# Patient Record
Sex: FEMALE | Race: WHITE | Employment: UNEMPLOYED | URBAN - METROPOLITAN AREA
[De-identification: names, ages, dates, MRNs, and addresses within clinical notes are randomized per-mention and may not be internally consistent; named-entity substitution may affect disease eponyms.]

---

## 2017-05-05 ENCOUNTER — APPOINTMENT (OUTPATIENT)
Dept: GENERAL RADIOLOGY | Age: 57
End: 2017-05-05
Payer: COMMERCIAL

## 2017-05-05 ENCOUNTER — HOSPITAL ENCOUNTER (EMERGENCY)
Age: 57
Discharge: HOME | End: 2017-05-05
Payer: COMMERCIAL

## 2017-05-05 VITALS
SYSTOLIC BLOOD PRESSURE: 134 MMHG | BODY MASS INDEX: 25.3 KG/M2 | WEIGHT: 134 LBS | TEMPERATURE: 97.9 F | HEART RATE: 75 BPM | HEIGHT: 61 IN | RESPIRATION RATE: 16 BRPM | DIASTOLIC BLOOD PRESSURE: 85 MMHG | OXYGEN SATURATION: 97 %

## 2017-05-05 DIAGNOSIS — S83.91XA SPRAIN OF RIGHT KNEE, UNSPECIFIED LIGAMENT, INITIAL ENCOUNTER: Primary | ICD-10-CM

## 2017-05-05 PROCEDURE — 73562 X-RAY EXAM OF KNEE 3: CPT

## 2017-05-05 PROCEDURE — 99283 EMERGENCY DEPT VISIT LOW MDM: CPT

## 2017-05-05 RX ORDER — NAPROXEN 500 MG/1
500 TABLET ORAL 2 TIMES DAILY
Qty: 20 TABLET | Refills: 0 | Status: SHIPPED | OUTPATIENT
Start: 2017-05-05 | End: 2018-05-17

## 2017-05-05 ASSESSMENT — ENCOUNTER SYMPTOMS
ABDOMINAL PAIN: 0
BACK PAIN: 0
COUGH: 0
SHORTNESS OF BREATH: 0

## 2017-05-05 ASSESSMENT — PAIN SCALES - GENERAL: PAINLEVEL_OUTOF10: 8

## 2017-05-05 ASSESSMENT — PAIN DESCRIPTION - ORIENTATION: ORIENTATION: RIGHT

## 2017-05-05 ASSESSMENT — PAIN DESCRIPTION - LOCATION: LOCATION: KNEE

## 2017-05-05 ASSESSMENT — PAIN DESCRIPTION - PAIN TYPE: TYPE: ACUTE PAIN

## 2017-11-21 ENCOUNTER — HOSPITAL ENCOUNTER (EMERGENCY)
Age: 57
Discharge: LEFT BEFORE DISCHARGE | End: 2017-11-21
Attending: EMERGENCY MEDICINE
Payer: COMMERCIAL

## 2017-11-21 ENCOUNTER — APPOINTMENT (OUTPATIENT)
Dept: CT IMAGING | Age: 57
End: 2017-11-21
Payer: COMMERCIAL

## 2017-11-21 VITALS
SYSTOLIC BLOOD PRESSURE: 154 MMHG | HEART RATE: 80 BPM | WEIGHT: 135 LBS | RESPIRATION RATE: 16 BRPM | TEMPERATURE: 98 F | HEIGHT: 61 IN | OXYGEN SATURATION: 99 % | BODY MASS INDEX: 25.49 KG/M2 | DIASTOLIC BLOOD PRESSURE: 97 MMHG

## 2017-11-21 DIAGNOSIS — R22.0 FACIAL SWELLING: Primary | ICD-10-CM

## 2017-11-21 LAB
ALBUMIN SERPL-MCNC: 4.5 G/DL (ref 3.9–4.9)
ALP BLD-CCNC: 72 U/L (ref 40–130)
ALT SERPL-CCNC: 17 U/L (ref 0–33)
ANION GAP SERPL CALCULATED.3IONS-SCNC: 13 MEQ/L (ref 7–13)
AST SERPL-CCNC: 19 U/L (ref 0–35)
BASOPHILS ABSOLUTE: 0 K/UL (ref 0–0.2)
BASOPHILS RELATIVE PERCENT: 0.4 %
BILIRUB SERPL-MCNC: 0.4 MG/DL (ref 0–1.2)
BUN BLDV-MCNC: 12 MG/DL (ref 6–20)
CALCIUM SERPL-MCNC: 8.9 MG/DL (ref 8.6–10.2)
CHLORIDE BLD-SCNC: 94 MEQ/L (ref 98–107)
CO2: 28 MEQ/L (ref 22–29)
CREAT SERPL-MCNC: 0.71 MG/DL (ref 0.5–0.9)
EOSINOPHILS ABSOLUTE: 0 K/UL (ref 0–0.7)
EOSINOPHILS RELATIVE PERCENT: 0.6 %
GFR AFRICAN AMERICAN: >60
GFR AFRICAN AMERICAN: >60
GFR NON-AFRICAN AMERICAN: >60
GFR NON-AFRICAN AMERICAN: >60
GLOBULIN: 3.5 G/DL (ref 2.3–3.5)
GLUCOSE BLD-MCNC: 101 MG/DL (ref 74–109)
HCT VFR BLD CALC: 41.3 % (ref 37–47)
HEMOGLOBIN: 13.9 G/DL (ref 12–16)
LYMPHOCYTES ABSOLUTE: 0.8 K/UL (ref 1–4.8)
LYMPHOCYTES RELATIVE PERCENT: 12.7 %
MCH RBC QN AUTO: 30.3 PG (ref 27–31.3)
MCHC RBC AUTO-ENTMCNC: 33.7 % (ref 33–37)
MCV RBC AUTO: 89.8 FL (ref 82–100)
MONOCYTES ABSOLUTE: 0.5 K/UL (ref 0.2–0.8)
MONOCYTES RELATIVE PERCENT: 7.8 %
NEUTROPHILS ABSOLUTE: 5.2 K/UL (ref 1.4–6.5)
NEUTROPHILS RELATIVE PERCENT: 78.5 %
PDW BLD-RTO: 15.7 % (ref 11.5–14.5)
PERFORMED ON: NORMAL
PLATELET # BLD: 83 K/UL (ref 130–400)
PLATELET SLIDE REVIEW: ABNORMAL
POC CREATININE: 0.8 MG/DL (ref 0.6–1.1)
POC SAMPLE TYPE: NORMAL
POTASSIUM SERPL-SCNC: 4.2 MEQ/L (ref 3.5–5.1)
RBC # BLD: 4.59 M/UL (ref 4.2–5.4)
SODIUM BLD-SCNC: 135 MEQ/L (ref 132–144)
TOTAL PROTEIN: 8 G/DL (ref 6.4–8.1)
WBC # BLD: 6.6 K/UL (ref 4.8–10.8)

## 2017-11-21 PROCEDURE — 85025 COMPLETE CBC W/AUTO DIFF WBC: CPT

## 2017-11-21 PROCEDURE — 36415 COLL VENOUS BLD VENIPUNCTURE: CPT

## 2017-11-21 PROCEDURE — 99283 EMERGENCY DEPT VISIT LOW MDM: CPT

## 2017-11-21 PROCEDURE — 80053 COMPREHEN METABOLIC PANEL: CPT

## 2017-11-21 RX ORDER — ONDANSETRON 2 MG/ML
4 INJECTION INTRAMUSCULAR; INTRAVENOUS ONCE
Status: DISCONTINUED | OUTPATIENT
Start: 2017-11-21 | End: 2017-11-21 | Stop reason: HOSPADM

## 2017-11-21 RX ORDER — 0.9 % SODIUM CHLORIDE 0.9 %
1000 INTRAVENOUS SOLUTION INTRAVENOUS ONCE
Status: DISCONTINUED | OUTPATIENT
Start: 2017-11-21 | End: 2017-11-21 | Stop reason: HOSPADM

## 2017-11-21 RX ORDER — KETOROLAC TROMETHAMINE 30 MG/ML
30 INJECTION, SOLUTION INTRAMUSCULAR; INTRAVENOUS ONCE
Status: DISCONTINUED | OUTPATIENT
Start: 2017-11-21 | End: 2017-11-21 | Stop reason: HOSPADM

## 2017-11-21 RX ORDER — LORAZEPAM 2 MG/ML
1 INJECTION INTRAMUSCULAR ONCE
Status: DISCONTINUED | OUTPATIENT
Start: 2017-11-21 | End: 2017-11-21 | Stop reason: HOSPADM

## 2017-11-21 RX ORDER — LEVOFLOXACIN 750 MG/1
750 TABLET ORAL DAILY
Qty: 10 TABLET | Refills: 0 | Status: SHIPPED | OUTPATIENT
Start: 2017-11-21 | End: 2017-12-01

## 2017-11-21 RX ORDER — SODIUM CHLORIDE 0.9 % (FLUSH) 0.9 %
10 SYRINGE (ML) INJECTION ONCE
Status: DISCONTINUED | OUTPATIENT
Start: 2017-11-21 | End: 2017-11-21 | Stop reason: HOSPADM

## 2017-11-21 ASSESSMENT — ENCOUNTER SYMPTOMS
VOMITING: 0
DIARRHEA: 0
BACK PAIN: 0
COUGH: 0
ABDOMINAL PAIN: 0
FACIAL SWELLING: 1
NAUSEA: 0
SHORTNESS OF BREATH: 0
SORE THROAT: 0

## 2017-11-21 ASSESSMENT — PAIN DESCRIPTION - FREQUENCY: FREQUENCY: CONTINUOUS

## 2017-11-21 ASSESSMENT — PAIN DESCRIPTION - ORIENTATION: ORIENTATION: RIGHT

## 2017-11-21 ASSESSMENT — PAIN SCALES - GENERAL: PAINLEVEL_OUTOF10: 7

## 2017-11-21 ASSESSMENT — PAIN DESCRIPTION - PAIN TYPE: TYPE: ACUTE PAIN

## 2017-11-21 ASSESSMENT — PAIN DESCRIPTION - LOCATION: LOCATION: FACE

## 2017-11-21 ASSESSMENT — PAIN DESCRIPTION - DESCRIPTORS: DESCRIPTORS: THROBBING;SHARP

## 2017-11-21 NOTE — ED PROVIDER NOTES
[facility] ED  eMERGENCY dEPARTMENT eNCOUnter      Pt Name: Sheldon Montero  MRN: [id number]  [place] 1960  Date of evaluation: 11/21/2017  Provider: Carla Fall MD    CHIEF COMPLAINT           HPI  Sheldon Montero is a 64 y.o. female per chart review has a h/o IBS, COPD, PCOS, BRANDEN, depression/anxiety, HIV with unknown CD4 count presents to the ED with facial swelling. Pt notes she woke up this morning with moderate, constant, aching R maxillary facial pain with swelling. No fever, neck pain, n/v, cp, sob. ROS  Review of Systems   Constitutional: Negative for activity change, chills and fever. HENT: Positive for facial swelling. Negative for ear pain and sore throat. Eyes: Negative for visual disturbance. Respiratory: Negative for cough and shortness of breath. Cardiovascular: Negative for chest pain, palpitations and leg swelling. Gastrointestinal: Negative for abdominal pain, diarrhea, nausea and vomiting. Genitourinary: Negative for dysuria. Musculoskeletal: Negative for back pain. Skin: Negative for rash. Neurological: Negative for dizziness and weakness. Except as noted above the remainder of the review of systems was reviewed and negative.        PAST MEDICAL HISTORY     Past Medical History:   Diagnosis Date    Anemia     Ankle fracture, right     in the past    Anxiety     Benign breast disease     right    Bipolar disorder ([address]     Carpal tunnel syndrome     Cervicalgia     and cervical disc herniation (C4-C5 level) leading to right arm pain    Chronic cough     COPD (chronic obstructive pulmonary disease) (HCC)     at small airway level (PFT's with negative methacholine challenge test:  01/2009)    Elevated BP     Fracture of left clavicle     as a child    HIV (human immunodeficiency virus infection) ([address] 09/2017    Hyperlipidemia     Irritable bowel syndrome     Menorrhagia     Migraine     Panic attacks     PCOS (polycystic ovarian education: N/A     Social History Main Topics    Smoking status: Never Smoker    Smokeless tobacco: Never Used    Alcohol use 0.0 oz/week      Comment: occasional    Drug use: No    Sexual activity: Not Currently     Other Topics Concern    None     Social History Narrative    None         PHYSICAL EXAM       ED Triage Vitals [11/21/17 1321]   BP Temp Temp Source Pulse Resp SpO2 Height Weight   (!) 154/97 98 °F (36.7 °C) Oral 80 16 99 % 5' 1\" (1.549 m) 135 lb (61.2 kg)       Physical Exam   Constitutional: She is oriented to person, place, and time. She appears well-developed. HENT:   Head: Normocephalic. Right Ear: External ear normal.   Left Ear: External ear normal.   Mouth/Throat: Oropharynx is clear and moist.   +Swelling, mild erythema noted to R maxillary area. +Tenderness to palpation. No fluctuance. Eyes: Conjunctivae are normal. Pupils are equal, round, and reactive to light. Neck: Normal range of motion. Neck supple. Cardiovascular: Normal rate, regular rhythm and normal heart sounds. Pulmonary/Chest: Effort normal and breath sounds normal.   Abdominal: Soft. Bowel sounds are normal. She exhibits no distension. There is no tenderness. Musculoskeletal: Normal range of motion. Neurological: She is alert and oriented to person, place, and time. Skin: Skin is warm and dry. Psychiatric: She has a normal mood and affect. Nursing note and vitals reviewed. MDM  65 yo female presents to the ED with R facial swelling. Pt is afebrile, hemodynamically stable. Pt given 1 L NS, IV toradol with moderate relief. Labs unremarkable. Plan was to do CT face to r/o abscess and eval infx however pt decided she did not want to wait in the ED anymore. I explained to her the risks/benefits of a CT scan and staying in the ED however pt still wanted to go home. Pt given prescription for levaquin and advised to return if swelling/pain got worse. Pt understands plan.            FINAL

## 2018-01-11 DIAGNOSIS — Z12.4 CERVICAL CANCER SCREENING: ICD-10-CM

## 2018-01-11 DIAGNOSIS — R85.81 ANAL HIGH RISK HUMAN PAPILLOMAVIRUS (HPV) DNA TEST POSITIVE: Primary | ICD-10-CM

## 2018-01-11 DIAGNOSIS — K58.0 IRRITABLE BOWEL SYNDROME WITH DIARRHEA: ICD-10-CM

## 2018-01-11 DIAGNOSIS — B20 HIV (HUMAN IMMUNODEFICIENCY VIRUS INFECTION) (HCC): ICD-10-CM

## 2018-01-11 DIAGNOSIS — R87.810 CERVICAL HIGH RISK HPV (HUMAN PAPILLOMAVIRUS) TEST POSITIVE: ICD-10-CM

## 2018-01-30 ENCOUNTER — OFFICE VISIT (OUTPATIENT)
Dept: OBGYN CLINIC | Age: 58
End: 2018-01-30
Payer: COMMERCIAL

## 2018-01-30 VITALS
HEART RATE: 74 BPM | SYSTOLIC BLOOD PRESSURE: 119 MMHG | DIASTOLIC BLOOD PRESSURE: 79 MMHG | WEIGHT: 139 LBS | BODY MASS INDEX: 26.26 KG/M2

## 2018-01-30 DIAGNOSIS — Z00.00 ENCOUNTER FOR ANNUAL PHYSICAL EXAM: Primary | ICD-10-CM

## 2018-01-30 PROCEDURE — G8427 DOCREV CUR MEDS BY ELIG CLIN: HCPCS | Performed by: OBSTETRICS & GYNECOLOGY

## 2018-01-30 PROCEDURE — 99213 OFFICE O/P EST LOW 20 MIN: CPT | Performed by: OBSTETRICS & GYNECOLOGY

## 2018-01-30 PROCEDURE — 1036F TOBACCO NON-USER: CPT | Performed by: OBSTETRICS & GYNECOLOGY

## 2018-01-30 PROCEDURE — 3014F SCREEN MAMMO DOC REV: CPT | Performed by: OBSTETRICS & GYNECOLOGY

## 2018-01-30 PROCEDURE — G8419 CALC BMI OUT NRM PARAM NOF/U: HCPCS | Performed by: OBSTETRICS & GYNECOLOGY

## 2018-01-30 PROCEDURE — G8482 FLU IMMUNIZE ORDER/ADMIN: HCPCS | Performed by: OBSTETRICS & GYNECOLOGY

## 2018-01-30 PROCEDURE — 3017F COLORECTAL CA SCREEN DOC REV: CPT | Performed by: OBSTETRICS & GYNECOLOGY

## 2018-02-01 PROBLEM — Z00.00 ENCOUNTER FOR ANNUAL PHYSICAL EXAM: Status: ACTIVE | Noted: 2018-02-01

## 2018-02-01 RX ORDER — NEOMYCIN SULFATE, POLYMYXIN B SULFATE, AND DEXAMETHASONE 3.5; 10000; 1 MG/G; [USP'U]/G; MG/G
OINTMENT OPHTHALMIC
COMMUNITY
Start: 2018-01-09 | End: 2018-06-15 | Stop reason: ALTCHOICE

## 2018-02-01 RX ORDER — OMEPRAZOLE 20 MG/1
20 CAPSULE, DELAYED RELEASE ORAL
COMMUNITY
Start: 2017-10-09 | End: 2018-02-08 | Stop reason: ALTCHOICE

## 2018-02-01 RX ORDER — ROSUVASTATIN CALCIUM 5 MG/1
5 TABLET, COATED ORAL EVERY OTHER DAY
COMMUNITY
Start: 2017-12-19

## 2018-02-08 ENCOUNTER — OFFICE VISIT (OUTPATIENT)
Dept: GASTROENTEROLOGY | Age: 58
End: 2018-02-08
Payer: COMMERCIAL

## 2018-02-08 VITALS
OXYGEN SATURATION: 99 % | RESPIRATION RATE: 18 BRPM | HEART RATE: 76 BPM | DIASTOLIC BLOOD PRESSURE: 78 MMHG | HEIGHT: 61 IN | SYSTOLIC BLOOD PRESSURE: 120 MMHG | WEIGHT: 140 LBS | BODY MASS INDEX: 26.43 KG/M2

## 2018-02-08 DIAGNOSIS — B20 HIV (HUMAN IMMUNODEFICIENCY VIRUS INFECTION) (HCC): Primary | ICD-10-CM

## 2018-02-08 DIAGNOSIS — R19.7 DIARRHEA, UNSPECIFIED TYPE: ICD-10-CM

## 2018-02-08 PROCEDURE — 1036F TOBACCO NON-USER: CPT | Performed by: INTERNAL MEDICINE

## 2018-02-08 PROCEDURE — G8428 CUR MEDS NOT DOCUMENT: HCPCS | Performed by: INTERNAL MEDICINE

## 2018-02-08 PROCEDURE — G8482 FLU IMMUNIZE ORDER/ADMIN: HCPCS | Performed by: INTERNAL MEDICINE

## 2018-02-08 PROCEDURE — 99203 OFFICE O/P NEW LOW 30 MIN: CPT | Performed by: INTERNAL MEDICINE

## 2018-02-08 PROCEDURE — G8419 CALC BMI OUT NRM PARAM NOF/U: HCPCS | Performed by: INTERNAL MEDICINE

## 2018-02-08 PROCEDURE — 3017F COLORECTAL CA SCREEN DOC REV: CPT | Performed by: INTERNAL MEDICINE

## 2018-02-08 PROCEDURE — 3014F SCREEN MAMMO DOC REV: CPT | Performed by: INTERNAL MEDICINE

## 2018-02-08 RX ORDER — POLYETHYLENE GLYCOL 3350, SODIUM CHLORIDE, SODIUM BICARBONATE, POTASSIUM CHLORIDE 420; 11.2; 5.72; 1.48 G/4L; G/4L; G/4L; G/4L
4000 POWDER, FOR SOLUTION ORAL ONCE
Qty: 1 BOTTLE | Refills: 0 | Status: SHIPPED | OUTPATIENT
Start: 2018-02-08 | End: 2018-02-08

## 2018-02-08 NOTE — PROGRESS NOTES
Gastroenterology Clinic Visit    Randa Jasso  [id number]  Chief Complaint   Patient presents with    Diarrhea     HPI: 62 y.o. female presents to the clinic with had concerns including Diarrhea. Patient presents to the GI clinic with further evaluation for positive HPV. Patient with recent diagnosis of HIV 8 months ago at [facility]. Patient unclear as to her visit to the clinic today. She was under the impression she was seeing infectious disease for further evaluation of the HPV. From the GI standpoint of view she does report to chronic diarrhea which appears to have worsened after she was started on medication for the HIV. Detailed note from [place] clinic visits reviewed and it appears that the source of her infection has not been identified despite careful combing off the potential sources. Patient reports intermittent blood on toilet wipes consistent with her reported history of internal and external hemorrhoids. Patient denies any recent weight loss, in fact reports she has gained a pound or 2 in the last 2 weeks  Psych history as noted below    Review of Systems   All other systems reviewed and are negative.      Past Medical History:   Diagnosis Date    Anemia     Ankle fracture, right     in the past    Anxiety     Benign breast disease     right    Bipolar disorder (HCC)     Carpal tunnel syndrome     Cervicalgia     and cervical disc herniation (C4-C5 level) leading to right arm pain    Chronic cough     COPD (chronic obstructive pulmonary disease) (HCC)     at small airway level (PFT's with negative methacholine challenge test:  01/2009)    Elevated BP     Fracture of left clavicle     as a child    HIV (human immunodeficiency virus infection) ([facility] 09/2017    Hyperlipidemia     Irritable bowel syndrome     Menorrhagia     Migraine     Panic attacks     PCOS (polycystic ovarian syndrome)     Right shoulder pain       Past Surgical History:   Procedure Laterality Date

## 2018-02-12 ENCOUNTER — TELEPHONE (OUTPATIENT)
Dept: GASTROENTEROLOGY | Age: 58
End: 2018-02-12

## 2018-02-12 LAB — PAP SMEAR: ABNORMAL

## 2018-02-26 DIAGNOSIS — B20 HIV (HUMAN IMMUNODEFICIENCY VIRUS INFECTION) (HCC): ICD-10-CM

## 2018-02-26 DIAGNOSIS — Z13.220 LIPID SCREENING: ICD-10-CM

## 2018-02-26 DIAGNOSIS — E55.9 VITAMIN D DEFICIENCY: Primary | ICD-10-CM

## 2018-02-26 PROBLEM — Z21 HIV (HUMAN IMMUNODEFICIENCY VIRUS INFECTION) (HCC): Status: ACTIVE | Noted: 2018-02-26

## 2018-02-28 ENCOUNTER — NURSE ONLY (OUTPATIENT)
Dept: INFECTIOUS DISEASES | Age: 58
End: 2018-02-28

## 2018-02-28 DIAGNOSIS — B20 HIV (HUMAN IMMUNODEFICIENCY VIRUS INFECTION) (HCC): ICD-10-CM

## 2018-02-28 DIAGNOSIS — Z01.89 ROUTINE LAB DRAW: Primary | ICD-10-CM

## 2018-02-28 DIAGNOSIS — E55.9 VITAMIN D DEFICIENCY: ICD-10-CM

## 2018-02-28 LAB
ALBUMIN SERPL-MCNC: 4.5 G/DL (ref 3.9–4.9)
ALP BLD-CCNC: 71 U/L (ref 40–130)
ALT SERPL-CCNC: 14 U/L (ref 0–33)
ANION GAP SERPL CALCULATED.3IONS-SCNC: 20 MEQ/L (ref 7–13)
AST SERPL-CCNC: 18 U/L (ref 0–35)
BILIRUB SERPL-MCNC: 0.3 MG/DL (ref 0–1.2)
BUN BLDV-MCNC: 18 MG/DL (ref 6–20)
CALCIUM SERPL-MCNC: 9 MG/DL (ref 8.6–10.2)
CHLORIDE BLD-SCNC: 99 MEQ/L (ref 98–107)
CO2: 25 MEQ/L (ref 22–29)
CREAT SERPL-MCNC: 0.89 MG/DL (ref 0.5–0.9)
GFR AFRICAN AMERICAN: >60
GFR NON-AFRICAN AMERICAN: >60
GLOBULIN: 2.7 G/DL (ref 2.3–3.5)
GLUCOSE BLD-MCNC: 59 MG/DL (ref 74–109)
HCT VFR BLD CALC: 42.5 % (ref 37–47)
HEMOGLOBIN: 13.7 G/DL (ref 12–16)
HEPATITIS C ANTIBODY INTERPRETATION: NORMAL
MCH RBC QN AUTO: 31.8 PG (ref 27–31.3)
MCHC RBC AUTO-ENTMCNC: 32.3 % (ref 33–37)
MCV RBC AUTO: 98.4 FL (ref 82–100)
PDW BLD-RTO: 15 % (ref 11.5–14.5)
PLATELET # BLD: 124 K/UL (ref 130–400)
POTASSIUM SERPL-SCNC: 3.9 MEQ/L (ref 3.5–5.1)
RBC # BLD: 4.32 M/UL (ref 4.2–5.4)
SODIUM BLD-SCNC: 144 MEQ/L (ref 132–144)
TOTAL PROTEIN: 7.2 G/DL (ref 6.4–8.1)
VITAMIN D 25-HYDROXY: 20.2 NG/ML (ref 30–100)
WBC # BLD: 4 K/UL (ref 4.8–10.8)

## 2018-03-01 LAB — RPR: NORMAL

## 2018-03-02 LAB
ABSOLUTE CD 4 HELPER: 549 CELLS/UL (ref 430–1800)
CD4 % HELPER T CELL: 51 % (ref 32–64)
LYMPHOCYTE SUBSET PANEL 2 INFO: NORMAL

## 2018-03-03 LAB
HIV RNA PCR INTERPRETATION: DETECTED
HIV-1 RNA BY PCR, QN: ABNORMAL CPY/ML
HIV-1 RNA BY PCR, QN: ABNORMAL LOG

## 2018-03-04 LAB
QUANTIFERON (R) TB GOLD (INCUBATED): NEGATIVE
QUANTIFERON MITOGEN: >10 IU/ML
QUANTIFERON NIL: 0.17 IU/ML
QUANTIFERON TB AG MINUS NIL: 0 IU/ML (ref 0–0.34)
RPR TITER: NORMAL

## 2018-03-07 LAB — C. TRACHOMATIS DNA ,URINE: NEGATIVE

## 2018-03-09 ENCOUNTER — OFFICE VISIT (OUTPATIENT)
Dept: INFECTIOUS DISEASES | Age: 58
End: 2018-03-09
Payer: COMMERCIAL

## 2018-03-09 VITALS
RESPIRATION RATE: 14 BRPM | DIASTOLIC BLOOD PRESSURE: 79 MMHG | TEMPERATURE: 97.7 F | HEIGHT: 61 IN | WEIGHT: 139.8 LBS | HEART RATE: 65 BPM | SYSTOLIC BLOOD PRESSURE: 129 MMHG | BODY MASS INDEX: 26.39 KG/M2

## 2018-03-09 DIAGNOSIS — R19.7 DIARRHEA, UNSPECIFIED TYPE: ICD-10-CM

## 2018-03-09 DIAGNOSIS — A59.9 TRICHOMONIASIS: ICD-10-CM

## 2018-03-09 DIAGNOSIS — B20 HIV (HUMAN IMMUNODEFICIENCY VIRUS INFECTION) (HCC): Primary | ICD-10-CM

## 2018-03-09 DIAGNOSIS — E55.9 VITAMIN D DEFICIENCY: ICD-10-CM

## 2018-03-09 DIAGNOSIS — N76.0 BACTERIAL VAGINOSIS: ICD-10-CM

## 2018-03-09 DIAGNOSIS — B96.89 BACTERIAL VAGINOSIS: ICD-10-CM

## 2018-03-09 DIAGNOSIS — Z86.19 HISTORY OF HPV INFECTION: ICD-10-CM

## 2018-03-09 PROCEDURE — G8427 DOCREV CUR MEDS BY ELIG CLIN: HCPCS | Performed by: INTERNAL MEDICINE

## 2018-03-09 PROCEDURE — G8482 FLU IMMUNIZE ORDER/ADMIN: HCPCS | Performed by: INTERNAL MEDICINE

## 2018-03-09 PROCEDURE — 99205 OFFICE O/P NEW HI 60 MIN: CPT | Performed by: INTERNAL MEDICINE

## 2018-03-09 PROCEDURE — G8419 CALC BMI OUT NRM PARAM NOF/U: HCPCS | Performed by: INTERNAL MEDICINE

## 2018-03-09 PROCEDURE — 3017F COLORECTAL CA SCREEN DOC REV: CPT | Performed by: INTERNAL MEDICINE

## 2018-03-09 PROCEDURE — 3014F SCREEN MAMMO DOC REV: CPT | Performed by: INTERNAL MEDICINE

## 2018-03-09 PROCEDURE — 1036F TOBACCO NON-USER: CPT | Performed by: INTERNAL MEDICINE

## 2018-03-09 NOTE — PROGRESS NOTES
Initial HIV Consult      Patient Name: Criss Stubbs  YOB: 1960  Patient Sex: female  Medical Record Number: [id number]        Background:  Diagnosed about 6 months ago. Symptoms started with UTI. WBCs have been low for years - possibility older infection  Lives with  - hiv negative. Has current other sexual partners - per patient all negative. No THC and no soking. Periodic alcohol  No travel out of [place]. Cat at home. No TB history    Brought a pad with brown discharge and states there is a smell. Has had trichomonas in the past. Possible BV. +anal warts history  Very anxious    Some loose stools    Review of Systems:  All 14 systems reviewed with patient and are negative other than as stated above. Medication History  Current Outpatient Prescriptions   Medication Sig Dispense Refill    Methylcobalamin (B12-ACTIVE PO) Take by mouth      rosuvastatin (CRESTOR) 5 MG tablet       neomycin-polymyxin-dexameth 3.5-04874-3.1 OINT       Jtlqtgs-Tnvwa-Tqlngsoc-TenofAF (GENVOYA) [phone] MG TABS Take 1 tablet by mouth daily      acetaminophen (TYLENOL) 500 MG tablet Take 500 mg by mouth      Naproxen Sodium (ALEVE) 220 MG CAPS Take  by mouth.  clonazepam (KLONOPIN) 0.5 MG tablet Take 0.5 mg by mouth 2 times daily as needed.  Multiple Vitamin (MULTIVITAMIN PO) Take 1 tablet by mouth daily.  naproxen (NAPROSYN) 500 MG tablet Take 1 tablet by mouth 2 times daily for 20 doses 20 tablet 0    cyanocobalamin 1000 MCG tablet Take 1,000 mcg by mouth       No current facility-administered medications for this visit.         Past Medical History  Past Medical History:   Diagnosis Date    Anemia     Ankle fracture, right     in the past    Anxiety     Benign breast disease     right    Bipolar disorder (HCC)     Carpal tunnel syndrome     Cervicalgia     and cervical disc herniation (C4-C5 level) leading to right arm pain    Chronic cough     COPD (chronic clear   Abdomen: soft, ND, NTTP, +BS  Extrem:+pulses, no calf pain, no asymmetry of the upper or lower extremities  Neuro exam: CN II-XII intact, facial expressions symmertrical bilaterally, no slurred speech, muscle strength equal bilaterally        Current labs        Chemistry        Component Value Date/Time     02/28/2018 1403    K 3.9 02/28/2018 1403    CL 99 02/28/2018 1403    CO2 25 02/28/2018 1403    BUN 18 02/28/2018 1403    CREATININE 0.89 02/28/2018 1403        Component Value Date/Time    CALCIUM 9.0 02/28/2018 1403    ALKPHOS 71 02/28/2018 1403    AST 18 02/28/2018 1403    ALT 14 02/28/2018 1403    BILITOT 0.3 02/28/2018 1403          No components found for: CHLPL  Lab Results   Component Value Date    TRIG 100 01/09/2013    TRIG 159 (H) 09/11/2012    TRIG 228 (H) 06/11/2012     Lab Results   Component Value Date    HDL 47 01/09/2013    HDL 47 09/11/2012    HDL 43 06/11/2012     Lab Results   Component Value Date    LDLCALC 105 01/09/2013    [address] 09/11/2012    LDLCALC 103 06/11/2012     No results found for: LABVLDL  No results found for: HAV, HEPAIGM, HEPBIGM, HEPBCAB, HBEAG, HEPCAB  No results found for: RPR    Impression/Plan  HIV infection  Bacterial vaginosis/ trichomonas - flagyl course. Anxiety  Diarrhea  Hx HPV  Vitamin D def - will start course    Labs prior to next visit  Needs to see gyn d/t hx of anal warts for pap - HPV hx    Prevention  Substance abuse screening performed:   Yes  Counseling provided during this visit > 50% yes   Brief Mental Health Screening performed:  Yes  Physical abuse screening performed: yes    Patient has been informed of the importance of protection during sexual encounters which include, but are not limited to vaginal intercourse, anal intercourse, and oral sex. Patient has also been made aware that it is a felony in the [address] to engage in a sexual encounter without first disclosing HIV status to partner.     Follow up: 3-6 months    Time

## 2018-03-12 ENCOUNTER — TELEPHONE (OUTPATIENT)
Dept: OBGYN CLINIC | Age: 58
End: 2018-03-12

## 2018-03-12 NOTE — TELEPHONE ENCOUNTER
Pt is scheduled for a colpo and is extremely concerned about the pain, she wants to know if you will give her anything for the pain?

## 2018-03-13 DIAGNOSIS — A63.0 ANAL MUCOSAL WART DUE TO HUMAN PAPILLOMAVIRUS (HPV): Primary | ICD-10-CM

## 2018-03-13 NOTE — TELEPHONE ENCOUNTER
COLPOSCOPY needs no pain meds. She can take OTC ibuprofen or acetaminophen prior to coming in if necessary.  YADIRA

## 2018-03-15 DIAGNOSIS — B20 HIV (HUMAN IMMUNODEFICIENCY VIRUS INFECTION) (HCC): ICD-10-CM

## 2018-03-15 DIAGNOSIS — R19.7 DIARRHEA, UNSPECIFIED TYPE: ICD-10-CM

## 2018-03-15 LAB — GI BACTERIAL PATHOGENS BY PCR: NORMAL

## 2018-03-17 LAB
OVA AND PARASITE TRICHROME: NEGATIVE
OVA AND PARASITE WET MOUNT: NEGATIVE

## 2018-03-20 ENCOUNTER — PROCEDURE VISIT (OUTPATIENT)
Dept: OBGYN CLINIC | Age: 58
End: 2018-03-20
Payer: COMMERCIAL

## 2018-03-20 VITALS
SYSTOLIC BLOOD PRESSURE: 112 MMHG | HEART RATE: 72 BPM | WEIGHT: 141 LBS | HEIGHT: 61 IN | DIASTOLIC BLOOD PRESSURE: 76 MMHG | BODY MASS INDEX: 26.62 KG/M2

## 2018-03-20 DIAGNOSIS — R87.610 ASCUS WITH POSITIVE HIGH RISK HPV CERVICAL: Primary | ICD-10-CM

## 2018-03-20 DIAGNOSIS — R87.810 ASCUS WITH POSITIVE HIGH RISK HPV CERVICAL: Primary | ICD-10-CM

## 2018-03-20 LAB
CONTROL: YES
PREGNANCY TEST URINE, POC: NORMAL

## 2018-03-20 PROCEDURE — 99999 PR OFFICE/OUTPT VISIT,PROCEDURE ONLY: CPT | Performed by: OBSTETRICS & GYNECOLOGY

## 2018-03-20 PROCEDURE — 57454 BX/CURETT OF CERVIX W/SCOPE: CPT | Performed by: OBSTETRICS & GYNECOLOGY

## 2018-03-20 NOTE — PROGRESS NOTES
Colposcopy Procedure Note    Indications: Pap smear : ASCUS with POSITIVE high risk HPV. Procedure Details   The risks and benefits of the procedure and Verbal informed consent obtained. Speculum placed in vagina and excellent visualization of cervix achieved, cervix swabbed x 3 with acetic acid solution. Findings:  Cervix: 4 biopsies 3, 12, 9, 6 o'clock with ECC     Specimens: as above    Complications: none. Plan:  Specimens labelled and sent to Pathology. Follow up:  Return in about 2 weeks (around 4/3/2018) for F/U for results.       Ulysses Carina, MD

## 2018-03-21 PROBLEM — A63.0 ANAL WARTS: Status: ACTIVE | Noted: 2018-03-19

## 2018-04-09 ENCOUNTER — OFFICE VISIT (OUTPATIENT)
Dept: OBGYN CLINIC | Age: 58
End: 2018-04-09
Payer: COMMERCIAL

## 2018-04-09 VITALS — DIASTOLIC BLOOD PRESSURE: 84 MMHG | HEART RATE: 76 BPM | HEIGHT: 61 IN | SYSTOLIC BLOOD PRESSURE: 118 MMHG

## 2018-04-09 DIAGNOSIS — R87.810 ASCUS WITH POSITIVE HIGH RISK HPV CERVICAL: ICD-10-CM

## 2018-04-09 DIAGNOSIS — N87.0 DYSPLASIA OF CERVIX, LOW GRADE (CIN 1): Primary | ICD-10-CM

## 2018-04-09 DIAGNOSIS — R87.610 ASCUS WITH POSITIVE HIGH RISK HPV CERVICAL: ICD-10-CM

## 2018-04-09 PROCEDURE — 99213 OFFICE O/P EST LOW 20 MIN: CPT | Performed by: OBSTETRICS & GYNECOLOGY

## 2018-04-09 PROCEDURE — G8419 CALC BMI OUT NRM PARAM NOF/U: HCPCS | Performed by: OBSTETRICS & GYNECOLOGY

## 2018-04-09 PROCEDURE — G8427 DOCREV CUR MEDS BY ELIG CLIN: HCPCS | Performed by: OBSTETRICS & GYNECOLOGY

## 2018-04-09 PROCEDURE — 1036F TOBACCO NON-USER: CPT | Performed by: OBSTETRICS & GYNECOLOGY

## 2018-04-09 PROCEDURE — 3017F COLORECTAL CA SCREEN DOC REV: CPT | Performed by: OBSTETRICS & GYNECOLOGY

## 2018-04-09 PROCEDURE — 3014F SCREEN MAMMO DOC REV: CPT | Performed by: OBSTETRICS & GYNECOLOGY

## 2018-04-09 ASSESSMENT — ENCOUNTER SYMPTOMS
SHORTNESS OF BREATH: 0
COUGH: 0
VOMITING: 0
NAUSEA: 0

## 2018-04-11 PROBLEM — Z00.00 ENCOUNTER FOR ANNUAL PHYSICAL EXAM: Status: RESOLVED | Noted: 2018-02-01 | Resolved: 2018-04-11

## 2018-05-17 ENCOUNTER — APPOINTMENT (OUTPATIENT)
Dept: GENERAL RADIOLOGY | Age: 58
End: 2018-05-17
Payer: COMMERCIAL

## 2018-05-17 ENCOUNTER — HOSPITAL ENCOUNTER (EMERGENCY)
Age: 58
Discharge: HOME | End: 2018-05-17
Payer: COMMERCIAL

## 2018-05-17 VITALS
HEART RATE: 87 BPM | DIASTOLIC BLOOD PRESSURE: 88 MMHG | WEIGHT: 141 LBS | TEMPERATURE: 98.3 F | BODY MASS INDEX: 26.62 KG/M2 | OXYGEN SATURATION: 98 % | SYSTOLIC BLOOD PRESSURE: 158 MMHG | HEIGHT: 61 IN | RESPIRATION RATE: 18 BRPM

## 2018-05-17 DIAGNOSIS — S20.212A CONTUSION OF LEFT CHEST WALL, INITIAL ENCOUNTER: Primary | ICD-10-CM
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
## 2018-05-17 LAB
EKG ATRIAL RATE: 90 BPM
EKG P AXIS: 62 DEGREES
EKG P-R INTERVAL: 150 MS
EKG Q-T INTERVAL: 392 MS
EKG QRS DURATION: 84 MS
EKG QTC CALCULATION (BAZETT): 479 MS
EKG R AXIS: 21 DEGREES
EKG T AXIS: -9 DEGREES
EKG VENTRICULAR RATE: 90 BPM

## 2018-05-17 PROCEDURE — 6370000000 HC RX 637 (ALT 250 FOR IP): Performed by: PHYSICIAN ASSISTANT

## 2018-05-17 PROCEDURE — 93005 ELECTROCARDIOGRAM TRACING: CPT

## 2018-05-17 PROCEDURE — 72050 X-RAY EXAM NECK SPINE 4/5VWS: CPT

## 2018-05-17 PROCEDURE — 99285 EMERGENCY DEPT VISIT HI MDM: CPT

## 2018-05-17 PROCEDURE — 71046 X-RAY EXAM CHEST 2 VIEWS: CPT

## 2018-05-17 RX ORDER — NAPROXEN 500 MG/1
500 TABLET ORAL 2 TIMES DAILY
Qty: 20 TABLET | Refills: 0 | Status: SHIPPED | OUTPATIENT
Start: 2018-05-17 | End: 2018-12-14 | Stop reason: ALTCHOICE

## 2018-05-17 RX ORDER — IBUPROFEN 800 MG/1
800 TABLET ORAL ONCE
Status: COMPLETED | OUTPATIENT
Start: 2018-05-17 | End: 2018-05-17

## 2018-05-17 RX ORDER — CYCLOBENZAPRINE HCL 10 MG
10 TABLET ORAL 3 TIMES DAILY PRN
Qty: 21 TABLET | Refills: 0 | Status: SHIPPED | OUTPATIENT
Start: 2018-05-17 | End: 2018-06-15

## 2018-05-17 RX ADMIN — IBUPROFEN 800 MG: 800 TABLET ORAL at 15:24

## 2018-05-17 ASSESSMENT — PAIN DESCRIPTION - LOCATION: LOCATION: CHEST

## 2018-05-17 ASSESSMENT — ENCOUNTER SYMPTOMS
ABDOMINAL PAIN: 0
SORE THROAT: 0
BACK PAIN: 0
EYE DISCHARGE: 0
RECTAL PAIN: 0
RHINORRHEA: 0
VOMITING: 0
ABDOMINAL DISTENTION: 0
DIARRHEA: 0
NAUSEA: 0
COLOR CHANGE: 0
SHORTNESS OF BREATH: 0
CONSTIPATION: 0

## 2018-05-17 ASSESSMENT — PAIN SCALES - GENERAL
PAINLEVEL_OUTOF10: 10
PAINLEVEL_OUTOF10: 10

## 2018-05-18 PROCEDURE — 93010 ELECTROCARDIOGRAM REPORT: CPT | Performed by: INTERNAL MEDICINE

## 2018-05-30 ENCOUNTER — NURSE ONLY (OUTPATIENT)
Dept: INFECTIOUS DISEASES | Age: 58
End: 2018-05-30

## 2018-05-30 DIAGNOSIS — Z01.89 ROUTINE LAB DRAW: Primary | ICD-10-CM

## 2018-05-30 DIAGNOSIS — B20 HIV (HUMAN IMMUNODEFICIENCY VIRUS INFECTION) (HCC): ICD-10-CM

## 2018-05-30 LAB
ALBUMIN SERPL-MCNC: 4.6 G/DL (ref 3.9–4.9)
ALP BLD-CCNC: 88 U/L (ref 40–130)
ALT SERPL-CCNC: 15 U/L (ref 0–33)
ANION GAP SERPL CALCULATED.3IONS-SCNC: 17 MEQ/L (ref 7–13)
AST SERPL-CCNC: 17 U/L (ref 0–35)
BILIRUB SERPL-MCNC: 0.3 MG/DL (ref 0–1.2)
BUN BLDV-MCNC: 17 MG/DL (ref 6–20)
CALCIUM SERPL-MCNC: 8.7 MG/DL (ref 8.6–10.2)
CHLORIDE BLD-SCNC: 100 MEQ/L (ref 98–107)
CO2: 23 MEQ/L (ref 22–29)
CREAT SERPL-MCNC: 0.75 MG/DL (ref 0.5–0.9)
GFR AFRICAN AMERICAN: >60
GFR NON-AFRICAN AMERICAN: >60
GLOBULIN: 2.3 G/DL (ref 2.3–3.5)
GLUCOSE BLD-MCNC: 111 MG/DL (ref 74–109)
HCT VFR BLD CALC: 43.8 % (ref 37–47)
HEMOGLOBIN: 14.5 G/DL (ref 12–16)
MCH RBC QN AUTO: 32 PG (ref 27–31.3)
MCHC RBC AUTO-ENTMCNC: 33.2 % (ref 33–37)
MCV RBC AUTO: 96.3 FL (ref 82–100)
PDW BLD-RTO: 14.2 % (ref 11.5–14.5)
PLATELET # BLD: 154 K/UL (ref 130–400)
POTASSIUM SERPL-SCNC: 4.1 MEQ/L (ref 3.5–5.1)
RBC # BLD: 4.55 M/UL (ref 4.2–5.4)
SODIUM BLD-SCNC: 140 MEQ/L (ref 132–144)
TOTAL PROTEIN: 6.9 G/DL (ref 6.4–8.1)
WBC # BLD: 4.3 K/UL (ref 4.8–10.8)

## 2018-06-01 LAB
ABSOLUTE CD 4 HELPER: 595 CELLS/UL (ref 430–1800)
CD4 % HELPER T CELL: 50 % (ref 32–64)
LYMPHOCYTE SUBSET PANEL 2 INFO: NORMAL

## 2018-06-02 LAB
HIV RNA PCR INTERPRETATION: NOT DETECTED
HIV-1 RNA BY PCR, QN: <1.3 LOG
HIV-1 RNA BY PCR, QN: <20 CPY/ML

## 2018-06-14 DIAGNOSIS — B20 HIV (HUMAN IMMUNODEFICIENCY VIRUS INFECTION) (HCC): Primary | ICD-10-CM

## 2018-06-15 ENCOUNTER — OFFICE VISIT (OUTPATIENT)
Dept: INFECTIOUS DISEASES | Age: 58
End: 2018-06-15
Payer: COMMERCIAL

## 2018-06-15 VITALS
SYSTOLIC BLOOD PRESSURE: 132 MMHG | HEIGHT: 61 IN | BODY MASS INDEX: 26.62 KG/M2 | RESPIRATION RATE: 18 BRPM | DIASTOLIC BLOOD PRESSURE: 83 MMHG | HEART RATE: 97 BPM | WEIGHT: 141 LBS | TEMPERATURE: 97.9 F

## 2018-06-15 DIAGNOSIS — Z20.2 EXPOSURE TO STD: ICD-10-CM

## 2018-06-15 DIAGNOSIS — B20 HIV (HUMAN IMMUNODEFICIENCY VIRUS INFECTION) (HCC): ICD-10-CM

## 2018-06-15 DIAGNOSIS — B20 HIV (HUMAN IMMUNODEFICIENCY VIRUS INFECTION) (HCC): Primary | ICD-10-CM

## 2018-06-15 PROCEDURE — G8428 CUR MEDS NOT DOCUMENT: HCPCS | Performed by: INTERNAL MEDICINE

## 2018-06-15 PROCEDURE — G8419 CALC BMI OUT NRM PARAM NOF/U: HCPCS | Performed by: INTERNAL MEDICINE

## 2018-06-15 PROCEDURE — 99214 OFFICE O/P EST MOD 30 MIN: CPT | Performed by: INTERNAL MEDICINE

## 2018-06-15 PROCEDURE — 3017F COLORECTAL CA SCREEN DOC REV: CPT | Performed by: INTERNAL MEDICINE

## 2018-06-15 PROCEDURE — 1036F TOBACCO NON-USER: CPT | Performed by: INTERNAL MEDICINE

## 2018-06-18 LAB — GC CULTURE & SMEAR: NORMAL

## 2018-06-19 LAB
C TRACH SPEC QL CULT: NEGATIVE
CHLAMYDIA CULTURE SOURCE: NORMAL

## 2018-06-27 ENCOUNTER — NURSE ONLY (OUTPATIENT)
Dept: INFECTIOUS DISEASES | Age: 58
End: 2018-06-27

## 2018-06-27 DIAGNOSIS — Z20.2 EXPOSURE TO STD: ICD-10-CM

## 2018-06-27 DIAGNOSIS — B20 HUMAN IMMUNODEFICIENCY VIRUS (HCC): Primary | ICD-10-CM

## 2018-06-27 DIAGNOSIS — B20 HIV (HUMAN IMMUNODEFICIENCY VIRUS INFECTION) (HCC): ICD-10-CM

## 2018-06-28 LAB — RPR: NORMAL

## 2018-06-29 LAB
C. TRACHOMATIS DNA ,URINE: NEGATIVE
N. GONORRHOEAE DNA, URINE: NEGATIVE

## 2018-07-03 ENCOUNTER — TELEPHONE (OUTPATIENT)
Dept: INFECTIOUS DISEASES | Age: 58
End: 2018-07-03

## 2018-07-10 ENCOUNTER — NURSE ONLY (OUTPATIENT)
Dept: INFECTIOUS DISEASES | Age: 58
End: 2018-07-10

## 2018-07-10 NOTE — PROGRESS NOTES
Patient is a walk in today to discuss medication and family issues. Spoke at length about family problems and stressors. She does see phcy at [facility]. Follows regularly. Sunday July 8th her son was admitted to [facility]. She is extremely tearful about her son. \"things are better with my  though\". 100% with her medications. She admits to not knowing where \"she caught hiv\" she questioned stopping her meds and being re tested. RN extremely disagreed with this idea and re educated her on HIV and transmission process. She then stated she will not stop them. She still smokes and will not quit yet. Options to help were offered. She will come back in to discuss issues 7/24/18 with RN re assured her to call office if needed. Spent 1 hour with patient.

## 2018-07-23 ENCOUNTER — NURSE ONLY (OUTPATIENT)
Dept: INFECTIOUS DISEASES | Age: 58
End: 2018-07-23

## 2018-07-31 ENCOUNTER — TELEPHONE (OUTPATIENT)
Dept: INFECTIOUS DISEASES | Age: 58
End: 2018-07-31

## 2018-07-31 NOTE — TELEPHONE ENCOUNTER
Pt called in upset talking about test results/ follow up with Dr. Lesley Blue. Pt states she is worried because his office called to tell her she needs a colonoscopy due to abnormal results. She is \"panicing and crying\" . She has been extremely stressed out due to health concerns with her son Thiago Lee now this\". Reassured pt to take one thing at a time. She has apt. To discuss results and plan. She also scheduled f/u with PCP. Pt is worried about being off work so long. Instructed pt to reach out to PCP for excuse or other needed paper work. She will call with updates. By end of conversation pt \"i feel better now, thank you\" requested she call with other concerns also.

## 2018-08-09 ENCOUNTER — TELEPHONE (OUTPATIENT)
Dept: INFECTIOUS DISEASES | Age: 58
End: 2018-08-09

## 2018-08-16 ENCOUNTER — TELEPHONE (OUTPATIENT)
Dept: INFECTIOUS DISEASES | Age: 58
End: 2018-08-16

## 2018-09-14 ENCOUNTER — TELEPHONE (OUTPATIENT)
Dept: INFECTIOUS DISEASES | Age: 58
End: 2018-09-14

## 2018-09-14 NOTE — TELEPHONE ENCOUNTER
Pt called in upset needing to talk with RN. She stated she is having problems with her landlord. Stating there is a bug problem and landlord is not getting things cleaned the right way. This is making her extremely anxious. Upset with neighborhood in general. Spoke at length to help clam pt down. She is looking for a different apartment. She also is complaining of cold like symptoms. Starting to feel slightly better with cold medications. Complaint of still gaining weight. Will start watching diet more. Will start keeping track of what she is eating. Encouraged her to start exercising more. She does have exercise bike at home.       She will come in for 1x1 with RN sept 24th 130

## 2018-09-20 ENCOUNTER — TELEPHONE (OUTPATIENT)
Dept: INFECTIOUS DISEASES | Age: 58
End: 2018-09-20

## 2018-09-20 NOTE — TELEPHONE ENCOUNTER
Patient called in to reschedule 1x1 with RN. She stated she has to go with her son to the [address] [place] [address] for his apt. Discussed at length home problems. Hubs recently became ill and was hospitalized. he will go to SNF for 2 weeks for rehab then come home. She is now over whelmed with this. She has to care for him now. Son is also having issues with drinking and mental health. rescheduled 1x1 for sept 24 th at 130.

## 2018-09-24 ENCOUNTER — NURSE ONLY (OUTPATIENT)
Dept: INFECTIOUS DISEASES | Age: 58
End: 2018-09-24

## 2018-09-24 NOTE — PROGRESS NOTES
RN Intake      Patient ID:   Harvey Alba  Date:   9/24/2018      Client ID:      [phone] (home)   [x] OK to leave voicemail     Family/House:    [x] Partner- . They are doing better. She is caring for him post hospital stay currently. [] Children  [] Other -     Housing:   home    Transport:  car    Employment:  [employer] [address]. Follow for med changes. Substance Abuse:   none  Social History     Social History    Marital status:      Spouse name: N/A    Number of children: N/A    Years of education: N/A     Occupational History    Not on file. Social History Main Topics    Smoking status: Never Smoker    Smokeless tobacco: Never Used    Alcohol use 0.0 oz/week      Comment: occasional    Drug use: No    Sexual activity: Not Currently     Other Topics Concern    Not on file     Social History Narrative    No narrative on file   \"socially every once in a while\" etoh. No drugs no smoking. Not sexually active currently. Protection discussed. Diagnosis:   Date of diagnosis:  2017   Transmition:  unknown    Signs and symptoms:   [] Diarrhea  [] Night sweats   [] Dizziness  [] Chronic fatigue   [] Vomiting  [] Nausea   [] Hand/feet pain [] Headaches   [] Swollen lymph [] Skin rash   [] Fevers  [] Body aches   [] Weight loss  [] Other -     Opportunistic Infections:     [] Thrush -   [] Wasting   [] KS   [] PCP   [] CMV   [] STD -    Health/Healthcare:     PCP Visit:  Paul Phillips   ID Visit:  Swapna Cannon   Dental Visit:     Last lab work:  As below. Will have more completed prior to apt.      CD4:   Lab Results   Component Value Date    LABABSO 595 05/30/2018       Viral Load:  Lab Results   Component Value Date    [id number] <20 05/30/2018    AYS7CJVYUQ <1.3 05/30/2018       Medical Insurance:  Payor: [facility] [address] / Plan: [facility] [address] / Product Type: *No Product type* /    OHDAP/HIPSCA: boyfriend. This is stressing her out, as this is on/off. Overall she stated she is \"hanging in there. One day at a time\" she will schedule time with RN in 2 weeks or so. She denies other needs.

## 2018-10-04 ENCOUNTER — TELEPHONE (OUTPATIENT)
Dept: INFECTIOUS DISEASES | Age: 58
End: 2018-10-04

## 2018-10-04 NOTE — TELEPHONE ENCOUNTER
Pt called in needing to talk with RN. Discussed stress going on with her . He is recently a new DM. He is not doing DM diet and pt is struggling with caring for him. We discussed this for a while. She stated she did feel some what better after talking. 1x1 time scheduled for oct 16th at 130.

## 2018-10-16 ENCOUNTER — TELEPHONE (OUTPATIENT)
Dept: INFECTIOUS DISEASES | Age: 58
End: 2018-10-16

## 2018-10-19 PROBLEM — R19.5 HEME POSITIVE STOOL: Status: ACTIVE | Noted: 2018-08-03

## 2018-10-19 RX ORDER — IBUPROFEN 600 MG/1
600 TABLET ORAL
COMMUNITY

## 2018-10-19 RX ORDER — LOPERAMIDE HYDROCHLORIDE 2 MG/1
0.5 TABLET ORAL PRN
COMMUNITY

## 2018-10-25 ENCOUNTER — NURSE ONLY (OUTPATIENT)
Dept: INFECTIOUS DISEASES | Age: 58
End: 2018-10-25

## 2018-11-09 ENCOUNTER — TELEPHONE (OUTPATIENT)
Dept: INFECTIOUS DISEASES | Age: 58
End: 2018-11-09

## 2018-11-15 ENCOUNTER — NURSE ONLY (OUTPATIENT)
Dept: INFECTIOUS DISEASES | Age: 58
End: 2018-11-15

## 2018-11-15 PROCEDURE — 90688 IIV4 VACCINE SPLT 0.5 ML IM: CPT | Performed by: INTERNAL MEDICINE

## 2018-11-15 PROCEDURE — G0008 ADMIN INFLUENZA VIRUS VAC: HCPCS | Performed by: INTERNAL MEDICINE

## 2018-11-15 RX ORDER — UREA 40 %
CREAM (GRAM) TOPICAL
COMMUNITY
Start: 2018-10-18 | End: 2022-02-23 | Stop reason: ALTCHOICE

## 2018-11-21 ENCOUNTER — NURSE ONLY (OUTPATIENT)
Dept: INFECTIOUS DISEASES | Age: 58
End: 2018-11-21

## 2018-11-27 ENCOUNTER — NURSE ONLY (OUTPATIENT)
Dept: INFECTIOUS DISEASES | Age: 58
End: 2018-11-27

## 2018-11-27 DIAGNOSIS — B20 HUMAN IMMUNODEFICIENCY VIRUS (HCC): Primary | ICD-10-CM

## 2018-11-28 NOTE — PROGRESS NOTES
Seth Braga / Plan: [facility] [address] / Product Type: *No Product type* /    OHDAP/HIPSCA:     Renewal Date:    NA  Allergies:  is allergic to amoxicillin; clobetasol; doxycycline monohydrate; fish oil; fluconazole; paroxetine; pcn [penicillins]; and statins. Confirmed. Current Medications:  Current Outpatient Prescriptions on File Prior to Visit   Medication Sig Dispense Refill    Urea (CARMOL) 40 % cream Apply topically      vitamin D 1000 units CAPS Take 3,000 Units by mouth      fluocinonide (LIDEX) 0.05 % cream Apply topically      ibuprofen (ADVIL;MOTRIN) 600 MG tablet Take 600 mg by mouth      loperamide (LOPERAMIDE A-D) 2 MG tablet Take 0.5 tablets by mouth      naproxen (NAPROSYN) 500 MG tablet Take 1 tablet by mouth 2 times daily for 20 doses 20 tablet 0    rosuvastatin (CRESTOR) 5 MG tablet       Ghtwtso-Pzkco-Mwxxzekf-TenofAF (GENVOYA) [phone] MG TABS Take 1 tablet by mouth daily      cyanocobalamin 1000 MCG tablet Take 1,000 mcg by mouth      Naproxen Sodium (ALEVE) 220 MG CAPS Take  by mouth.  clonazepam (KLONOPIN) 0.5 MG tablet Take 0.5 mg by mouth 2 times daily as needed.  Multiple Vitamin (MULTIVITAMIN PO) Take 1 tablet by mouth daily. No current facility-administered medications on file prior to visit. clarified all with pt. History of HIV Medications:  Genvoya. 100% compliant.  Denies any problems or side effects    Pharmacy:      [address]  Phone: [phone] Fax: [phone]    confirmed  Legal Issues:        Emergency Contact:   Extended Emergency Contact Information  Primary Emergency Contact: Cailin Hill [address] Phone: [phone]  Relation: Spouse  Secondary Emergency Contact: Julia Barajas[address] Phone: [phone]  Relation: Child  Confirmed no changes  Support System:  Some family, best friend. RN Comments:  Today we also discussed at length pt anxiety about her  and how he does not want to get better. She is worried he wants to die. Feels like he is giving up. She is tearful. Also discussed all vaccines she will need to be updated. Went to eye doctor 11/26/18.

## 2018-12-04 DIAGNOSIS — B20 HIV (HUMAN IMMUNODEFICIENCY VIRUS INFECTION) (HCC): ICD-10-CM

## 2018-12-04 DIAGNOSIS — E78.2 HYPERLIPIDEMIA, MIXED: Primary | ICD-10-CM

## 2018-12-05 ENCOUNTER — NURSE ONLY (OUTPATIENT)
Dept: INFECTIOUS DISEASES | Age: 58
End: 2018-12-05

## 2018-12-05 DIAGNOSIS — B20 HIV (HUMAN IMMUNODEFICIENCY VIRUS INFECTION) (HCC): ICD-10-CM

## 2018-12-05 DIAGNOSIS — E78.2 HYPERLIPIDEMIA, MIXED: ICD-10-CM

## 2018-12-05 LAB
ALBUMIN SERPL-MCNC: 4.5 G/DL (ref 3.9–4.9)
ALP BLD-CCNC: 68 U/L (ref 40–130)
ALT SERPL-CCNC: 26 U/L (ref 0–33)
ANION GAP SERPL CALCULATED.3IONS-SCNC: 13 MEQ/L (ref 7–13)
AST SERPL-CCNC: 21 U/L (ref 0–35)
BILIRUB SERPL-MCNC: 0.5 MG/DL (ref 0–1.2)
BUN BLDV-MCNC: 21 MG/DL (ref 6–20)
CALCIUM SERPL-MCNC: 9.3 MG/DL (ref 8.6–10.2)
CHLORIDE BLD-SCNC: 97 MEQ/L (ref 98–107)
CHOLESTEROL, TOTAL: 195 MG/DL (ref 0–199)
CO2: 31 MEQ/L (ref 22–29)
CREAT SERPL-MCNC: 0.89 MG/DL (ref 0.5–0.9)
GFR AFRICAN AMERICAN: >60
GFR NON-AFRICAN AMERICAN: >60
GLOBULIN: 3.6 G/DL (ref 2.3–3.5)
GLUCOSE BLD-MCNC: 74 MG/DL (ref 74–109)
HCT VFR BLD CALC: 43.2 % (ref 37–47)
HDLC SERPL-MCNC: 51 MG/DL (ref 40–59)
HEMOGLOBIN: 14.7 G/DL (ref 12–16)
HEPATITIS C ANTIBODY INTERPRETATION: NORMAL
LDL CHOLESTEROL CALCULATED: 118 MG/DL (ref 0–129)
MCH RBC QN AUTO: 33.2 PG (ref 27–31.3)
MCHC RBC AUTO-ENTMCNC: 33.9 % (ref 33–37)
MCV RBC AUTO: 97.9 FL (ref 82–100)
PDW BLD-RTO: 13.9 % (ref 11.5–14.5)
PLATELET # BLD: 159 K/UL (ref 130–400)
POTASSIUM SERPL-SCNC: 3.7 MEQ/L (ref 3.5–5.1)
RBC # BLD: 4.41 M/UL (ref 4.2–5.4)
SODIUM BLD-SCNC: 141 MEQ/L (ref 132–144)
TOTAL PROTEIN: 8.1 G/DL (ref 6.4–8.1)
TRIGL SERPL-MCNC: 131 MG/DL (ref 0–200)
WBC # BLD: 4.1 K/UL (ref 4.8–10.8)

## 2018-12-06 ENCOUNTER — NURSE ONLY (OUTPATIENT)
Dept: INFECTIOUS DISEASES | Age: 58
End: 2018-12-06

## 2018-12-06 DIAGNOSIS — B20 HUMAN IMMUNODEFICIENCY VIRUS (HCC): Primary | ICD-10-CM

## 2018-12-07 LAB
ABSOLUTE CD 4 HELPER: 749 CELLS/UL (ref 430–1800)
CD4 % HELPER T CELL: 52 % (ref 32–64)
LYMPHOCYTE SUBSET PANEL 2 INFO: NORMAL

## 2018-12-08 LAB
QUANTI TB GOLD PLUS: NEGATIVE
QUANTI TB1 MINUS NIL: 0.01 IU/ML (ref 0–0.34)
QUANTI TB2 MINUS NIL: 0 IU/ML (ref 0–0.34)
QUANTIFERON MITOGEN: 8 IU/ML
QUANTIFERON NIL: 0.02 IU/ML

## 2018-12-09 LAB
HIV-1 QNT LOG, IU/ML: NOT DETECTED LOG CPY/ML
HIV-1 QNT, IU/ML: NOT DETECTED CPY/ML
INTERPRETATION: NOT DETECTED

## 2018-12-12 ENCOUNTER — TELEPHONE (OUTPATIENT)
Dept: INFECTIOUS DISEASES | Age: 58
End: 2018-12-12

## 2018-12-14 ENCOUNTER — OFFICE VISIT (OUTPATIENT)
Dept: INFECTIOUS DISEASES | Age: 58
End: 2018-12-14
Payer: COMMERCIAL

## 2018-12-14 VITALS
TEMPERATURE: 97.9 F | HEIGHT: 61 IN | RESPIRATION RATE: 18 BRPM | WEIGHT: 145.6 LBS | HEART RATE: 76 BPM | SYSTOLIC BLOOD PRESSURE: 115 MMHG | BODY MASS INDEX: 27.49 KG/M2 | DIASTOLIC BLOOD PRESSURE: 70 MMHG

## 2018-12-14 DIAGNOSIS — B20 HUMAN IMMUNODEFICIENCY VIRUS (HCC): Primary | ICD-10-CM

## 2018-12-14 DIAGNOSIS — R19.7 DIARRHEA, UNSPECIFIED TYPE: ICD-10-CM

## 2018-12-14 DIAGNOSIS — Z86.19 HISTORY OF HPV INFECTION: ICD-10-CM

## 2018-12-14 PROCEDURE — G8419 CALC BMI OUT NRM PARAM NOF/U: HCPCS | Performed by: INTERNAL MEDICINE

## 2018-12-14 PROCEDURE — 3017F COLORECTAL CA SCREEN DOC REV: CPT | Performed by: INTERNAL MEDICINE

## 2018-12-14 PROCEDURE — G8427 DOCREV CUR MEDS BY ELIG CLIN: HCPCS | Performed by: INTERNAL MEDICINE

## 2018-12-14 PROCEDURE — 1036F TOBACCO NON-USER: CPT | Performed by: INTERNAL MEDICINE

## 2018-12-14 PROCEDURE — 99214 OFFICE O/P EST MOD 30 MIN: CPT | Performed by: INTERNAL MEDICINE

## 2018-12-14 PROCEDURE — G8482 FLU IMMUNIZE ORDER/ADMIN: HCPCS | Performed by: INTERNAL MEDICINE

## 2018-12-14 ASSESSMENT — PATIENT HEALTH QUESTIONNAIRE - PHQ9
SUM OF ALL RESPONSES TO PHQ QUESTIONS 1-9: 2
SUM OF ALL RESPONSES TO PHQ QUESTIONS 1-9: 2
SUM OF ALL RESPONSES TO PHQ9 QUESTIONS 1 & 2: 2
1. LITTLE INTEREST OR PLEASURE IN DOING THINGS: 1
2. FEELING DOWN, DEPRESSED OR HOPELESS: 1

## 2019-01-07 ENCOUNTER — NURSE ONLY (OUTPATIENT)
Dept: INFECTIOUS DISEASES | Age: 59
End: 2019-01-07

## 2019-01-16 ENCOUNTER — NURSE ONLY (OUTPATIENT)
Dept: INFECTIOUS DISEASES | Age: 59
End: 2019-01-16

## 2019-01-29 ENCOUNTER — NURSE ONLY (OUTPATIENT)
Dept: INFECTIOUS DISEASES | Age: 59
End: 2019-01-29

## 2019-02-08 ENCOUNTER — TELEPHONE (OUTPATIENT)
Dept: INFECTIOUS DISEASES | Age: 59
End: 2019-02-08

## 2019-02-11 ENCOUNTER — NURSE ONLY (OUTPATIENT)
Dept: INFECTIOUS DISEASES | Age: 59
End: 2019-02-11

## 2019-02-11 RX ORDER — MOMETASONE FUROATE 1 MG/G
CREAM TOPICAL
COMMUNITY
Start: 2018-11-26 | End: 2019-10-30 | Stop reason: ALTCHOICE

## 2019-03-05 ENCOUNTER — TELEPHONE (OUTPATIENT)
Dept: INFECTIOUS DISEASES | Age: 59
End: 2019-03-05

## 2019-03-26 ENCOUNTER — TELEPHONE (OUTPATIENT)
Dept: INFECTIOUS DISEASES | Age: 59
End: 2019-03-26

## 2019-04-02 ENCOUNTER — NURSE ONLY (OUTPATIENT)
Dept: INFECTIOUS DISEASES | Age: 59
End: 2019-04-02

## 2019-04-02 DIAGNOSIS — B20 HUMAN IMMUNODEFICIENCY VIRUS (HCC): Primary | ICD-10-CM

## 2019-04-02 RX ORDER — MELOXICAM 15 MG/1
TABLET ORAL
Refills: 1 | COMMUNITY
Start: 2019-01-17 | End: 2019-10-30 | Stop reason: ALTCHOICE

## 2019-04-02 NOTE — PROGRESS NOTES
RN Intake      Patient ID:   Shakira Stovall  Date:   4/2/2019      Client ID:      [phone] (home)   [x] OK to leave voicemail     Family/House:    [] Partner  [] Children  [x] Other -     Housing:   home    Transport:  car    Employment:  [address]: Follows with [facility] every 3 months- last visit feb 2019- she does have some medication concerns- she is reluctant to try Paxil. She is used to [employer] for anxiety. She will follow up next month and then update. Substance Abuse:      Social History     Socioeconomic History    Marital status:      Spouse name: Not on file    Number of children: Not on file    Years of education: Not on file    Highest education level: Not on file   Occupational History    Not on file   Social Needs    Financial resource strain: Not on file    Food insecurity:     Worry: Not on file     Inability: Not on file    Transportation needs:     Medical: Not on file     Non-medical: Not on file   Tobacco Use    Smoking status: Never Smoker    Smokeless tobacco: Never Used   Substance and Sexual Activity    Alcohol use:  Yes     Alcohol/week: 0.0 oz     Comment: occasional    Drug use: No    Sexual activity: Not Currently   Lifestyle    Physical activity:     Days per week: Not on file     Minutes per session: Not on file    Stress: Not on file   Relationships    Social connections:     Talks on phone: Not on file     Gets together: Not on file     Attends Restoration service: Not on file     Active member of club or organization: Not on file     Attends meetings of clubs or organizations: Not on file     Relationship status: Not on file    Intimate partner violence:     Fear of current or ex partner: Not on file     Emotionally abused: Not on file     Physically abused: Not on file     Forced sexual activity: Not on file   Other Topics Concern    Not on file   Social History Narrative    Not on file denies smoking  Denies drug use  A glass of wine every 2-3 months  Not sexaully active with  for 8 years. Has other \"friend\" not sexually active at this time. She is aware of need to use protection and aware of law. provided information regarding safe sex practices, PrEP, and U=U, undetectable equals un-transmittable. explained the unlikelihood of HIV transmission if a pt has been undetectable (viral load). Safe sex practices are still encouraged to prevent transmission of other STI's, such as, chlamydia, herpes, GC. Diagnosis:   Date of diagnosis:     Transmition:  heterosexual contact   Signs and symptoms:   [] Diarrhea  [] Night sweats   [] Dizziness  [] Chronic fatigue   [] Vomiting  [] Nausea   [] Hand/feet pain [] Headaches   [] Swollen lymph [] Skin rash   [] Fevers  [] Body aches   [] Weight loss  [] Other - weight gain  Opportunistic Infections:     [] Thrush -   [] Wasting   [] KS   [] PCP   [] CMV   [] STD -  Denies. Health/Healthcare:     PCP Visit:  Dr Sebastian Ni   ID Visit:  Dr Latrice Wadsworth Dec 2018 last visit   Dental Visit:  Greater than a year ago. Encouraged and educated pt about importance of twice a year follow up. Follows with Dr Gianna Dudley lab work:  As below. Went to eye doctor 1/31/19   CD4:   Lab Results   Component Value Date    LABABSO 749 12/05/2018       Viral Load:  Lab Results   Component Value Date    [id number] <20 05/30/2018    [id number] Not Detected 12/05/2018   education provided pt on CD4 and VL    Medical Insurance:  Payor: Jose Elias Hernandez / Plan: Stevie Field / Product Type: *No Product type* /    OHDAP/HIPSCA:  Not needed   Renewal Date:    Updated today with eligibility. Allergies:  is allergic to amoxicillin; clobetasol; doxycycline monohydrate; fish oil; fluconazole; paroxetine; pcn [penicillins]; and statins.   confirmed  Current Medications:  Current Outpatient Medications on File Prior to Visit   Medication Sig she will be able to come. 4/11/19  Pt in need of help with gas-  Needs assessed. 1 gas card given to help with needs. Otherwise denies needs. She will call next week also to set up next 1x1 apt.

## 2019-04-03 ENCOUNTER — TELEPHONE (OUTPATIENT)
Dept: INFECTIOUS DISEASES | Age: 59
End: 2019-04-03

## 2019-04-03 NOTE — TELEPHONE ENCOUNTER
Pt called in requesting to schedule 1x1 with RN  Simona scheduled for 4/23/19 at 1;30  She also requested that we check her weight and BP checked. Denies other needs. Refills for genvoya were called in per Dr Young Ellis.

## 2019-04-04 NOTE — PROGRESS NOTES
Re-Assessment      Patient ID:   Karmen Silva  Date:   4/4/2019      Client ID:  [id number]    [place] [id number] [phone] (home)     Family/House:    [x] Partner  [] Children  [] Other -     Mental Health: Follows with [facility] pt Anxiety, Major Depression, Claustrophobic, Agoraphobia, Panic Attacks    Substance Abuse:   N/A  Social History     Socioeconomic History    Marital status:      Spouse name: Not on file    Number of children: Not on file    Years of education: Not on file    Highest education level: Not on file   Occupational History    Not on file   Social Needs    Financial resource strain: Not on file    Food insecurity:     Worry: Not on file     Inability: Not on file    Transportation needs:     Medical: Not on file     Non-medical: Not on file   Tobacco Use    Smoking status: Never Smoker    Smokeless tobacco: Never Used   Substance and Sexual Activity    Alcohol use: Yes     Alcohol/week: 0.0 oz     Comment: occasional    Drug use: No    Sexual activity: Not Currently   Lifestyle    Physical activity:     Days per week: Not on file     Minutes per session: Not on file    Stress: Not on file   Relationships    Social connections:     Talks on phone: Not on file     Gets together: Not on file     Attends Hinduism service: Not on file     Active member of club or organization: Not on file     Attends meetings of clubs or organizations: Not on file     Relationship status: Not on file    Intimate partner violence:     Fear of current or ex partner: Not on file     Emotionally abused: Not on file     Physically abused: Not on file     Forced sexual activity: Not on file   Other Topics Concern    Not on file   Social History Narrative    Not on file       Housing:   with family    Health/Healthcare:  Stable   Physician Visit: Next appt 6/14/19   Dental Visit: >1yr.  Encouraged to follow up Q 6 months    CD4:   Lab Results   Component Value Date    LABABSO 749 12/05/2018       Viral Load:  Lab Results   Component Value Date    [id number] <20 05/30/2018    [id number] Not Detected 12/05/2018       Medical Insurance:  Payor: [employer] Wiley Doe / Plan: [address] / Product Type: *No Product type* /    OHDAP/HIPSCA:  Not needed   Renewal Date:      Income:    $822/month- SSI    Legal Issues:    N/A    Emergency Contact:   Extended Emergency Contact Information  Primary Emergency Contact: Milly Hernandez of [address] Phone: [phone]  Relation: Spouse  Secondary Emergency Contact: [address] [place] of [address] Phone: [phone]  Relation: Child       CM met with pt. CM completed SA/MH assessment. Pt currently follows with [facility]. CM completed PSA. Encouraged pt to schedule follow up with Dentist. Last seen >1yr. CM completed ISP. Pt signed in agreeance with POC. Reviewed transport policy, pt rights and responsibilities, grievance policy, RW sliding fee scale, consent for services and network release. Pt signed in agreeance. Verbalized understanding. CM will continue to follow.

## 2019-04-09 ENCOUNTER — OFFICE VISIT (OUTPATIENT)
Dept: OBGYN CLINIC | Age: 59
End: 2019-04-09
Payer: MEDICARE

## 2019-04-09 VITALS
HEIGHT: 61 IN | HEART RATE: 80 BPM | SYSTOLIC BLOOD PRESSURE: 136 MMHG | DIASTOLIC BLOOD PRESSURE: 86 MMHG | WEIGHT: 155 LBS | BODY MASS INDEX: 29.27 KG/M2

## 2019-04-09 DIAGNOSIS — Z01.419 VISIT FOR GYNECOLOGIC EXAMINATION: ICD-10-CM

## 2019-04-09 DIAGNOSIS — N32.81 OAB (OVERACTIVE BLADDER): ICD-10-CM

## 2019-04-09 DIAGNOSIS — Z12.31 VISIT FOR SCREENING MAMMOGRAM: ICD-10-CM

## 2019-04-09 DIAGNOSIS — Z11.51 SCREENING FOR HPV (HUMAN PAPILLOMAVIRUS): ICD-10-CM

## 2019-04-09 DIAGNOSIS — Z01.419 VISIT FOR GYNECOLOGIC EXAMINATION: Primary | ICD-10-CM

## 2019-04-09 PROCEDURE — 99396 PREV VISIT EST AGE 40-64: CPT | Performed by: OBSTETRICS & GYNECOLOGY

## 2019-04-09 RX ORDER — OXYBUTYNIN CHLORIDE 5 MG/1
5 TABLET, EXTENDED RELEASE ORAL DAILY
Qty: 30 TABLET | Refills: 0 | Status: SHIPPED | OUTPATIENT
Start: 2019-04-09 | End: 2019-10-30

## 2019-04-09 NOTE — PROGRESS NOTES
Postmenopausal Annual    Subjective:     Darilyn Crigler is a 62y.o. year old female    female who presents today for her annual well woman exam.  History of HIV . The patient is not sexuallyactive. The patient is not taking hormone replacement therapy. Patient denies post-menopausal vaginal bleeding. The patient has regular exercise: no . Follows with Dr Rama Landaverde. Complains also of urinary urgency , frequency , incontinence for month with negative UA and Cx. Vitals:  /86 (Site: Right Upper Arm, Position: Sitting, Cuff Size: Medium Adult)   Pulse 80   Ht 5' 1\" (1.549 m)   Wt 155 lb (70.3 kg)   LMP 2012   BMI 29.29 kg/m²   Allergies:  Amoxicillin; Clobetasol; Doxycycline monohydrate;  Fish oil; Fluconazole; Paroxetine; Pcn [penicillins]; and Statins  Past Medical History:   Diagnosis Date    Anemia     Ankle fracture, right     in the past    Anxiety     Benign breast disease     right    Bipolar disorder (HCC)     Carpal tunnel syndrome     Cervicalgia     and cervical disc herniation (C4-C5 level) leading to right arm pain    Chronic cough     COPD (chronic obstructive pulmonary disease) (HCC)     at small airway level (PFT's with negative methacholine challenge test:  2009)    Elevated BP     Fracture of left clavicle     as a child    HIV (human immunodeficiency virus infection) ([facility] [address] 2017    Hyperlipidemia     Irritable bowel syndrome     Menorrhagia     Migraine     Panic attacks     PCOS (polycystic ovarian syndrome)     Right shoulder pain      Past Surgical History:   Procedure Laterality Date    CARPAL TUNNEL RELEASE      right    HEMORRHOID SURGERY      WRIST SURGERY      left     Family History   Problem Relation Age of Onset    Cancer Mother         breast    Heart Disease Brother 48    Diabetes Brother     Cancer Maternal Aunt         breast    Hypertension Father     Liver Disease Father     Cancer Maternal Aunt         skin    Stroke Sister      Social History     Socioeconomic History    Marital status:      Spouse name: Not on file    Number of children: Not on file    Years of education: Not on file    Highest education level: Not on file   Occupational History    Not on file   Social Needs    Financial resource strain: Not on file    Food insecurity:     Worry: Not on file     Inability: Not on file    Transportation needs:     Medical: Not on file     Non-medical: Not on file   Tobacco Use    Smoking status: Never Smoker    Smokeless tobacco: Never Used   Substance and Sexual Activity    Alcohol use: Yes     Alcohol/week: 0.0 oz     Comment: occasional    Drug use: No    Sexual activity: Not Currently   Lifestyle    Physical activity:     Days per week: Not on file     Minutes per session: Not on file    Stress: Not on file   Relationships    Social connections:     Talks on phone: Not on file     Gets together: Not on file     Attends Mandaeism service: Not on file     Active member of club or organization: Not on file     Attends meetings of clubs or organizations: Not on file     Relationship status: Not on file    Intimate partner violence:     Fear of current or ex partner: Not on file     Emotionally abused: Not on file     Physically abused: Not on file     Forced sexual activity: Not on file   Other Topics Concern    Not on file   Social History Narrative    Not on file         Last mammogram 1 yr ago - wnl at     Breast cancer risk factors : mother with history of breast cancer. 60's. Last Paplast year , + history for abnormal pap. ASCUS; HPV Positive    Patient's last menstrual period was 05/05/2012. Age at menopause onset: 49 yo   Menopausal symptom assessment:  Vasomotor symptoms: occasional   Urinary incontinence &  symptoms: positive, urgency , frequency , incontinence.    Sexual dysfunction: not sexually   Present Hormonalmedications: denies     Osteoporosis risk assessment : Small body frame  Last bone mineral density : denies     History of abnormal lipids: no  Hypertension no  Stroke/MI no    Yearly flu vaccine recommended for persons aged 48 and older. Colonoscopy completed - wnl. Review of Systems  Review of Systems  All other systems reviewed and are negative. Review of Systems   Constitutional: Negative for chills and fever. Respiratory: Negative for cough and shortness of breath. Cardiovascular: Negative for chest painand palpitations. Gastrointestinal: Negative for nausea and vomiting. Genitourinary: Negative for dysuria, frequency and urgency. Musculoskeletal: Negative formyalgias. Neurological: Negative for dizziness, seizures and headaches. Psychiatric/Behavioral: Negative for depression and suicidal ideas. Objective:     Vitals:  /86 (Site: Right Upper Arm, Position: Sitting, Cuff Size: Medium Adult)   Pulse 80   Ht 5' 1\" (1.549 m)   Wt 155 lb (70.3 kg)   LMP 05/05/2012   BMI 29.29 kg/m²     Physical Exam  Physical Exam    Constitutional: She is oriented to person, place, and time and well-developed, well-nourished, and in nodistress. HENT:   Head: Normocephalic and atraumatic. Eyes: Conjunctivae are normal. Pupils are equal, round, and reactive to light. Neck: Normal range ofmotion. Neck supple. Cardiovascular: Normal rate and regular rhythm. Pulmonary/Chest: Effort normal and breath sounds normal. No respiratory distress. Right breast exhibits noinverted nipple, no mass, no nipple discharge, no skin change and no tenderness. Left breast exhibits no inverted nipple, no mass, no nipple discharge, no skin change and no tenderness. Breasts are symmetrical.   Abdominal: Soft. Bowel sounds are normal.   Genitourinary: Vagina normal, uterus normal and cervix normal. No vaginal discharge found. Musculoskeletal: Normal range ofmotion. Neurological: She is alert and oriented to person, place, and time. She has normal reflexes. Psychiatric: Memory, affect andjudgment normal.       Assessment:      Diagnosis Orders   1. Visit for gynecologic examination  Pap Smear   2. Screening for HPV (human papillomavirus)  Pap Smear   3. Visit for screening mammogram  LUANNE DIGITAL SCREEN W OR WO CAD BILATERAL   4. OAB (overactive bladder)          Body mass index is 29.29 kg/m². Obesity:  Overweight  Smoking:  No    Plan:   Pap : indicated:  performed. Mammogram ordered  Normal breast exam .   Trial of anticholinergic given due to overactive bladder symptoms, patient use medication for 2 weeks and return to report results. Obesity Counseling:  Given  Smoking Counseling:N/A    Orders Placed This Encounter   Procedures    LUANNE DIGITAL SCREEN W OR WO CAD BILATERAL     Standing Status:   Future     Standing Expiration Date:   6/9/2020     Order Specific Question:   Reason for exam:     Answer:   screening    Pap Smear     Patient History:    Patient's last menstrual period was 05/05/2012. OBGYN Status: Postmenopausal  Past Surgical History:  No date: CARPAL TUNNEL RELEASE      Comment:  right  No date: HEMORRHOID SURGERY  No date: WRIST SURGERY      Comment:  left    Problem List        Edg Problems Affecting Cytology    Dysplasia of cervix, low grade (MALICK 1)      Social History    Tobacco Use      Smoking status: Never Smoker      Smokeless tobacco: Never Used       Standing Status:   Future     Number of Occurrences:   1     Standing Expiration Date:   4/9/2020     Order Specific Question:   Collection Type     Answer: Thin Prep     Order Specific Question:   Prior Abnormal Pap Test     Answer:   No     Order Specific Question:   Screening or Diagnostic     Answer:   Screening     Order Specific Question:   HPV Requested?      Answer:   Yes     Order Specific Question:   High Risk Patient     Answer:   N/A       Orders Placed This Encounter   Medications    oxybutynin (DITROPAN-XL) 5 MG extended release tablet     Sig: Take 1 tablet by mouth daily Dispense:  30 tablet     Refill:  0       Follow up:  No follow-ups on file. Jerrica Gale MD        HEALTH MAINTENANCE:   Health information given. Women's Health patient information and counselling done. Counseled regarding risk/benefits/alternatives to Hormone Therapy and need for yearly re-evaluation. Periodic Pap smear discussed. Mammogram recommended yearly. Colon cancer screening by age 48 & every 5-10years. Bone mineral density (by age 72 or sooner if indication it would affect Hormone Therapy management or other risk factors for osteoporosis). Claudell Pitt M.D., F.A.C.O. G

## 2019-04-15 ENCOUNTER — HOSPITAL ENCOUNTER (OUTPATIENT)
Dept: WOMENS IMAGING | Age: 59
Discharge: HOME | End: 2019-04-17
Payer: MEDICARE

## 2019-04-15 DIAGNOSIS — Z12.31 VISIT FOR SCREENING MAMMOGRAM: ICD-10-CM

## 2019-04-15 PROCEDURE — 77067 SCR MAMMO BI INCL CAD: CPT

## 2019-04-16 LAB
HPV COMMENT: NORMAL
HPV TYPE 16: NOT DETECTED
HPV TYPE 18: NOT DETECTED
HPVOH (OTHER TYPES): NOT DETECTED

## 2019-04-23 ENCOUNTER — NURSE ONLY (OUTPATIENT)
Dept: INFECTIOUS DISEASES | Age: 59
End: 2019-04-23

## 2019-04-23 NOTE — PROGRESS NOTES
Pt presented for scheduled 1x1. Doing ok.  is main stressor still. We discussed a weight loss plan. She has decided to cut down on sugar and carbs. She has a stationary bike at home she will use for 10 mins twice daily. She states this is obtainable. She decided start day will be tomorrow. End date will be 6-14-19. Then go from there. Otherwise she is doing ok. Spending time with family. Needs assessed and 1 food voucher given to pt.

## 2019-05-09 PROBLEM — Z01.419 VISIT FOR GYNECOLOGIC EXAMINATION: Status: RESOLVED | Noted: 2019-04-09 | Resolved: 2019-05-09

## 2019-05-09 PROBLEM — Z12.31 VISIT FOR SCREENING MAMMOGRAM: Status: RESOLVED | Noted: 2019-04-09 | Resolved: 2019-05-09

## 2019-05-09 PROBLEM — Z11.51 SCREENING FOR HPV (HUMAN PAPILLOMAVIRUS): Status: RESOLVED | Noted: 2019-04-09 | Resolved: 2019-05-09

## 2019-05-13 ENCOUNTER — TELEPHONE (OUTPATIENT)
Dept: INFECTIOUS DISEASES | Age: 59
End: 2019-05-13

## 2019-05-13 NOTE — TELEPHONE ENCOUNTER
Pt called in needing to talk. 1x1. She is tearful on phone. She states her  is not doing well at all. \"things are not looking good\" \"he has kidney failure and is still intubated. \"   She is working on getting things set up  wise \"just in case, I want to be prepared and give him the  he wants\"     She also discussed how his cat was found dead , \"this will be to much for him if he pulls threw\"     She is worried about living a lone. Re assured pt. She will still be attending support group tomorrow.  Scheduled 1x1 Tuesday 21st at 130 as well/

## 2019-05-21 ENCOUNTER — TELEPHONE (OUTPATIENT)
Dept: INFECTIOUS DISEASES | Age: 59
End: 2019-05-21

## 2019-05-21 NOTE — TELEPHONE ENCOUNTER
Called to discuss 1x1 schedule for today. Wanting to confirm apt. .     Pt is upset on phone stating her \" is dying\" cant come in today but will talk over phone for 1x1     is now on HD, unable to wean off. Possible trach. She is looking into POA. This is stressing her out a lot. She will call with updates and when she wants to reschedule 1x1 or talk over phone again.

## 2019-06-03 DIAGNOSIS — B20 HIV (HUMAN IMMUNODEFICIENCY VIRUS INFECTION) (HCC): Primary | ICD-10-CM

## 2019-06-03 DIAGNOSIS — E78.5 HYPERLIPIDEMIA, UNSPECIFIED HYPERLIPIDEMIA TYPE: ICD-10-CM

## 2019-06-04 ENCOUNTER — TELEPHONE (OUTPATIENT)
Dept: INFECTIOUS DISEASES | Age: 59
End: 2019-06-04

## 2019-06-05 ENCOUNTER — NURSE ONLY (OUTPATIENT)
Dept: INFECTIOUS DISEASES | Age: 59
End: 2019-06-05

## 2019-06-05 DIAGNOSIS — E78.5 HYPERLIPIDEMIA, UNSPECIFIED HYPERLIPIDEMIA TYPE: ICD-10-CM

## 2019-06-05 DIAGNOSIS — B20 HUMAN IMMUNODEFICIENCY VIRUS (HCC): Primary | ICD-10-CM

## 2019-06-05 DIAGNOSIS — B20 HIV (HUMAN IMMUNODEFICIENCY VIRUS INFECTION) (HCC): ICD-10-CM

## 2019-06-05 PROBLEM — H01.139 ECZEMA OF EYELID: Status: ACTIVE | Noted: 2019-05-30

## 2019-06-05 LAB
ALBUMIN SERPL-MCNC: 4.5 G/DL (ref 3.5–4.6)
ALP BLD-CCNC: 73 U/L (ref 40–130)
ALT SERPL-CCNC: 42 U/L (ref 0–33)
ANION GAP SERPL CALCULATED.3IONS-SCNC: 17 MEQ/L (ref 9–15)
AST SERPL-CCNC: 27 U/L (ref 0–35)
BACTERIA: NEGATIVE /HPF
BILIRUB SERPL-MCNC: 0.3 MG/DL (ref 0.2–0.7)
BILIRUBIN URINE: NEGATIVE
BLOOD, URINE: NEGATIVE
BUN BLDV-MCNC: 19 MG/DL (ref 6–20)
CALCIUM SERPL-MCNC: 9.1 MG/DL (ref 8.5–9.9)
CHLORIDE BLD-SCNC: 99 MEQ/L (ref 95–107)
CHOLESTEROL, TOTAL: 229 MG/DL (ref 0–199)
CLARITY: CLEAR
CO2: 27 MEQ/L (ref 20–31)
COLOR: YELLOW
CREAT SERPL-MCNC: 0.71 MG/DL (ref 0.5–0.9)
EPITHELIAL CELLS, UA: NORMAL /HPF (ref 0–5)
GFR AFRICAN AMERICAN: >60
GFR NON-AFRICAN AMERICAN: >60
GLOBULIN: 2.7 G/DL (ref 2.3–3.5)
GLUCOSE BLD-MCNC: 77 MG/DL (ref 70–99)
GLUCOSE URINE: NEGATIVE MG/DL
HCT VFR BLD CALC: 43.2 % (ref 37–47)
HDLC SERPL-MCNC: 50 MG/DL (ref 40–59)
HEMOGLOBIN: 14.6 G/DL (ref 12–16)
HEPATITIS C ANTIBODY INTERPRETATION: NORMAL
HYALINE CASTS: NORMAL /HPF (ref 0–5)
KETONES, URINE: NEGATIVE MG/DL
LDL CHOLESTEROL CALCULATED: 144 MG/DL (ref 0–129)
LEUKOCYTE ESTERASE, URINE: ABNORMAL
MCH RBC QN AUTO: 33 PG (ref 27–31.3)
MCHC RBC AUTO-ENTMCNC: 33.9 % (ref 33–37)
MCV RBC AUTO: 97.3 FL (ref 82–100)
NITRITE, URINE: NEGATIVE
PDW BLD-RTO: 14.1 % (ref 11.5–14.5)
PH UA: 6 (ref 5–9)
PLATELET # BLD: 174 K/UL (ref 130–400)
POTASSIUM SERPL-SCNC: 3.6 MEQ/L (ref 3.4–4.9)
PROTEIN UA: NEGATIVE MG/DL
RBC # BLD: 4.44 M/UL (ref 4.2–5.4)
RBC UA: NORMAL /HPF (ref 0–5)
SODIUM BLD-SCNC: 143 MEQ/L (ref 135–144)
SPECIFIC GRAVITY UA: 1.02 (ref 1–1.03)
TOTAL PROTEIN: 7.2 G/DL (ref 6.3–8)
TRIGL SERPL-MCNC: 175 MG/DL (ref 0–150)
URINE REFLEX TO CULTURE: YES
UROBILINOGEN, URINE: 0.2 E.U./DL
WBC # BLD: 5 K/UL (ref 4.8–10.8)
WBC UA: NORMAL /HPF (ref 0–5)

## 2019-06-06 LAB
ABSOLUTE CD 4 HELPER: 737 CELLS/UL (ref 430–1800)
CD4 % HELPER T CELL: 55 % (ref 32–64)
LYMPHOCYTE SUBSET PANEL 2 INFO: NORMAL
RPR: NORMAL
URINE CULTURE, ROUTINE: NORMAL

## 2019-06-08 LAB
QUANTI TB GOLD PLUS: NEGATIVE
QUANTI TB1 MINUS NIL: 0.01 IU/ML (ref 0–0.34)
QUANTI TB2 MINUS NIL: 0.01 IU/ML (ref 0–0.34)
QUANTIFERON MITOGEN: 8.01 IU/ML
QUANTIFERON NIL: 0.01 IU/ML

## 2019-06-11 LAB
C. TRACHOMATIS DNA ,URINE: NEGATIVE
N. GONORRHOEAE DNA, URINE: NEGATIVE

## 2019-06-14 ENCOUNTER — OFFICE VISIT (OUTPATIENT)
Dept: INFECTIOUS DISEASES | Age: 59
End: 2019-06-14
Payer: MEDICARE

## 2019-06-14 VITALS
RESPIRATION RATE: 20 BRPM | WEIGHT: 156 LBS | SYSTOLIC BLOOD PRESSURE: 133 MMHG | HEIGHT: 61 IN | DIASTOLIC BLOOD PRESSURE: 82 MMHG | BODY MASS INDEX: 29.45 KG/M2 | HEART RATE: 73 BPM | TEMPERATURE: 98 F

## 2019-06-14 DIAGNOSIS — Z21 ASYMPTOMATIC HIV INFECTION (HCC): Primary | ICD-10-CM

## 2019-06-14 DIAGNOSIS — Z86.19 HISTORY OF HPV INFECTION: ICD-10-CM

## 2019-06-14 PROCEDURE — 1036F TOBACCO NON-USER: CPT | Performed by: INTERNAL MEDICINE

## 2019-06-14 PROCEDURE — G8427 DOCREV CUR MEDS BY ELIG CLIN: HCPCS | Performed by: INTERNAL MEDICINE

## 2019-06-14 PROCEDURE — 3017F COLORECTAL CA SCREEN DOC REV: CPT | Performed by: INTERNAL MEDICINE

## 2019-06-14 PROCEDURE — 99214 OFFICE O/P EST MOD 30 MIN: CPT | Performed by: INTERNAL MEDICINE

## 2019-06-14 PROCEDURE — G8419 CALC BMI OUT NRM PARAM NOF/U: HCPCS | Performed by: INTERNAL MEDICINE

## 2019-06-14 NOTE — PROGRESS NOTES
Routine follow up. Upset about . Worried. Has been discussing DNR? he is high risk. He is unstable at this time. Discussing ltac? Still working on [employer] well with meds. Trying not to forget. .. Neighbor is helping remind her. Still working. Taking care of family. Requesting assistance with food. Needs assessed. 1 food voucher given to pt.

## 2019-06-18 ENCOUNTER — TELEPHONE (OUTPATIENT)
Dept: INFECTIOUS DISEASES | Age: 59
End: 2019-06-18

## 2019-06-18 NOTE — TELEPHONE ENCOUNTER
Pt called in tearful. She is having more and more stress regarding her husbands declining condition. She wanted to talk out a few ideas. Discussed transport as she needs to be out there more. She said her friend is taking her. actively listened and reassured as much as possible. 1x1 scheduled for 20th at 130.

## 2019-06-19 ENCOUNTER — NURSE ONLY (OUTPATIENT)
Dept: INFECTIOUS DISEASES | Age: 59
End: 2019-06-19

## 2019-06-25 ENCOUNTER — NURSE ONLY (OUTPATIENT)
Dept: INFECTIOUS DISEASES | Age: 59
End: 2019-06-25

## 2019-07-01 NOTE — PROGRESS NOTES
Initial HIV Consult      Patient Name: Geremias Woodson  YOB: 1960  Patient Sex: female  Medical Record Number: [id number]    Background:  Diagnosed 2017  Symptoms started with UTI. WBCs have been low for years - possibility older infection  Lives with  - hiv negative. Has current other sexual partners - per patient all negative. No THC and no soking. Periodic alcohol  No travel out of [place]. Cat at home. No TB history  Has had trichomonas in the past. Possible BV. +anal warts history    Patient is very anxious - her  [facility] [place]. She is unable to go down there due to lack of transportation. Regarding her health, overall seems to be doing ok. Review of Systems:  All 14 systems reviewed with patient and are negative other than as stated above. Medication History  Current Outpatient Medications   Medication Sig Dispense Refill    hydrocortisone 2.5 % ointment Apply twice daily to eyelid area.  oxybutynin (DITROPAN-XL) 5 MG extended release tablet Take 1 tablet by mouth daily 30 tablet 0    meloxicam (MOBIC) 15 MG tablet TAKE 1 TABLET BY MOUTH EVERY DAY  1    Neomycin-Polymyxin-Dexameth (MAXITROL) 0.1 % OINT Use twice a day to affected eyelid x 2 wks      mometasone (ELOCON) 0.1 % cream Apply topically      Urea (CARMOL) 40 % cream Apply topically      fluocinonide (LIDEX) 0.05 % cream Apply topically      ibuprofen (ADVIL;MOTRIN) 600 MG tablet Take 600 mg by mouth      loperamide (LOPERAMIDE A-D) 2 MG tablet Take 0.5 tablets by mouth      rosuvastatin (CRESTOR) 5 MG tablet 5 mg every other day       Syidayh-Vqjhg-Jpevsdob-TenofAF (GENVOYA) [phone] MG TABS Take 1 tablet by mouth daily      cyanocobalamin 1000 MCG tablet Take 1,000 mcg by mouth      clonazepam (KLONOPIN) 0.5 MG tablet Take 0.5 mg by mouth 2 times daily as needed.  Multiple Vitamin (MULTIVITAMIN PO) Take 1 tablet by mouth daily.        No

## 2019-07-02 NOTE — PROGRESS NOTES
PATIENT WAS IN THE CLINIC TODAY AND RECEIVED THE FOLLOWING VACCINE Valeria Patterson #3ROM THE [address] [place] [address] NURSE:      THIS NOTE IS SOLELY FOR DOCUMENTATION PURPOSES AND THERE IS NO CHARGE. THESE VACCINES WERE PAID FOR BY EITHER THE STATE OR THE PATIENT'S INSURANCE PLAN. PATIENT WAS INSTRUCTED TO WAIT 20 MINUTES AFTERWARDS, AND REPORT ANY ADVERSE REACTIONS, IMMEDIATELY.

## 2019-07-11 ENCOUNTER — NURSE ONLY (OUTPATIENT)
Dept: INFECTIOUS DISEASES | Age: 59
End: 2019-07-11

## 2019-07-11 DIAGNOSIS — B20 HIV INFECTION, UNSPECIFIED SYMPTOM STATUS (HCC): Primary | ICD-10-CM

## 2019-07-30 ENCOUNTER — NURSE ONLY (OUTPATIENT)
Dept: INFECTIOUS DISEASES | Age: 59
End: 2019-07-30

## 2019-07-30 DIAGNOSIS — B20 HIV INFECTION, UNSPECIFIED SYMPTOM STATUS (HCC): Primary | ICD-10-CM

## 2019-08-02 ENCOUNTER — TELEPHONE (OUTPATIENT)
Dept: INFECTIOUS DISEASES | Age: 59
End: 2019-08-02

## 2019-08-14 ENCOUNTER — TELEPHONE (OUTPATIENT)
Dept: INFECTIOUS DISEASES | Age: 59
End: 2019-08-14

## 2019-08-20 ENCOUNTER — TELEPHONE (OUTPATIENT)
Dept: INFECTIOUS DISEASES | Age: 59
End: 2019-08-20

## 2019-08-29 ENCOUNTER — NURSE ONLY (OUTPATIENT)
Dept: INFECTIOUS DISEASES | Age: 59
End: 2019-08-29

## 2019-08-29 NOTE — PROGRESS NOTES
Presents for scheduled 1x1 today. Very tearful.  is not doing well \"he took a turn for the worse, they are talking about DNR. \" we spoke at length about this. Listened actively and supported pt. We talked about her panic attacks about going out to visit him as he is in [address] [place] [address].. Other than her , she is working on getting some teeth pulled and following up with that. She will be scheduling a PCP appt and OBGYN for follow up. Denies any needs that need addressed with a doctor today. Further 1x1 scheduled. She will call with updates. She is unable to make it to upcoming support group.

## 2019-09-17 ENCOUNTER — NURSE ONLY (OUTPATIENT)
Dept: INFECTIOUS DISEASES | Age: 59
End: 2019-09-17

## 2019-09-17 DIAGNOSIS — B20 HIV INFECTION, UNSPECIFIED SYMPTOM STATUS (HCC): Primary | ICD-10-CM

## 2019-09-26 ENCOUNTER — NURSE ONLY (OUTPATIENT)
Dept: INFECTIOUS DISEASES | Age: 59
End: 2019-09-26

## 2019-09-26 DIAGNOSIS — B20 HIV INFECTION, UNSPECIFIED SYMPTOM STATUS (HCC): Primary | ICD-10-CM
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
## 2019-10-03 ENCOUNTER — TELEPHONE (OUTPATIENT)
Dept: INFECTIOUS DISEASES | Age: 59
End: 2019-10-03

## 2019-10-04 ENCOUNTER — TELEPHONE (OUTPATIENT)
Dept: INFECTIOUS DISEASES | Age: 59
End: 2019-10-04

## 2019-10-08 DIAGNOSIS — Z21 ASYMPTOMATIC HIV INFECTION (HCC): Primary | ICD-10-CM

## 2019-10-09 ENCOUNTER — NURSE ONLY (OUTPATIENT)
Dept: INFECTIOUS DISEASES | Age: 59
End: 2019-10-09

## 2019-10-09 DIAGNOSIS — B20 HUMAN IMMUNODEFICIENCY VIRUS (HCC): Primary | ICD-10-CM

## 2019-10-09 DIAGNOSIS — Z21 ASYMPTOMATIC HIV INFECTION (HCC): ICD-10-CM

## 2019-10-09 LAB
ALBUMIN SERPL-MCNC: 4.2 G/DL (ref 3.5–4.6)
ALP BLD-CCNC: 67 U/L (ref 40–130)
ALT SERPL-CCNC: 25 U/L (ref 0–33)
ANION GAP SERPL CALCULATED.3IONS-SCNC: 16 MEQ/L (ref 9–15)
AST SERPL-CCNC: 20 U/L (ref 0–35)
BILIRUB SERPL-MCNC: 0.3 MG/DL (ref 0.2–0.7)
BUN BLDV-MCNC: 16 MG/DL (ref 6–20)
CALCIUM SERPL-MCNC: 8.8 MG/DL (ref 8.5–9.9)
CHLORIDE BLD-SCNC: 100 MEQ/L (ref 95–107)
CO2: 26 MEQ/L (ref 20–31)
CREAT SERPL-MCNC: 0.8 MG/DL (ref 0.5–0.9)
GFR AFRICAN AMERICAN: >60
GFR NON-AFRICAN AMERICAN: >60
GLOBULIN: 2.8 G/DL (ref 2.3–3.5)
GLUCOSE BLD-MCNC: 110 MG/DL (ref 70–99)
HCT VFR BLD CALC: 41.9 % (ref 37–47)
HEMOGLOBIN: 14 G/DL (ref 12–16)
MCH RBC QN AUTO: 32.4 PG (ref 27–31.3)
MCHC RBC AUTO-ENTMCNC: 33.6 % (ref 33–37)
MCV RBC AUTO: 96.7 FL (ref 82–100)
PDW BLD-RTO: 14 % (ref 11.5–14.5)
PLATELET # BLD: 179 K/UL (ref 130–400)
POTASSIUM SERPL-SCNC: 3.7 MEQ/L (ref 3.4–4.9)
RBC # BLD: 4.33 M/UL (ref 4.2–5.4)
SODIUM BLD-SCNC: 142 MEQ/L (ref 135–144)
TOTAL PROTEIN: 7 G/DL (ref 6.3–8)
WBC # BLD: 5.1 K/UL (ref 4.8–10.8)

## 2019-10-11 LAB
ABSOLUTE CD 4 HELPER: 669 CELLS/UL (ref 430–1800)
CD4 % HELPER T CELL: 53 % (ref 32–64)
LYMPHOCYTE SUBSET PANEL 2 INFO: NORMAL

## 2019-10-15 ENCOUNTER — TELEPHONE (OUTPATIENT)
Dept: INFECTIOUS DISEASES | Age: 59
End: 2019-10-15

## 2019-10-16 ENCOUNTER — TELEPHONE (OUTPATIENT)
Dept: INFECTIOUS DISEASES | Age: 59
End: 2019-10-16

## 2019-10-30 ENCOUNTER — OFFICE VISIT (OUTPATIENT)
Dept: INFECTIOUS DISEASES | Age: 59
End: 2019-10-30
Payer: MEDICARE

## 2019-10-30 VITALS
TEMPERATURE: 97.9 F | RESPIRATION RATE: 16 BRPM | DIASTOLIC BLOOD PRESSURE: 67 MMHG | BODY MASS INDEX: 30.29 KG/M2 | SYSTOLIC BLOOD PRESSURE: 120 MMHG | HEIGHT: 61 IN | WEIGHT: 160.4 LBS | HEART RATE: 70 BPM

## 2019-10-30 DIAGNOSIS — R63.5 WEIGHT GAIN: ICD-10-CM

## 2019-10-30 DIAGNOSIS — Z13.29 THYROID DISORDER SCREENING: ICD-10-CM

## 2019-10-30 DIAGNOSIS — E55.9 VITAMIN D DEFICIENCY: ICD-10-CM

## 2019-10-30 DIAGNOSIS — Z23 INFLUENZA VACCINE ADMINISTERED: ICD-10-CM

## 2019-10-30 DIAGNOSIS — Z21 ASYMPTOMATIC HIV INFECTION (HCC): Primary | ICD-10-CM

## 2019-10-30 PROCEDURE — G8482 FLU IMMUNIZE ORDER/ADMIN: HCPCS | Performed by: INTERNAL MEDICINE

## 2019-10-30 PROCEDURE — 1036F TOBACCO NON-USER: CPT | Performed by: INTERNAL MEDICINE

## 2019-10-30 PROCEDURE — G0008 ADMIN INFLUENZA VIRUS VAC: HCPCS | Performed by: INTERNAL MEDICINE

## 2019-10-30 PROCEDURE — G8428 CUR MEDS NOT DOCUMENT: HCPCS | Performed by: INTERNAL MEDICINE

## 2019-10-30 PROCEDURE — G8417 CALC BMI ABV UP PARAM F/U: HCPCS | Performed by: INTERNAL MEDICINE

## 2019-10-30 PROCEDURE — 90686 IIV4 VACC NO PRSV 0.5 ML IM: CPT | Performed by: INTERNAL MEDICINE

## 2019-10-30 PROCEDURE — 99214 OFFICE O/P EST MOD 30 MIN: CPT | Performed by: INTERNAL MEDICINE

## 2019-10-30 PROCEDURE — 3017F COLORECTAL CA SCREEN DOC REV: CPT | Performed by: INTERNAL MEDICINE

## 2019-11-01 ENCOUNTER — NURSE ONLY (OUTPATIENT)
Dept: INFECTIOUS DISEASES | Age: 59
End: 2019-11-01

## 2019-11-07 RX ORDER — ELVITEGRAVIR, COBICISTAT, EMTRICITABINE, AND TENOFOVIR ALAFENAMIDE 150; 150; 200; 10 MG/1; MG/1; MG/1; MG/1
TABLET ORAL
Qty: 90 TABLET | Refills: 1 | Status: SHIPPED | OUTPATIENT
Start: 2019-11-07 | End: 2020-04-29

## 2019-11-19 ENCOUNTER — TELEPHONE (OUTPATIENT)
Dept: INFECTIOUS DISEASES | Age: 59
End: 2019-11-19

## 2019-11-25 ENCOUNTER — NURSE ONLY (OUTPATIENT)
Dept: INFECTIOUS DISEASES | Age: 59
End: 2019-11-25

## 2019-11-25 DIAGNOSIS — B20 HUMAN IMMUNODEFICIENCY VIRUS (HCC): Primary | ICD-10-CM

## 2019-12-19 ENCOUNTER — NURSE ONLY (OUTPATIENT)
Dept: INFECTIOUS DISEASES | Age: 59
End: 2019-12-19

## 2019-12-19 DIAGNOSIS — B20 HUMAN IMMUNODEFICIENCY VIRUS (HCC): Primary | ICD-10-CM

## 2020-01-09 ENCOUNTER — NURSE ONLY (OUTPATIENT)
Dept: INFECTIOUS DISEASES | Age: 60
End: 2020-01-09

## 2020-02-05 ENCOUNTER — NURSE ONLY (OUTPATIENT)
Dept: INFECTIOUS DISEASES | Age: 60
End: 2020-02-05

## 2020-02-05 DIAGNOSIS — Z21 ASYMPTOMATIC HIV INFECTION (HCC): ICD-10-CM

## 2020-02-05 LAB
ALBUMIN SERPL-MCNC: 4.5 G/DL (ref 3.5–4.6)
ALP BLD-CCNC: 80 U/L (ref 40–130)
ALT SERPL-CCNC: 32 U/L (ref 0–33)
ANION GAP SERPL CALCULATED.3IONS-SCNC: 15 MEQ/L (ref 9–15)
AST SERPL-CCNC: 26 U/L (ref 0–35)
BILIRUB SERPL-MCNC: 0.3 MG/DL (ref 0.2–0.7)
BUN BLDV-MCNC: 17 MG/DL (ref 6–20)
CALCIUM SERPL-MCNC: 9 MG/DL (ref 8.5–9.9)
CHLORIDE BLD-SCNC: 97 MEQ/L (ref 95–107)
CO2: 27 MEQ/L (ref 20–31)
CREAT SERPL-MCNC: 0.8 MG/DL (ref 0.5–0.9)
GFR AFRICAN AMERICAN: >60
GFR NON-AFRICAN AMERICAN: >60
GLOBULIN: 2.7 G/DL (ref 2.3–3.5)
GLUCOSE BLD-MCNC: 114 MG/DL (ref 70–99)
HCT VFR BLD CALC: 43.4 % (ref 37–47)
HEMOGLOBIN: 14.4 G/DL (ref 12–16)
MCH RBC QN AUTO: 31.9 PG (ref 27–31.3)
MCHC RBC AUTO-ENTMCNC: 33.2 % (ref 33–37)
MCV RBC AUTO: 95.9 FL (ref 82–100)
PDW BLD-RTO: 14.5 % (ref 11.5–14.5)
PLATELET # BLD: 187 K/UL (ref 130–400)
POTASSIUM SERPL-SCNC: 3.7 MEQ/L (ref 3.4–4.9)
RBC # BLD: 4.52 M/UL (ref 4.2–5.4)
SODIUM BLD-SCNC: 139 MEQ/L (ref 135–144)
TOTAL PROTEIN: 7.2 G/DL (ref 6.3–8)
WBC # BLD: 4.6 K/UL (ref 4.8–10.8)

## 2020-02-07 LAB
ABSOLUTE CD 4 HELPER: 724 CELLS/UL (ref 430–1800)
CD4 % HELPER T CELL: 52 % (ref 32–64)
HIV-1 QNT LOG, IU/ML: NOT DETECTED LOG CPY/ML
HIV-1 QNT, IU/ML: NOT DETECTED CPY/ML
INTERPRETATION: NOT DETECTED
LYMPHOCYTE SUBSET PANEL 2 INFO: NORMAL

## 2020-02-13 ENCOUNTER — NURSE ONLY (OUTPATIENT)
Dept: INFECTIOUS DISEASES | Age: 60
End: 2020-02-13

## 2020-02-17 ENCOUNTER — TELEPHONE (OUTPATIENT)
Dept: INFECTIOUS DISEASES | Age: 60
End: 2020-02-17

## 2020-02-17 NOTE — TELEPHONE ENCOUNTER
Pt called in wanting to talk about home stressors. provided active listening. States she is having a lot of anxiety. She follows with [facility] and she will be going [facility] as well. Requesting help with teaching her family more of HIV as \"they  me and tell at me\" tearful on phone. Has only a few friends to talk to. Stated she can call this RN with a date that works to schedule teaching for family. She will call office with other complaints. Requested she call [facility] and set up sooner appt. She verbalized all understanding.  She stated she will call with date,       Denies issues that need to be address with Dr Esmer Molina

## 2020-03-04 ENCOUNTER — TELEPHONE (OUTPATIENT)
Dept: INFECTIOUS DISEASES | Age: 60
End: 2020-03-04

## 2020-03-04 NOTE — TELEPHONE ENCOUNTER
Received rejection for Genvoya from pt pharmacy [facility]. As directed went to Covermy meds for PA   Attempted PA. Not able to go through. States that pt needs to call pharmacy with correct insurance information. States it is billing secondary instead of primary. Call placed to pt. She will call pharmacy this morning and call office with update.

## 2020-03-05 ENCOUNTER — NURSE ONLY (OUTPATIENT)
Dept: INFECTIOUS DISEASES | Age: 60
End: 2020-03-05

## 2020-03-05 NOTE — PROGRESS NOTES
problem  THC---denies  Drugs---denies  Diagnosis:   Date of diagnosis:  2018   Transmition:  heterosexual contact   Signs and symptoms:   [x] Diarrhea  [] Night sweats   [] Dizziness  [] Chronic fatigue   [] Vomiting  [] Nausea   [] Hand/feet pain [] Headaches   [] Swollen lymph [] Skin rash   [] Fevers  [] Body aches   [] Weight loss  [] Other -     Opportunistic Infections:  AT THIS TIME---na   [] Thrush -   [] Wasting   [] KS   [] PCP   [] CMV   [] STD -    STD history----na    Pt is aware of u=u and PREP. She is NSA at this time. Has on off partner. Condoms \"sometimes\" discussed need for protection regarding other STIs. Health/Healthcare:     PCP Visit: Dr Juan Manuel Kiser   ID Visit:  Dr Sylwia Almonte Visit:  Adrien Patino coming up 3.27.2020 with Dr Lise Bustamante  Discussed importance of twice a year visits. Encouraged follow up. Last lab work:  As below   CD4:   Lab Results   Component Value Date    LABABSO 724 02/05/2020       Viral Load:  Lab Results   Component Value Date    [id number] <20 05/30/2018    [id number] Not Detected 02/05/2020       Reviewed recent lab work---denies questions      Medical Insurance:  Payor: Wilder Medina / Plan: Eleuterio Beau ESSENTIAL/PLUS / Product Type: *No Product type* /    OHDAP/HIPSCA:     Renewal Date:    No changes      Allergies:  is allergic to amoxicillin; clobetasol; doxycycline monohydrate; fish oil; fluconazole; paroxetine; pcn [penicillins]; and statins. No changes    Current Medications:  Current Outpatient Medications on File Prior to Visit   Medication Sig Dispense Refill    GENVOYA [phone] MG TABLET TAKE ONE TABLET BY MOUTH EVERY DAY 90 tablet 1    hydrocortisone 2.5 % ointment Apply twice daily to eyelid area.       Urea (CARMOL) 40 % cream Apply topically      ibuprofen (ADVIL;MOTRIN) 600 MG tablet Take 600 mg by mouth      loperamide (LOPERAMIDE A-D) 2 MG tablet Take 0.5 tablets by mouth      rosuvastatin (CRESTOR) 5 MG tablet 5 mg every other day       cyanocobalamin 1000 MCG tablet Take 1,000 mcg by mouth      clonazepam (KLONOPIN) 0.5 MG tablet Take 0.25 mg by mouth 2 times daily as needed.  Multiple Vitamin (MULTIVITAMIN PO) Take 1 tablet by mouth daily. No current facility-administered medications on file prior to visit. Reviewed and confirmed with pt. History of HIV Medications or current regimen:   Genvoya  Has many complaints, although she is unsure if all issues are related to [facility]. C/o maryan gain, CP on and off, and \"terrible\" leg cramps---states she was seen by PCP and was told to stop cholesterol med. States now symptoms are better. Wants to hold off on switching medications until labs (by PCP) are done and she follows up with PCP. In the past 30 days she admits to missing 1 day of medications--- reviewed importance of compliance. Pharmacy:      [address], [facility] [address]  [address] [facility]  Phone: [phone] Fax: [phone]    No changes   Legal Issues:    na    Emergency Contact:   Extended Emergency Contact Information  Primary Emergency Contact: Stevenson Ayala [facility] of [address] Phone: [phone]  Relation: Child  Confirmed no changes   Concerns:   Possible change of med? Support System:  Her family is main support system. Denies problems or concerns with support system. RN Comments:  Feeling some better since death of . Has sinus issues today. Next 1x1 4.16.2020  Follows with [facility]  In 2 weeks  [facility] follow up this month. Updates. No changes to insurance. No changes to income. Will provide when eligibility is updated. HIV primary care-- hospital out pt  Housing-- stable  HIV risk counseling--- yes  Mental health-- yes  Substance abuse--- yes    Support group---flyer reviewed and given to pt. She will be able to attend. She will call to confirm. 4.9.2019 was last pap.  She goes annually. Discussed the importance of OBGYN follow up and annual pap tests. Patient states she will follow up with her OBGYN and notify office with update    She is requesting help with basic needs. Requesting food/gas voucher  Needs assessed. 1 food voucher given to pt. Denies any needs at this time. Denies any issues that need Doctor attention.  Will call with concerns

## 2020-03-16 ENCOUNTER — TELEPHONE (OUTPATIENT)
Dept: INFECTIOUS DISEASES | Age: 60
End: 2020-03-16

## 2020-03-16 NOTE — TELEPHONE ENCOUNTER
Pt called in with concerns of corona virus. CDC recommendations reviewed. Pt is concerns about working. 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 Reviewed labs with pt. Labs are stable. Requested to reschedule all appt out to may 2020. Able to reschedule. She Will call with other issues, concerns or questions that would need Dr attention. Denies any concerns at this time that need Doctor attention.

## 2020-03-18 ENCOUNTER — TELEPHONE (OUTPATIENT)
Dept: INFECTIOUS DISEASES | Age: 60
End: 2020-03-18

## 2020-03-20 DIAGNOSIS — R30.9 PAIN WITH URINATION: ICD-10-CM

## 2020-03-20 LAB
BILIRUBIN URINE: NEGATIVE
BLOOD, URINE: NEGATIVE
CLARITY: CLEAR
COLOR: YELLOW
GLUCOSE URINE: NEGATIVE MG/DL
KETONES, URINE: NEGATIVE MG/DL
LEUKOCYTE ESTERASE, URINE: NEGATIVE
NITRITE, URINE: NEGATIVE
PH UA: 6 (ref 5–9)
PROTEIN UA: NEGATIVE MG/DL
SPECIFIC GRAVITY UA: 1.02 (ref 1–1.03)
URINE REFLEX TO CULTURE: NORMAL
UROBILINOGEN, URINE: 0.2 E.U./DL

## 2020-03-24 ENCOUNTER — OFFICE VISIT (OUTPATIENT)
Dept: OBGYN CLINIC | Age: 60
End: 2020-03-24
Payer: MEDICARE

## 2020-03-24 VITALS
DIASTOLIC BLOOD PRESSURE: 86 MMHG | BODY MASS INDEX: 31.72 KG/M2 | HEART RATE: 76 BPM | WEIGHT: 168 LBS | HEIGHT: 61 IN | SYSTOLIC BLOOD PRESSURE: 136 MMHG

## 2020-03-24 PROCEDURE — G8482 FLU IMMUNIZE ORDER/ADMIN: HCPCS | Performed by: OBSTETRICS & GYNECOLOGY

## 2020-03-24 PROCEDURE — 1036F TOBACCO NON-USER: CPT | Performed by: OBSTETRICS & GYNECOLOGY

## 2020-03-24 PROCEDURE — 3017F COLORECTAL CA SCREEN DOC REV: CPT | Performed by: OBSTETRICS & GYNECOLOGY

## 2020-03-24 PROCEDURE — G8428 CUR MEDS NOT DOCUMENT: HCPCS | Performed by: OBSTETRICS & GYNECOLOGY

## 2020-03-24 PROCEDURE — 99213 OFFICE O/P EST LOW 20 MIN: CPT | Performed by: OBSTETRICS & GYNECOLOGY

## 2020-03-24 PROCEDURE — G8417 CALC BMI ABV UP PARAM F/U: HCPCS | Performed by: OBSTETRICS & GYNECOLOGY

## 2020-03-24 RX ORDER — FLUCONAZOLE 150 MG/1
TABLET ORAL
Qty: 3 TABLET | Refills: 0 | Status: SHIPPED | OUTPATIENT
Start: 2020-03-24 | End: 2021-07-13

## 2020-04-01 ENCOUNTER — OFFICE VISIT (OUTPATIENT)
Dept: OBGYN CLINIC | Age: 60
End: 2020-04-01
Payer: MEDICARE

## 2020-04-01 VITALS
DIASTOLIC BLOOD PRESSURE: 70 MMHG | HEART RATE: 76 BPM | WEIGHT: 166 LBS | HEIGHT: 61 IN | SYSTOLIC BLOOD PRESSURE: 134 MMHG | BODY MASS INDEX: 31.34 KG/M2

## 2020-04-01 PROCEDURE — 1036F TOBACCO NON-USER: CPT | Performed by: OBSTETRICS & GYNECOLOGY

## 2020-04-01 PROCEDURE — 3017F COLORECTAL CA SCREEN DOC REV: CPT | Performed by: OBSTETRICS & GYNECOLOGY

## 2020-04-01 PROCEDURE — G8417 CALC BMI ABV UP PARAM F/U: HCPCS | Performed by: OBSTETRICS & GYNECOLOGY

## 2020-04-01 PROCEDURE — G8427 DOCREV CUR MEDS BY ELIG CLIN: HCPCS | Performed by: OBSTETRICS & GYNECOLOGY

## 2020-04-01 PROCEDURE — 99214 OFFICE O/P EST MOD 30 MIN: CPT | Performed by: OBSTETRICS & GYNECOLOGY

## 2020-04-01 RX ORDER — VALACYCLOVIR HYDROCHLORIDE 500 MG/1
500 TABLET, FILM COATED ORAL DAILY
Qty: 60 TABLET | Refills: 2 | Status: SHIPPED | OUTPATIENT
Start: 2020-04-01 | End: 2022-02-23 | Stop reason: ALTCHOICE

## 2020-04-01 RX ORDER — VALACYCLOVIR HYDROCHLORIDE 1 G/1
1000 TABLET, FILM COATED ORAL 2 TIMES DAILY
Qty: 20 TABLET | Refills: 0 | Status: SHIPPED | OUTPATIENT
Start: 2020-04-01 | End: 2020-04-11

## 2020-04-01 NOTE — PROGRESS NOTES
Results Review      Geno Rodriguez is a 61y.o. year old female here to discuss genital culture results. PREVIOUS VISIT: vaginal irritation, genital burning. Vitals:  /70 (Site: Right Upper Arm, Position: Sitting, Cuff Size: Medium Adult)   Pulse 76   Ht 5' 1\" (1.549 m)   Wt 166 lb (75.3 kg)   LMP 2012   BMI 31.37 kg/m²   Allergies:  Amoxicillin; Clobetasol; Doxycycline monohydrate;  Fish oil; Fluconazole; Paroxetine; Pcn [penicillins]; and Statins  Past Medical History:   Diagnosis Date    Anemia     Ankle fracture, right     in the past    Anxiety     Benign breast disease     right    Bipolar disorder (HCC)     Carpal tunnel syndrome     Cervicalgia     and cervical disc herniation (C4-C5 level) leading to right arm pain    Chronic cough     COPD (chronic obstructive pulmonary disease) ([facility])     at small airway level (PFT's with negative methacholine challenge test:  2009)    Elevated BP     Fracture of left clavicle     as a child    HIV (human immunodeficiency virus infection) ([facility] [address] 2017    Hyperlipidemia     Irritable bowel syndrome     Menorrhagia     Migraine     Panic attacks     PCOS (polycystic ovarian syndrome)     Right shoulder pain      Past Surgical History:   Procedure Laterality Date    CARPAL TUNNEL RELEASE      right    HEMORRHOID SURGERY      WRIST SURGERY      left     OB History        3    Para   3    Term                AB        Living           SAB        TAB        Ectopic        Molar        Multiple        Live Births                     Family History   Problem Relation Age of Onset    Cancer Mother         breast    Heart Disease Brother 48    Diabetes Brother     Cancer Maternal Aunt         breast    Hypertension Father     Liver Disease Father     Cancer Maternal Aunt         skin    Stroke Sister      Social History     Socioeconomic History    Marital status:      Spouse name: Not on file    Number

## 2020-04-02 NOTE — PROGRESS NOTES
exam : labia erythematous, multiple sores noted. Consistent with possible herpetic vulvar lesions . + abnormal discharge. Assessment:      Diagnosis Orders   1. Vaginal pain  HSV 1/2, DNA PCR    HSV 1/2, DNA PCR   2. Vaginal yeast infection         Plan:     Cultures for possible HSV sent   Treatment for possible concomitant yeast infection given   Patient to follow up in 1 week   Counseled briefly about possible HSV infection. Orders Placed This Encounter   Procedures    HSV 1/2, DNA PCR     Standing Status:   Future     Number of Occurrences:   1     Standing Expiration Date:   3/24/2021     Orders Placed This Encounter   Medications    fluconazole (DIFLUCAN) 150 MG tablet     Sig: Take 1 tablet every 2 days for 3 doses. Dispense:  3 tablet     Refill:  0    terconazole (TERAZOL 7) 0.4 % vaginal cream     Sig: Place vaginally nightly. Dispense:  1 Tube     Refill:  0     Patient was seen with total face to face time of 15 minutes. More than 50% of this visit was counseling and education regarding The primary encounter diagnosis was Vaginal pain. A diagnosis of Vaginal yeast infection was also pertinent to this visit. and Vaginal Pain (And swelling)   as well as  counseling on preventative health maintenance follow-up. Follow Up:  Return in about 1 week (around 3/31/2020) for F/U for results, medication assessment.         Emile Holbrook MD

## 2020-04-06 ENCOUNTER — TELEPHONE (OUTPATIENT)
Dept: OBGYN CLINIC | Age: 60
End: 2020-04-06

## 2020-04-06 NOTE — TELEPHONE ENCOUNTER
Suze Lynn called this afternoon complaining of joint pain, headache, and pelvic pain after taking Valtrex daily this weekend. She's wondering if there's another medication or a cream that can be prescribed instead?

## 2020-04-07 ENCOUNTER — TELEPHONE (OUTPATIENT)
Dept: INFECTIOUS DISEASES | Age: 60
End: 2020-04-07

## 2020-04-07 NOTE — TELEPHONE ENCOUNTER
That's what she tried taking over the weekend. She's wondering if she could only take the Valtrex 1-2 times a week.

## 2020-04-08 ENCOUNTER — OFFICE VISIT (OUTPATIENT)
Dept: OBGYN CLINIC | Age: 60
End: 2020-04-08
Payer: MEDICARE

## 2020-04-08 VITALS
DIASTOLIC BLOOD PRESSURE: 84 MMHG | WEIGHT: 168 LBS | BODY MASS INDEX: 31.72 KG/M2 | SYSTOLIC BLOOD PRESSURE: 126 MMHG | HEIGHT: 61 IN | HEART RATE: 84 BPM

## 2020-04-08 PROCEDURE — G8417 CALC BMI ABV UP PARAM F/U: HCPCS | Performed by: OBSTETRICS & GYNECOLOGY

## 2020-04-08 PROCEDURE — 99213 OFFICE O/P EST LOW 20 MIN: CPT | Performed by: OBSTETRICS & GYNECOLOGY

## 2020-04-08 PROCEDURE — 1036F TOBACCO NON-USER: CPT | Performed by: OBSTETRICS & GYNECOLOGY

## 2020-04-08 PROCEDURE — 3017F COLORECTAL CA SCREEN DOC REV: CPT | Performed by: OBSTETRICS & GYNECOLOGY

## 2020-04-08 PROCEDURE — G8427 DOCREV CUR MEDS BY ELIG CLIN: HCPCS | Performed by: OBSTETRICS & GYNECOLOGY

## 2020-04-08 RX ORDER — POLYETHYLENE GLYCOL 3350 17 G/17G
17 POWDER, FOR SOLUTION ORAL 2 TIMES DAILY PRN
Qty: 1020 G | Refills: 1 | Status: SHIPPED | OUTPATIENT
Start: 2020-04-08 | End: 2020-05-08

## 2020-04-13 ENCOUNTER — TELEPHONE (OUTPATIENT)
Dept: INFECTIOUS DISEASES | Age: 60
End: 2020-04-13

## 2020-04-13 NOTE — TELEPHONE ENCOUNTER
Pt wanted to talk regarding stressors related to Herpes DX and other related concerns. provdied active listening and support. She will follow up with PCP for back pain complaints.

## 2020-04-14 ENCOUNTER — NURSE ONLY (OUTPATIENT)
Dept: INFECTIOUS DISEASES | Age: 60
End: 2020-04-14

## 2020-04-29 RX ORDER — ELVITEGRAVIR, COBICISTAT, EMTRICITABINE, AND TENOFOVIR ALAFENAMIDE 150; 150; 200; 10 MG/1; MG/1; MG/1; MG/1
TABLET ORAL
Qty: 90 TABLET | Refills: 1 | Status: SHIPPED | OUTPATIENT
Start: 2020-04-29 | End: 2020-04-30

## 2020-04-30 RX ORDER — ELVITEGRAVIR, COBICISTAT, EMTRICITABINE, AND TENOFOVIR ALAFENAMIDE 150; 150; 200; 10 MG/1; MG/1; MG/1; MG/1
TABLET ORAL
Qty: 90 TABLET | Refills: 1 | Status: SHIPPED | OUTPATIENT
Start: 2020-04-30 | End: 2020-11-12

## 2020-05-01 ENCOUNTER — TELEPHONE (OUTPATIENT)
Dept: INFECTIOUS DISEASES | Age: 60
End: 2020-05-01

## 2020-05-01 NOTE — TELEPHONE ENCOUNTER
Pt called in wanting to talk. Missed 2 days of medications. Reviewed compliance. Has 2 months extra. Believes insurance is filling to early. Adamant that it is not compliance, it is insurance. Discussed that she needs to continue to take medications as directed. She can call insurance if further concerns. Provided active listening as pt wanted to talk about life concerns. Goes to [employer]. F/u with PCP 5.28.2020     Pt also noted that she is sexually active with \"friend\" . She is concerns about STD, just in case. She is not sure if he is honest with her about everything. Ok for STD testing and routine labs per Dr Foley Code. Orders placed and she will go to lab today or monday    Will call with other complaints or issues and updates.

## 2020-05-07 DIAGNOSIS — Z21 ASYMPTOMATIC HIV INFECTION (HCC): ICD-10-CM

## 2020-05-07 DIAGNOSIS — E78.5 HYPERLIPIDEMIA, UNSPECIFIED HYPERLIPIDEMIA TYPE: ICD-10-CM

## 2020-05-07 LAB
ALBUMIN SERPL-MCNC: 4.1 G/DL (ref 3.5–4.6)
ALP BLD-CCNC: 81 U/L (ref 40–130)
ALT SERPL-CCNC: 34 U/L (ref 0–33)
ANION GAP SERPL CALCULATED.3IONS-SCNC: 12 MEQ/L (ref 9–15)
AST SERPL-CCNC: 23 U/L (ref 0–35)
BILIRUB SERPL-MCNC: <0.2 MG/DL (ref 0.2–0.7)
BUN BLDV-MCNC: 24 MG/DL (ref 6–20)
CALCIUM SERPL-MCNC: 8.8 MG/DL (ref 8.5–9.9)
CHLORIDE BLD-SCNC: 96 MEQ/L (ref 95–107)
CO2: 28 MEQ/L (ref 20–31)
CREAT SERPL-MCNC: 0.65 MG/DL (ref 0.5–0.9)
GFR AFRICAN AMERICAN: >60
GFR NON-AFRICAN AMERICAN: >60
GLOBULIN: 2.9 G/DL (ref 2.3–3.5)
GLUCOSE BLD-MCNC: 89 MG/DL (ref 70–99)
HCT VFR BLD CALC: 42.5 % (ref 37–47)
HEMOGLOBIN: 14.2 G/DL (ref 12–16)
HEPATITIS C ANTIBODY INTERPRETATION: NORMAL
MCH RBC QN AUTO: 31.7 PG (ref 27–31.3)
MCHC RBC AUTO-ENTMCNC: 33.4 % (ref 33–37)
MCV RBC AUTO: 95.1 FL (ref 82–100)
PDW BLD-RTO: 14 % (ref 11.5–14.5)
PLATELET # BLD: 200 K/UL (ref 130–400)
POTASSIUM SERPL-SCNC: 4 MEQ/L (ref 3.4–4.9)
RBC # BLD: 4.47 M/UL (ref 4.2–5.4)
SODIUM BLD-SCNC: 136 MEQ/L (ref 135–144)
TOTAL PROTEIN: 7 G/DL (ref 6.3–8)
WBC # BLD: 5.8 K/UL (ref 4.8–10.8)

## 2020-05-08 LAB
ABSOLUTE CD 4 HELPER: 896 CELLS/UL (ref 430–1800)
CD4 % HELPER T CELL: 52 % (ref 32–64)
LYMPHOCYTE SUBSET PANEL 2 INFO: NORMAL
RPR: NORMAL

## 2020-05-10 LAB
QUANTI TB GOLD PLUS: NEGATIVE
QUANTI TB1 MINUS NIL: 0 IU/ML (ref 0–0.34)
QUANTI TB2 MINUS NIL: 0 IU/ML (ref 0–0.34)
QUANTIFERON MITOGEN: 6.79 IU/ML
QUANTIFERON NIL: 0.01 IU/ML

## 2020-05-12 LAB
HIV-1 QNT LOG, IU/ML: <1.47 LOG CPY/ML
HIV-1 QNT, IU/ML: ABNORMAL CPY/ML
INTERPRETATION: DETECTED

## 2020-05-14 DIAGNOSIS — E78.2 HYPERLIPIDEMIA, MIXED: ICD-10-CM

## 2020-05-14 LAB
C. TRACHOMATIS DNA ,URINE: NEGATIVE
CHOLESTEROL, TOTAL: 222 MG/DL (ref 0–199)
HDLC SERPL-MCNC: 47 MG/DL (ref 40–59)
LDL CHOLESTEROL CALCULATED: 132 MG/DL (ref 0–129)
N. GONORRHOEAE DNA, URINE: NEGATIVE
TRIGL SERPL-MCNC: 213 MG/DL (ref 0–150)

## 2020-05-19 ENCOUNTER — TELEPHONE (OUTPATIENT)
Dept: INFECTIOUS DISEASES | Age: 60
End: 2020-05-19

## 2020-05-19 NOTE — TELEPHONE ENCOUNTER
Pt called in for scheduled 1x1. Doing ok. Has apt with PCP video visit only 5.26.2020. she is frustrated states she wants apt in person. Reviewed all labs with pt. Denies questions regarding results other than lipid panel. Has been modifing diet. No exercise. Encouraged increased activity. She will ask daughter to use her treadmill. Or walk around park. Has been taking BP twice daily  5.18.2020--- 123/77 2nd /77  Today --- 102/53 2nd /64     Worried about STD. Recent oral sex. Reviewed labs again. Pt wants mouth swab due to oral sex. Ok per Dr Healy Median. Provided active listening as pt discussed multiple life stressors due to [place]. She did follow up with [facility].

## 2020-06-16 ENCOUNTER — TELEPHONE (OUTPATIENT)
Dept: INFECTIOUS DISEASES | Age: 60
End: 2020-06-16

## 2020-06-16 NOTE — TELEPHONE ENCOUNTER
Spoke with pt due to changes in clinic due to COVID 19. Discussed changes in doctors temporarily. Explained that all 3 doctors are partners. Patient was informed that our practice is making every effort to adhere to current recommendations, including social distancing. For the health and safety of our patients, and to prevent unnecessary exposure, we are currently scheduling video appointments. Patient was informed that these appointments are official appointments and will be billed through patient's insurance. Patient confirms this and agreed to the video visit. she did verbalize understanding of clinic changes due to COVID 19. Reviewed if patient has smart phone and or computer --- prefers [phone]  Explained that ID office will call  piror to apt time and send Doxy link for apt. She verbalized understanding. provided active listening as pt discussed weight frustrations and how to better herself. provided encouragement and socialization.

## 2020-06-24 ENCOUNTER — VIRTUAL VISIT (OUTPATIENT)
Dept: INFECTIOUS DISEASES | Age: 60
End: 2020-06-24
Payer: MEDICARE

## 2020-06-24 PROCEDURE — 99214 OFFICE O/P EST MOD 30 MIN: CPT | Performed by: INTERNAL MEDICINE

## 2020-06-24 NOTE — PROGRESS NOTES
2020    TELEHEALTH EVALUATION -- Audio/Visual (During SZIFF-45 public health emergency)      Isaak Mcclendon (:  1960) has requested an audio/video evaluation for the following concern(s):    HIV    Due to the COVID-19 outbreak, patient's office visit was converted to a virtual visit. Patient was contacted and agreed to proceed with a virtual visit with me via Doxy. me with my location at the office. Patient reports that they are located at home. The risks and benefits of converting to a virtual visit were discussed in light of the current infectious disease epidemic. Services were provided through an audio/video synchronous discussion virtually to substitute for in person clinic visit. THIS virtual visit was conducted via interactive/real-time audio/video. Due to this being a TeleHealth encounter, evaluation of the following organ systems is limited: Vitals/Constitutional/EENT/Resp/CV/GI//MS/Neuro/Skin/Heme-Lymph-Imm.}    Pursuant to the emergency declaration under the [facility], [facility] waiver authority and the [employer] Act, this Virtual Visit was conducted, with patient's consent, to reduce the patient's risk of exposure to COVID-19 and provide care for the patient. Patient Name: Isaak Mcclendon  YOB: 1960  Patient Sex: female  Medical Record Number: [id number]    Background:  Diagnosed 2017  Symptoms started with UTI. WBCs have been low for years - possibility older infection   passed away. Has had other sexual partners - per patient all negative. No THC and no smoking. Periodic alcohol  No travel out of [place]. Cat at home. No TB history  Has had trichomonas in the past. Possible BV. +anal warts history    Complaints:   R leg pain that goes to lower back/ feels tight in leg when sleeping  Pain is better when walks around   Mild to moderate.  No numbness or tingling  Has shoulder pain        Past Surgical History  Past Surgical History:   Procedure Laterality Date    CARPAL TUNNEL RELEASE      right    HEMORRHOID SURGERY      WRIST SURGERY      left       Allergies  Allergies   Allergen Reactions    Amoxicillin Hives    Clobetasol Other (See Comments)     Dry mouth.  Doxycycline Monohydrate Diarrhea    Fish Oil Itching    Fluconazole Other (See Comments)     Pt. Felt very different on this medication, dizzy,     Paroxetine Other (See Comments)     SSRI's with various complaints - headaches, \"Funny feelings\"    Pcn [Penicillins] Hives    Statins Other (See Comments)     Muscles aches  Muscles aches   PCN allergy - years ago. Unknown allergic reaction    Family History  Family History   Problem Relation Age of Onset    Cancer Mother         breast    Heart Disease Brother 48    Diabetes Brother     Cancer Maternal Aunt         breast    Hypertension Father     Liver Disease Father     Cancer Maternal Aunt         skin    Stroke Sister        Immunization History  Immunization History   Administered Date(s) Administered    Hepatitis A/Hepatitis B (Twinrix) 11/21/2018, 01/16/2019, 06/19/2019    Influenza Virus Vaccine 10/09/2017, 11/15/2018    Influenza, Quadv, 6 mo and older, IM (Fluzone, Flulaval) 11/15/2018    Influenza, Quadv, IM, PF (6 mo and older Fluzone, Flulaval, Fluarix, and 3 yrs and older Afluria) 10/30/2019    Pneumococcal Conjugate 13-valent ([id number]) 10/09/2017    Pneumococcal Polysaccharide ([id number]) 11/21/2018    Tdap (Boostrix, Adacel) 03/04/2014     Physical Exam    LMP 05/05/2012     Since we cannot conduct an in-person exam, the following were addressed with the patient. I have asked the patient to assist in their exam:  Patient denies any general new issues. Patient does not observe any new skin rashes or ulcers.    Patient does not perceive any new visual deficits  Patient does not feel like they are \"clammy\" or

## 2020-06-26 ENCOUNTER — TELEPHONE (OUTPATIENT)
Dept: INFECTIOUS DISEASES | Age: 60
End: 2020-06-26

## 2020-07-31 ENCOUNTER — TELEPHONE (OUTPATIENT)
Dept: INFECTIOUS DISEASES | Age: 60
End: 2020-07-31

## 2020-07-31 NOTE — TELEPHONE ENCOUNTER
Pt called in wanting to talk. provided active listening and support as pt discussed multiple life stressors. States she has been feeling down lately. Stills f/u [facility]. Following up with PCP as directed. C/o achy ness a lot. Otherwise doing ok. Has been doing well with [place].      Denies needs and will call with update

## 2020-08-13 ENCOUNTER — TELEPHONE (OUTPATIENT)
Dept: INFECTIOUS DISEASES | Age: 60
End: 2020-08-13

## 2020-08-17 DIAGNOSIS — Z13.29 THYROID DISORDER SCREENING: ICD-10-CM

## 2020-08-17 DIAGNOSIS — R63.5 WEIGHT GAIN: ICD-10-CM

## 2020-08-17 DIAGNOSIS — Z21 ASYMPTOMATIC HIV INFECTION (HCC): ICD-10-CM

## 2020-08-17 DIAGNOSIS — E55.9 VITAMIN D DEFICIENCY: ICD-10-CM

## 2020-08-17 DIAGNOSIS — B20 HIV INFECTION, UNSPECIFIED SYMPTOM STATUS (HCC): ICD-10-CM

## 2020-08-17 DIAGNOSIS — M79.606 PAIN OF LOWER EXTREMITY, UNSPECIFIED LATERALITY: ICD-10-CM

## 2020-08-17 LAB
ALBUMIN SERPL-MCNC: 4.3 G/DL (ref 3.5–4.6)
ALP BLD-CCNC: 71 U/L (ref 40–130)
ALT SERPL-CCNC: 35 U/L (ref 0–33)
ANION GAP SERPL CALCULATED.3IONS-SCNC: 11 MEQ/L (ref 9–15)
AST SERPL-CCNC: 24 U/L (ref 0–35)
BILIRUB SERPL-MCNC: 0.3 MG/DL (ref 0.2–0.7)
BUN BLDV-MCNC: 16 MG/DL (ref 6–20)
C-REACTIVE PROTEIN, HIGH SENSITIVITY: 5.9 MG/L (ref 0–5)
CALCIUM SERPL-MCNC: 8.8 MG/DL (ref 8.5–9.9)
CHLORIDE BLD-SCNC: 104 MEQ/L (ref 95–107)
CO2: 28 MEQ/L (ref 20–31)
CREAT SERPL-MCNC: 0.75 MG/DL (ref 0.5–0.9)
GFR AFRICAN AMERICAN: >60
GFR NON-AFRICAN AMERICAN: >60
GLOBULIN: 2.6 G/DL (ref 2.3–3.5)
GLUCOSE BLD-MCNC: 117 MG/DL (ref 70–99)
HCT VFR BLD CALC: 42.3 % (ref 37–47)
HEMOGLOBIN: 14.4 G/DL (ref 12–16)
MCH RBC QN AUTO: 31.8 PG (ref 27–31.3)
MCHC RBC AUTO-ENTMCNC: 34 % (ref 33–37)
MCV RBC AUTO: 93.5 FL (ref 82–100)
PDW BLD-RTO: 14.1 % (ref 11.5–14.5)
PLATELET # BLD: 182 K/UL (ref 130–400)
POTASSIUM SERPL-SCNC: 4.4 MEQ/L (ref 3.4–4.9)
RBC # BLD: 4.53 M/UL (ref 4.2–5.4)
SODIUM BLD-SCNC: 143 MEQ/L (ref 135–144)
TOTAL CK: 145 U/L (ref 0–170)
TOTAL PROTEIN: 6.9 G/DL (ref 6.3–8)
TSH REFLEX: 2.25 UIU/ML (ref 0.44–3.86)
VITAMIN D 25-HYDROXY: 20.6 NG/ML (ref 30–100)
WBC # BLD: 4.9 K/UL (ref 4.8–10.8)

## 2020-08-19 LAB
ABSOLUTE CD 4 HELPER: 852 CELLS/UL (ref 430–1800)
CD4 % HELPER T CELL: 53 % (ref 32–64)
LYMPHOCYTE SUBSET PANEL 2 INFO: NORMAL

## 2020-08-20 ENCOUNTER — TELEPHONE (OUTPATIENT)
Dept: INFECTIOUS DISEASES | Age: 60
End: 2020-08-20

## 2020-08-21 LAB
HIV-1 QNT LOG, IU/ML: <1.47 LOG CPY/ML
HIV-1 QNT, IU/ML: ABNORMAL CPY/ML
INTERPRETATION: DETECTED

## 2020-08-26 ENCOUNTER — TELEPHONE (OUTPATIENT)
Dept: INFECTIOUS DISEASES | Age: 60
End: 2020-08-26

## 2020-08-26 NOTE — TELEPHONE ENCOUNTER
Pt presented for Support Group meeting. Pt actively participated in discussion on HIV and Coronavirus. Overview of [facility] provided by members of [facility].

## 2020-09-15 ENCOUNTER — TELEPHONE (OUTPATIENT)
Dept: INFECTIOUS DISEASES | Age: 60
End: 2020-09-15

## 2020-09-15 NOTE — TELEPHONE ENCOUNTER
Pt called in wanting to review labs again. Also pt is c/o numbness and tingling of BLLE. This is intermittent. Follows up with PCP for joint pain and RA    Spoke with Dr Vita Aguayo regarding numbness and tingling --- per Dr refer to neurology. Order placed. Reviewed neurology referral with pt. All Dr Arian Medina office reviewed with pt. Denies questions and verbalized understanding of reason for referral.     provided active listening as pt discuss family issues. Discussed upcoming HIV support group. Pt is interested in coming. Denies need for transportation help. Directions, time and place reviewed with pt as well. Pt will call if she is unable to make it to group/       Reviewed with pt that it is time to update RW eligibility and paperwork. Reviewed all needed documentation for update. This includes proof of income ( 1 month total, most recent) proof of residency and ID.   pt is aware of the program, verbalized understanding and denies questions. Denies any changes with in the past 6 months. CM apt made 9.30.2020 at 10 am    Was able to reschedule pt apt from 10.14.2020 to 10.13.2020 due to doctor schedule changes. Reviewed need for labs 1 week prior to apt. She will go to [facility].     She is also requesting help with basic needs. Requesting food/gas voucher    Pt into [facility]  Needs assessed. 2 gas cards and 2 COVID food vouchers given to pt. Denies other needs at this time and will call with concerns and/or updates  Provided face mask surgical x 4, hand  and cleaning wipes, RW Part A Provision, to reduce the spread of Covid-19    Denies questions. Denies any needs at this time. Denies any issues that need Doctor attention.  Will call with concerns      Most recent weight 172 lbs

## 2020-09-26 ENCOUNTER — APPOINTMENT (OUTPATIENT)
Dept: GENERAL RADIOLOGY | Age: 60
End: 2020-09-26
Payer: MEDICARE

## 2020-09-26 ENCOUNTER — HOSPITAL ENCOUNTER (EMERGENCY)
Age: 60
Discharge: HOME | End: 2020-09-26
Payer: MEDICARE

## 2020-09-26 VITALS
SYSTOLIC BLOOD PRESSURE: 186 MMHG | BODY MASS INDEX: 32.28 KG/M2 | RESPIRATION RATE: 17 BRPM | WEIGHT: 171 LBS | OXYGEN SATURATION: 98 % | HEART RATE: 87 BPM | HEIGHT: 61 IN | DIASTOLIC BLOOD PRESSURE: 95 MMHG | TEMPERATURE: 98 F

## 2020-09-26 PROCEDURE — 6370000000 HC RX 637 (ALT 250 FOR IP): Performed by: STUDENT IN AN ORGANIZED HEALTH CARE EDUCATION/TRAINING PROGRAM

## 2020-09-26 PROCEDURE — 73130 X-RAY EXAM OF HAND: CPT

## 2020-09-26 PROCEDURE — 6360000002 HC RX W HCPCS: Performed by: STUDENT IN AN ORGANIZED HEALTH CARE EDUCATION/TRAINING PROGRAM

## 2020-09-26 PROCEDURE — 96372 THER/PROPH/DIAG INJ SC/IM: CPT

## 2020-09-26 PROCEDURE — 99284 EMERGENCY DEPT VISIT MOD MDM: CPT

## 2020-09-26 PROCEDURE — 99285 EMERGENCY DEPT VISIT HI MDM: CPT

## 2020-09-26 PROCEDURE — 29125 APPL SHORT ARM SPLINT STATIC: CPT

## 2020-09-26 PROCEDURE — 73110 X-RAY EXAM OF WRIST: CPT

## 2020-09-26 RX ORDER — TRAMADOL HYDROCHLORIDE 50 MG/1
50 TABLET ORAL EVERY 4 HOURS PRN
Qty: 18 TABLET | Refills: 0 | Status: SHIPPED | OUTPATIENT
Start: 2020-09-26 | End: 2020-09-29

## 2020-09-26 RX ORDER — KETOROLAC TROMETHAMINE 30 MG/ML
30 INJECTION, SOLUTION INTRAMUSCULAR; INTRAVENOUS ONCE
Status: COMPLETED | OUTPATIENT
Start: 2020-09-26 | End: 2020-09-26

## 2020-09-26 RX ORDER — ACETAMINOPHEN 500 MG
1000 TABLET ORAL ONCE
Status: COMPLETED | OUTPATIENT
Start: 2020-09-26 | End: 2020-09-26

## 2020-09-26 RX ADMIN — ACETAMINOPHEN 1000 MG: 500 TABLET ORAL at 20:53

## 2020-09-26 RX ADMIN — KETOROLAC TROMETHAMINE 30 MG: 30 INJECTION, SOLUTION INTRAMUSCULAR; INTRAVENOUS at 20:53

## 2020-09-26 ASSESSMENT — PAIN SCALES - GENERAL
PAINLEVEL_OUTOF10: 7
PAINLEVEL_OUTOF10: 8
PAINLEVEL_OUTOF10: 8

## 2020-09-26 ASSESSMENT — PAIN DESCRIPTION - ORIENTATION: ORIENTATION: RIGHT

## 2020-09-26 ASSESSMENT — PAIN DESCRIPTION - DESCRIPTORS: DESCRIPTORS: SHARP

## 2020-09-26 ASSESSMENT — PAIN DESCRIPTION - PROGRESSION: CLINICAL_PROGRESSION: NOT CHANGED

## 2020-09-26 ASSESSMENT — PAIN DESCRIPTION - FREQUENCY: FREQUENCY: CONTINUOUS

## 2020-09-26 ASSESSMENT — PAIN DESCRIPTION - LOCATION: LOCATION: HAND

## 2020-09-26 ASSESSMENT — PAIN DESCRIPTION - PAIN TYPE: TYPE: ACUTE PAIN

## 2020-09-27 NOTE — ED TRIAGE NOTES
Patient states that she tripped over a case of water and landed on wood pily. Patient complaining right wrist injury. Abrasion and bruise also noted to right knee.

## 2020-09-28 ENCOUNTER — TELEPHONE (OUTPATIENT)
Dept: INFECTIOUS DISEASES | Age: 60
End: 2020-09-28

## 2020-09-28 ASSESSMENT — ENCOUNTER SYMPTOMS
COUGH: 0
WHEEZING: 0
PHOTOPHOBIA: 0
ABDOMINAL PAIN: 0
VOMITING: 0
NAUSEA: 0
SHORTNESS OF BREATH: 0

## 2020-09-28 NOTE — TELEPHONE ENCOUNTER
Pt called in wanting to update. States she fell this weekend. Slipped over case of h20. Went to ER and now has Right wrist fracture. Has partial cast on it now. Provided active listening as pt discussed fall. Tolerating pain at this time. Is not able to to come to group tomorrow as she has ortho follow up.      Otherwise will call office with updates

## 2020-09-29 NOTE — ED PROVIDER NOTES
[address] [facility] ED  EMERGENCY DEPARTMENT ENCOUNTER      Pt Name: Sophia Hatfield  MRN: [id number]  [place] 1960  Date of evaluation: 9/26/2020  Provider: Vicente Pierre Dr       Chief Complaint   Patient presents with    Wrist Injury     Right wrist after fall         HISTORY OF PRESENT ILLNESS   (Location/Symptom, Timing/Onset, Context/Setting, Quality, Duration, Modifying Factors, Severity)  Note limiting factors. Sophia Hatfield is a 61 y.o. female who per chart review has pmhx of ibs, copd, pcos, bipolar disorder, HIV, anemia, panic attacks presents to the emergency department with sudden onset, constant, moderate, R wrist pain that occurred pta. She was walking in her living room when she tripped over a case of water and landed on the wrist. States pain is worse with movement and palpation. Has not tried anything for sx. Did not hit head, no LOC. No numbness tingling weakness. HPI    Nursing Notes were reviewed. REVIEW OF SYSTEMS    (2-9 systems for level 4, 10 or more for level 5)     Review of Systems   Constitutional: Negative for chills and fever. HENT: Negative for congestion. Eyes: Negative for photophobia. Respiratory: Negative for cough, shortness of breath and wheezing. Cardiovascular: Negative for chest pain and palpitations. Gastrointestinal: Negative for abdominal pain, nausea and vomiting. Genitourinary: Negative for dysuria, frequency and hematuria. Musculoskeletal: Positive for arthralgias. Negative for myalgias. Allergic/Immunologic: Negative for immunocompromised state. Neurological: Negative for dizziness, weakness and headaches. All other systems reviewed and are negative. Except as noted above the remainder of the review of systems was reviewed and negative.        PAST MEDICAL HISTORY     Past Medical History:   Diagnosis Date    Anemia     Ankle fracture, right     in the past    Anxiety     Benign breast disease right    Bipolar disorder ([facility]     Carpal tunnel syndrome     Cervicalgia     and cervical disc herniation (C4-C5 level) leading to right arm pain    Chronic cough     COPD (chronic obstructive pulmonary disease) (HCC)     at small airway level (PFT's with negative methacholine challenge test:  01/2009)    Elevated BP     Fracture of left clavicle     as a child    HIV (human immunodeficiency virus infection) ([facility] [address] 09/2017    Hyperlipidemia     Irritable bowel syndrome     Menorrhagia     Migraine     Panic attacks     PCOS (polycystic ovarian syndrome)     Right shoulder pain          SURGICAL HISTORY       Past Surgical History:   Procedure Laterality Date    CARPAL TUNNEL RELEASE      right    HEMORRHOID SURGERY      WRIST SURGERY      left         CURRENT MEDICATIONS       Discharge Medication List as of 9/26/2020  9:52 PM      CONTINUE these medications which have NOT CHANGED    Details   GENVOYA [phone] MG TABLET TAKE ONE TABLET BY MOUTH EVERY DAY, Disp-90 tablet, R-1Normal      valACYclovir (VALTREX) 500 MG tablet Take 1 tablet by mouth daily, Disp-60 tablet, R-2Normal      fluconazole (DIFLUCAN) 150 MG tablet Take 1 tablet every 2 days for 3 doses. , Disp-3 tablet, R-0Normal      hydrocortisone 2.5 % ointment Apply twice daily to eyelid area., Historical Med      Urea (CARMOL) 40 % cream Apply topically, Topical, Starting Thu 10/18/2018, Historical Med      ibuprofen (ADVIL;MOTRIN) 600 MG tablet Take 600 mg by mouthHistorical Med      loperamide (LOPERAMIDE A-D) 2 MG tablet Take 0.5 tablets by mouthHistorical Med      rosuvastatin (CRESTOR) 5 MG tablet 5 mg every other day Historical Med      cyanocobalamin 1000 MCG tablet Take 1,000 mcg by mouth      clonazepam (KLONOPIN) 0.5 MG tablet Take 0.25 mg by mouth 2 times daily as needed. Historical Med      Multiple Vitamin (MULTIVITAMIN PO) Take 1 tablet by mouth daily. ALLERGIES     Amoxicillin; Clobetasol;  Doxycycline monohydrate; Fish oil; Fluconazole; Paroxetine; Pcn [penicillins]; and Statins    FAMILY HISTORY       Family History   Problem Relation Age of Onset    Cancer Mother         breast    Heart Disease Brother 48    Diabetes Brother     Cancer Maternal Aunt         breast    Hypertension Father     Liver Disease Father     Cancer Maternal Aunt         skin    Stroke Sister           SOCIAL HISTORY       Social History     Socioeconomic History    Marital status:      Spouse name: Not on file    Number of children: Not on file    Years of education: Not on file    Highest education level: Not on file   Occupational History    Not on file   Social Needs    Financial resource strain: Not on file    Food insecurity     Worry: Not on file     Inability: Not on file    Transportation needs     Medical: Not on file     Non-medical: Not on file   Tobacco Use    Smoking status: Never Smoker    Smokeless tobacco: Never Used   Substance and Sexual Activity    Alcohol use:  Yes     Alcohol/week: 0.0 standard drinks     Comment: occasional    Drug use: No    Sexual activity: Not Currently   Lifestyle    Physical activity     Days per week: Not on file     Minutes per session: Not on file    Stress: Not on file   Relationships    Social connections     Talks on phone: Not on file     Gets together: Not on file     Attends Cheondoism service: Not on file     Active member of club or organization: Not on file     Attends meetings of clubs or organizations: Not on file     Relationship status: Not on file    Intimate partner violence     Fear of current or ex partner: Not on file     Emotionally abused: Not on file     Physically abused: Not on file     Forced sexual activity: Not on file   Other Topics Concern    Not on file   Social History Narrative    Not on file       SCREENINGS        Parag Coma Scale  Eye Opening: Spontaneous  Best Verbal Response: Oriented  Best Motor Response: Obeys commands  Parag Coma Scale Score: 15               PHYSICAL EXAM    (up to 7 for level 4, 8 or more for level 5)     ED Triage Vitals [09/26/20 2034]   BP Temp Temp Source Pulse Resp SpO2 Height Weight   (!) 178/91 98 °F (36.7 °C) Oral 89 18 97 % 5' 1\" (1.549 m) 171 lb (77.6 kg)       Physical Exam  Constitutional:       General: She is not in acute distress. Appearance: She is well-developed. She is not toxic-appearing. HENT:      Head: Normocephalic and atraumatic. Nose: Nose normal.      Mouth/Throat:      Mouth: Mucous membranes are moist.   Eyes:      Pupils: Pupils are equal, round, and reactive to light. Neck:      Musculoskeletal: Normal range of motion. Cardiovascular:      Rate and Rhythm: Normal rate and regular rhythm. Heart sounds: No murmur. No friction rub. No gallop. Pulmonary:      Effort: Pulmonary effort is normal.      Breath sounds: Normal breath sounds. No wheezing, rhonchi or rales. Abdominal:      General: Bowel sounds are normal. There is no distension. Palpations: Abdomen is soft. Tenderness: There is no abdominal tenderness. Musculoskeletal:         General: No swelling. Right wrist: She exhibits decreased range of motion and tenderness. She exhibits no bony tenderness, no swelling, no effusion, no crepitus, no deformity and no laceration. Arms:    Skin:     General: Skin is warm and dry. Capillary Refill: Capillary refill takes less than 2 seconds. Neurological:      Mental Status: She is alert and oriented to person, place, and time.          DIAGNOSTIC RESULTS     EKG: All EKG's are interpreted by the Emergency Department Physician who either signs or Co-signs this chart in the absence of a cardiologist.        RADIOLOGY:   Non-plain film images such as CT, Ultrasound and MRI are read by the radiologist. Plain radiographic images are visualized and preliminarily interpreted by the emergency physician with the below findings:      Interpretation per the Radiologist below, if available at the time of this note:    XR HAND RIGHT (MIN 3 VIEWS)   Final Result      NO ACUTE FRACTURE OR SIGNIFICANT CHANGE FROM 2/11/2013 IDENTIFIED. XR WRIST RIGHT (MIN 3 VIEWS)   Final Result      NO ACUTE FRACTURE OR SIGNIFICANT CHANGE FROM 2/11/2013 IDENTIFIED. ED BEDSIDE ULTRASOUND:   Performed by ED Physician - none    LABS:  Labs Reviewed - No data to display    All other labs were within normal range or not returned as of this dictation. EMERGENCY DEPARTMENT COURSE and DIFFERENTIAL DIAGNOSIS/MDM:   Vitals:    Vitals:    09/26/20 2034 09/26/20 2057 09/26/20 2138   BP: (!) 178/91 (!) 180/82 (!) 186/95   Pulse: 89 77 87   Resp: 18 16 17   Temp: 98 °F (36.7 °C)     TempSrc: Oral     SpO2: 97% 97% 98%   Weight: 171 lb (77.6 kg)     Height: 5' 1\" (1.549 m)         MDM     Pt is a 62 yo F who presents to the ED with R wrist pain s/p fall. She is afebrile and hemodynamically stable. She was given po tylenol and im toradol in the ED. Xray of the R hand and wrist read as negative however suspect a distal ulnar fracture. Pt was placed in ulnar gutter splint and given ortho f/u for Monday. Pt given script for tramadol. Return to the ED for worsening sx. Pt understands and agrees to plan, all questions answered. REASSESSMENT          CRITICAL CARE TIME   Total Critical Care time was 0 minutes, excluding separately reportable procedures. There was a high probability of clinically significant/life threatening deterioration in the patient's condition which required my urgent intervention. CONSULTS:  None    PROCEDURES:  Unless otherwise noted below, none     Procedures        FINAL IMPRESSION      1.  Injury of right wrist, initial encounter          DISPOSITION/PLAN   DISPOSITION Decision To Discharge 09/26/2020 10:05:44 PM      PATIENT REFERRED TO:  Angela Kang MD  [address] [facility] [place] [address]  [phone]    Schedule an appointment as soon as possible for a visit   As needed, If symptoms worsen    [facility]) ED  [address] [id number] [phone]  Go to   As needed, If symptoms worsen      DISCHARGE MEDICATIONS:  Discharge Medication List as of 9/26/2020  9:52 PM      START taking these medications    Details   traMADol (ULTRAM) 50 MG tablet Take 1 tablet by mouth every 4 hours as needed for Pain for up to 3 days. Intended supply: 3 days. Take lowest dose possible to manage pain, Disp-18 tablet,R-0Print           Controlled Substances Monitoring:     No flowsheet data found.     (Please note that portions of this note were completed with a voice recognition program.  Efforts were made to edit the dictations but occasionally words are mis-transcribed.)    Mellisa Gillespie PA-C (electronically signed)           Mellisa Gillespie PA-C  09/28/20 0066

## 2020-09-30 ENCOUNTER — NURSE ONLY (OUTPATIENT)
Dept: INFECTIOUS DISEASES | Age: 60
End: 2020-09-30

## 2020-09-30 NOTE — PROGRESS NOTES
Date    [id number] <20 05/30/2018    [id number] <1.47 08/17/2020       Medical Insurance:  Payor: Nicole Martinez / Plan: Olga Pallas / Product Type: *No Product type* /    OHDAP/HIPSCA:  NA   Renewal Date:  NA    Income:    SSI; Spouse's pension    Legal Issues:    NA    Emergency Contact:   Extended Emergency Contact Information  Primary Emergency Contact: Chayito Soliz States of [address] Phone: [phone]  Relation: Child  Appointment completed via phone per Covid-19 Guidelines. CM called pt for scheduled appointment. CM completed Semi-Annual Review. No changes noted to RW Part A Eligibility for services. CM completed PSA/SA/MH Assessments. Pt has no hx of SA, has hx of MH. CM reviewed Grievance Policy and Sliding Scale Fee with pt. Pt receives [facility] from [facility] every 3 months. CM updated ISP, pt agreed with POC. Pt is compliant with HIV Care, last VL from 8/20 is <30. Pt has not seen dentist since Covid-19 Pandemic. Pt is sexually active, understands the information regarding U=U, undetectable equals un-transmittable. CM provided socialization. Pt discussed multiple issues, pt recently fell and injured hand. CM provided active listening and support. Pt also discussed interest in meeting others with HIV. Pt has been attending Support Group sporadically. Pt also discussed inability to trace transmission of her HIV. Pt seemed in disbelief about diagnosis. Pt has been positive since 2017. CM provided support and encouragement. CM will follow up with pt as needed.

## 2020-10-05 DIAGNOSIS — B20 HUMAN IMMUNODEFICIENCY VIRUS (HCC): ICD-10-CM

## 2020-10-05 LAB
ALBUMIN SERPL-MCNC: 4.3 G/DL (ref 3.5–4.6)
ALP BLD-CCNC: 73 U/L (ref 40–130)
ALT SERPL-CCNC: 35 U/L (ref 0–33)
ANION GAP SERPL CALCULATED.3IONS-SCNC: 12 MEQ/L (ref 9–15)
AST SERPL-CCNC: 25 U/L (ref 0–35)
BILIRUB SERPL-MCNC: <0.2 MG/DL (ref 0.2–0.7)
BUN BLDV-MCNC: 20 MG/DL (ref 6–20)
CALCIUM SERPL-MCNC: 9.1 MG/DL (ref 8.5–9.9)
CHLORIDE BLD-SCNC: 97 MEQ/L (ref 95–107)
CO2: 29 MEQ/L (ref 20–31)
CREAT SERPL-MCNC: 0.9 MG/DL (ref 0.5–0.9)
GFR AFRICAN AMERICAN: >60
GFR NON-AFRICAN AMERICAN: >60
GLOBULIN: 2.7 G/DL (ref 2.3–3.5)
GLUCOSE BLD-MCNC: 80 MG/DL (ref 70–99)
HCT VFR BLD CALC: 41.1 % (ref 37–47)
HEMOGLOBIN: 13.5 G/DL (ref 12–16)
MCH RBC QN AUTO: 30.9 PG (ref 27–31.3)
MCHC RBC AUTO-ENTMCNC: 32.8 % (ref 33–37)
MCV RBC AUTO: 94.2 FL (ref 82–100)
PDW BLD-RTO: 14.1 % (ref 11.5–14.5)
PLATELET # BLD: 201 K/UL (ref 130–400)
POTASSIUM SERPL-SCNC: 4.2 MEQ/L (ref 3.4–4.9)
RBC # BLD: 4.37 M/UL (ref 4.2–5.4)
SODIUM BLD-SCNC: 138 MEQ/L (ref 135–144)
TOTAL PROTEIN: 7 G/DL (ref 6.3–8)
WBC # BLD: 6.8 K/UL (ref 4.8–10.8)

## 2020-10-07 LAB
ABSOLUTE CD 4 HELPER: 1003 CELLS/UL (ref 430–1800)
CD4 % HELPER T CELL: 51 % (ref 32–64)
LYMPHOCYTE SUBSET PANEL 2 INFO: NORMAL

## 2020-10-14 ENCOUNTER — TELEPHONE (OUTPATIENT)
Dept: INFECTIOUS DISEASES | Age: 60
End: 2020-10-14

## 2020-10-14 NOTE — TELEPHONE ENCOUNTER
Pt called in requesting to come to office for flu vaccine. Apt was able to be scheduled 10.16.2020  Reminder about apts- discussed upcoming V/V apt with Dr Jeimy Garrett [phone]  Denies issues with medications. 100% compliant.    Denies any issues or concerns  Will call office if needed

## 2020-10-15 ENCOUNTER — VIRTUAL VISIT (OUTPATIENT)
Dept: INFECTIOUS DISEASES | Age: 60
End: 2020-10-15
Payer: MEDICARE

## 2020-10-15 PROCEDURE — 99443 PR PHYS/QHP TELEPHONE EVALUATION 21-30 MIN: CPT | Performed by: INTERNAL MEDICINE

## 2020-10-15 NOTE — PROGRESS NOTES
10/15/2020    TELEHEALTH EVALUATION -- Audio/Visual (During SHE-37 public health emergency)      Savanna Peña (:  1960) has requested an audio/video evaluation for the following concern(s):    HIV    Due to the COVID-19 outbreak, patient's office visit was converted to a virtual visit. Patient was contacted and agreed to proceed with a virtual visit with me via Doxy. me with my location at the office. Patient reports that they are located at home. The risks and benefits of converting to a virtual visit were discussed in light of the current infectious disease epidemic. Services were provided through an audio/video synchronous discussion virtually to substitute for in person clinic visit. THIS virtual visit was conducted via interactive/real-time audio/video. Due to this being a TeleHealth encounter, evaluation of the following organ systems is limited: Vitals/Constitutional/EENT/Resp/CV/GI//MS/Neuro/Skin/Heme-Lymph-Imm.}    Pursuant to the emergency declaration under the [facility], [facility] waiver authority and the [employer] Act, this Virtual Visit was conducted, with patient's consent, to reduce the patient's risk of exposure to COVID-19 and provide care for the patient. Patient Name: Savanna Peña  YOB: 1960  Patient Sex: female  Medical Record Number: [id number]    Background:  Diagnosed 2017  Symptoms started with UTI  WBCs have been low for years - possibility older infection   passed away. Has had other sexual partners - per patient all negative. No THC and no smoking. Periodic alcohol  No travel out of [place]. Hx of having a cat at home. No TB history  Sexual hx: Had trichomonas in the past. Possible BV. +anal warts history  Has chronic urinary incontinence as discussed previously  PCN allergy - years ago.  Unknown allergic reaction    Recently, slipped on case of water 2 weeks ago - bruised knee, L hand bruised. Going to therapy for this. No other new issues. States that she is having frequent urination - no burning or fevers. Physical Exam  Since we cannot conduct an in-person exam, the following were addressed with the patient to the best of my capability via virtual visit:   Patient does not perceive any new visual deficits  No diaphoresis or flushing in the face  Patient is able to flex and extend her neck with ease. Patient can inhale and exhale without any difficulty and chest seems to be expanding symmetrically. No conversational dyspnea. Patient does not feel any palpitations   Patient's  abdomen is not protruberant beyond their normal size. No new swelling of their joints. No observed neurological changes or slurred speech when speaking to the patient    No new skin rash or ulcers    She is able to move around without difficulty. Chemistry        Component Value Date/Time     10/05/2020 1512    K 4.2 10/05/2020 1512    CL 97 10/05/2020 1512    CO2 29 10/05/2020 1512    BUN 20 10/05/2020 1512    CREATININE 0.90 10/05/2020 1512        Component Value Date/Time    CALCIUM 9.1 10/05/2020 1512    ALKPHOS 73 10/05/2020 1512    AST 25 10/05/2020 1512    ALT 35 (H) 10/05/2020 1512    BILITOT <0.2 10/05/2020 1512          No components found for: CHLPL  Lab Results   Component Value Date    TRIG 213 (H) 05/14/2020    TRIG 175 (H) 06/05/2019    TRIG 131 12/05/2018     Lab Results   Component Value Date    HDL 47 05/14/2020    HDL 50 06/05/2019    HDL 51 12/05/2018     Lab Results   Component Value Date    LDLCALC 132 (H) 05/14/2020    LDLCALC 144 (H) 06/05/2019    LDLCALC 118 12/05/2018       Impression/Plan  HIV infection - stable  Labs reviewed with patient   Hx of Herpes - no current outbreak  Back pain - monitor, chronic  Diarrhea - chronic still 4-5 times per day  Hx HPV hx.  Followed by gyn  Vitamin D def hx - recheck with next labs  hx of anal warts/HPV hx    Frequent urination, check UA and UC. If continues and UA negative, refer to urology. She is asking for \"something to help with the incontinence\"     Just had primary care visit     Follow up: 3 months    TELEPHONE ONLY VISIT  Time spent with patient: 25 min  Patient was counseled on the importance of proper handwashing, especially after handling wounds, surfaces, urine, and stool. If any diarrhea develops, patient is to refrain from the use of anti-diarrhea medications and is to call me immediately. Patient should refrain from touching their face. Questions regarding the current COVID outbreak addressed as needed.        [address] [facility]

## 2020-10-16 ENCOUNTER — TELEPHONE (OUTPATIENT)
Dept: INFECTIOUS DISEASES | Age: 60
End: 2020-10-16

## 2020-10-16 NOTE — TELEPHONE ENCOUNTER
Pt called in requesting to reschedule her flu vac apt. States she is having a lot of work done at her home and is not able to make it. rescheduled her for 10.19.2020. she denies issues with this date and time.

## 2020-10-19 ENCOUNTER — NURSE ONLY (OUTPATIENT)
Dept: INFECTIOUS DISEASES | Age: 60
End: 2020-10-19
Payer: MEDICARE

## 2020-10-19 DIAGNOSIS — B20 HIV INFECTION, UNSPECIFIED SYMPTOM STATUS (HCC): ICD-10-CM

## 2020-10-19 LAB
BACTERIA: NEGATIVE /HPF
BILIRUBIN URINE: NEGATIVE
BLOOD, URINE: NEGATIVE
CLARITY: CLEAR
COLOR: YELLOW
EPITHELIAL CELLS, UA: NORMAL /HPF (ref 0–5)
GLUCOSE URINE: NEGATIVE MG/DL
HYALINE CASTS: NORMAL /HPF (ref 0–5)
KETONES, URINE: NEGATIVE MG/DL
LEUKOCYTE ESTERASE, URINE: ABNORMAL
NITRITE, URINE: NEGATIVE
PH UA: 6.5 (ref 5–9)
PROTEIN UA: NEGATIVE MG/DL
RBC UA: NORMAL /HPF (ref 0–5)
SPECIFIC GRAVITY UA: 1.01 (ref 1–1.03)
URINE REFLEX TO CULTURE: ABNORMAL
UROBILINOGEN, URINE: 0.2 E.U./DL
WBC UA: NORMAL /HPF (ref 0–5)

## 2020-10-19 PROCEDURE — 90686 IIV4 VACC NO PRSV 0.5 ML IM: CPT | Performed by: INTERNAL MEDICINE

## 2020-10-19 PROCEDURE — G0008 ADMIN INFLUENZA VIRUS VAC: HCPCS | Performed by: INTERNAL MEDICINE

## 2020-10-19 NOTE — PROGRESS NOTES
Pt presented pt office for scheduled apt. Here today for Flu vaccine. Doing ok. No issues. No issues with medications. Vaccine Information Sheet, \"Influenza - Inactivated\"  given to Geena Kemp, or parent/legal guardian of  Geena Kemp and verbalized understanding. Patient responses:    Have you ever had a reaction to a flu vaccine? No  Are you able to eat eggs without adverse effects? Yes  Do you have any current illness? No  Have you ever had Guillian [place] Syndrome? No    Flu vaccine given per order. Please see immunization tab. After obtaining consent, and per orders of Dr. Archana Valentin, injection of  0.5  m.l   FLU VACCINE  Was administered to left deltoid. Manf:  Tracy Sanderson  Lot #: [id number] EXP: June 30, 2021  [facility]: [id number]    Patient tolerated well. Denies pain at site. Patient instructed to remain in clinic for 5-10 minutes afterwards, and to report any adverse reaction to me immediately. UA completed and sent    Provided  hand  and cleaning wipes, RW Part A Provision, to reduce the spread of Covid-19. Also provided was cleaning spray from family planning. requesting help with basic needs. Requesting food/gas voucher  Needs assessed. 2 food and 2 gas cards given to pt. Denies other needs at this time and will call with concerns and/or updates      Denies any needs at this time. Denies any issues that need Doctor attention.  Will call with concerns

## 2020-10-30 ENCOUNTER — TELEPHONE (OUTPATIENT)
Dept: INFECTIOUS DISEASES | Age: 60
End: 2020-10-30

## 2020-11-12 RX ORDER — ELVITEGRAVIR, COBICISTAT, EMTRICITABINE, AND TENOFOVIR ALAFENAMIDE 150; 150; 200; 10 MG/1; MG/1; MG/1; MG/1
TABLET ORAL
Qty: 90 TABLET | Refills: 1 | Status: SHIPPED | OUTPATIENT
Start: 2020-11-12 | End: 2020-11-16

## 2020-11-16 RX ORDER — ELVITEGRAVIR, COBICISTAT, EMTRICITABINE, AND TENOFOVIR ALAFENAMIDE 150; 150; 200; 10 MG/1; MG/1; MG/1; MG/1
TABLET ORAL
Qty: 90 TABLET | Refills: 1 | Status: SHIPPED | OUTPATIENT
Start: 2020-11-16 | End: 2021-05-14

## 2020-11-20 ENCOUNTER — TELEPHONE (OUTPATIENT)
Dept: INFECTIOUS DISEASES | Age: 60
End: 2020-11-20

## 2020-11-20 NOTE — TELEPHONE ENCOUNTER
Pt called in wanting to talk. provided active listening as she discussed stressors and family issues. Will call office if needed.

## 2020-12-22 ENCOUNTER — TELEPHONE (OUTPATIENT)
Dept: INFECTIOUS DISEASES | Age: 60
End: 2020-12-22

## 2020-12-22 NOTE — TELEPHONE ENCOUNTER
Pt called into clinic requesting help with basic needs. Requesting food/gas voucher    Pt into [facility]  Needs assessed. 2 food and 1 gas card given to pt. Denies other needs at this time and will call with concerns and/or updates      Denies any needs at this time. Denies any issues that need Doctor attention.  Will call with concerns    COVID vouchers-- NO

## 2021-01-08 ENCOUNTER — TELEPHONE (OUTPATIENT)
Dept: INFECTIOUS DISEASES | Age: 61
End: 2021-01-08

## 2021-01-08 NOTE — TELEPHONE ENCOUNTER
Pt called into clinic requesting help with basic needs. Requesting food/gas voucher    Pt into [facility]  Needs assessed. 1 food and 1 gas card given to pt. PPE provided and discussed with pt. Provided face mask ----  yes  Cloth masks--  0  Surgical masks--- 10  hand  -- 3  cleaning wipes -- 1  RW Part A Provision, to reduce the spread of Covid-19.      COVID food vouchers --- NO    Pt denies any other issues or concerns that need doctor attention and will call office if needed

## 2021-01-08 NOTE — TELEPHONE ENCOUNTER
PA request received from [facility]. Completed. Marty Hale rejected stated billing wrong insurance. Spoke with Sundeep Robbins at [facility]. States they had billed it wrong and problem was resolved. Stated to disregard PA request.     Updated pt. Provided active listening as pt discussed relationship issues. Will complete labs 1.18.21. Labs are for PCP. She also has u/s stomach area. (PCP ordered) requested she call with update. Requested she call office with any concerns or issues.

## 2021-02-05 ENCOUNTER — TELEPHONE (OUTPATIENT)
Dept: INFECTIOUS DISEASES | Age: 61
End: 2021-02-05

## 2021-02-05 NOTE — TELEPHONE ENCOUNTER
Pt called in with updates,     Recently followed up with PCP. Lab work done. She reviewed cholesterol is high. Discussed diet changes. She also states she had ultrasound and was told she has fatty liver and gall bladder issue. worsening arthritis. provided active listening as pt discussed health issues. Worried about weight gain. We have discussed that Dr Phan Segal did offer her to change Genvoya to [address] if she wanted to try new medication. She is still not wanting to change at this time. Will update Dr Phan Segal regarding testing. Requested she call office if needed.

## 2021-02-12 DIAGNOSIS — E78.2 HYPERLIPIDEMIA, MIXED: ICD-10-CM

## 2021-02-12 DIAGNOSIS — E55.9 VITAMIN D DEFICIENCY: Primary | ICD-10-CM

## 2021-02-12 DIAGNOSIS — B20 HIV INFECTION, UNSPECIFIED SYMPTOM STATUS (HCC): ICD-10-CM

## 2021-03-04 ENCOUNTER — TELEPHONE (OUTPATIENT)
Dept: INFECTIOUS DISEASES | Age: 61
End: 2021-03-04

## 2021-03-04 NOTE — TELEPHONE ENCOUNTER
Call placed to pt to discuss up coming support group. Date 3.23.21    Discussed upcoming HIV support group. Pt is interested in coming. Denies need for transportation help. Directions, time and place reviewed with pt as well. Pt will call to confirm or if unable to attend. Denies questions and will call with concerns. Denies any needs at this time. Denies any issues that need Doctor attention.  Will call with concerns

## 2021-03-11 ENCOUNTER — TELEPHONE (OUTPATIENT)
Dept: INFECTIOUS DISEASES | Age: 61
End: 2021-03-11

## 2021-03-11 NOTE — TELEPHONE ENCOUNTER
Pt called in to notify that she is unable to come to support group at this time. If anything changes she will call. She has to babysit. Pt requested 1x1 to talk. Apt scheduled for 3.22.21 this date works with pt babysitting schedule.      Denies other concerns or issues that need Dr attention

## 2021-03-15 ENCOUNTER — TELEPHONE (OUTPATIENT)
Dept: INFECTIOUS DISEASES | Age: 61
End: 2021-03-15

## 2021-03-15 NOTE — TELEPHONE ENCOUNTER
Pt presented as walk in today. She is in need of help with reading and filling out insurance paperwork. provided assistance and socialization. Pt over all is doing well. Denies issues with medications. Follows with [facility] still. Follows with PCP as directed next apt is in July. requesting help with basic needs. Requesting food/gas voucher  Needs assessed. 1 COVID food voucher and 1 gas card given to pt. PPE provided and discussed with pt. Provided face mask ----  yes  Cloth masks--  0  Surgical masks--- 10  hand  -- 2  cleaning wipes -- 1  Toilet paper -- 1  RW Part A Provision, to reduce the spread of Covid-19.      COVID food vouchers --- YES x 1    Pt denies any other issues or concerns that need doctor attention and will call office if needed

## 2021-03-22 ENCOUNTER — NURSE ONLY (OUTPATIENT)
Dept: INFECTIOUS DISEASES | Age: 61
End: 2021-03-22

## 2021-03-22 VITALS
TEMPERATURE: 98.6 F | DIASTOLIC BLOOD PRESSURE: 71 MMHG | HEART RATE: 73 BPM | SYSTOLIC BLOOD PRESSURE: 131 MMHG | HEIGHT: 61 IN | WEIGHT: 177 LBS | RESPIRATION RATE: 16 BRPM | BODY MASS INDEX: 33.42 KG/M2

## 2021-03-22 DIAGNOSIS — B20 HUMAN IMMUNODEFICIENCY VIRUS (HCC): Primary | ICD-10-CM

## 2021-03-22 ASSESSMENT — PATIENT HEALTH QUESTIONNAIRE - PHQ9
SUM OF ALL RESPONSES TO PHQ9 QUESTIONS 1 & 2: 2
1. LITTLE INTEREST OR PLEASURE IN DOING THINGS: 1
2. FEELING DOWN, DEPRESSED OR HOPELESS: 1
SUM OF ALL RESPONSES TO PHQ QUESTIONS 1-9: 2

## 2021-03-22 NOTE — PROGRESS NOTES
RN Intake      Patient ID:   Apolinar Em  Date:   3/22/2021      Client ID:    :    Residency:   [place] [address] [phone] (home)   [x] OK to leave voicemail    Family/House:    [] Partner  [] Children  [x] Other - alone    Housing:   apartment    Transport:  car  Discussed help with transport only as last resort. -- denies needs. Employment:  919.00 SSI                                           [] Employed               [x] Not employed     Occupation:  Last date worked:     Type:      Looking for work:     Self:      Retired:     Place of employment (most recent):      Disabled/Terminated:        [address]: Follows with [facility] regularly and PCP if needed  Discussed referral if needed. Substance Abuse:   Social History     Socioeconomic History    Marital status:      Spouse name: Not on file    Number of children: Not on file    Years of education: Not on file    Highest education level: Not on file   Occupational History    Not on file   Social Needs    Financial resource strain: Not on file    Food insecurity     Worry: Not on file     Inability: Not on file    Transportation needs     Medical: Not on file     Non-medical: Not on file   Tobacco Use    Smoking status: Never Smoker    Smokeless tobacco: Never Used   Substance and Sexual Activity    Alcohol use:  Yes     Alcohol/week: 0.0 standard drinks     Comment: occasional    Drug use: No    Sexual activity: Not Currently   Lifestyle    Physical activity     Days per week: Not on file     Minutes per session: Not on file    Stress: Not on file   Relationships    Social connections     Talks on phone: Not on file     Gets together: Not on file     Attends Temple service: Not on file     Active member of club or organization: Not on file     Attends meetings of clubs or organizations: Not on file     Relationship status: Not on file    Intimate partner violence     Fear of current or ex partner: Not on file     Emotionally abused: Not on file     Physically abused: Not on file     Forced sexual activity: Not on file   Other Topics Concern    Not on file   Social History Narrative    Not on file     No smoking  No etoh  No drug use  No current partner at this time. Diagnosis:   Date of diagnosis:  2017   Transmition:  heterosexual contact   Signs and symptoms:   [x] Diarrhea  [] Night sweats   [] Dizziness  [] Chronic fatigue   [] Vomiting  [] Nausea   [] Hand/feet pain [] Headaches   [] Swollen lymph [] Skin rash   [] Fevers  [] Body aches   [] Weight loss  [] Other -     Opportunistic Infections:  AT THIS TIME---NA   [] Thrush -   [] Wasting   [] KS   [] PCP   [] CMV   [] STD -      Reviewed-- U=U, undetectable equals un-transmittable. explained the unlikelihood of HIV transmission if a pt has been undetectable (viral load). Safe sex practices are still encouraged to prevent transmission of other STI's, such as, chlamydia, herpes, GC. When she is sexually active she does use protection. Health/Healthcare:     PCP Visit:  Dr Chilo Xie   ID Visit:  Dr Zayra Leonardo Visit:  \"twice a year, novemberish\"   Discussed importance of twice a year visits. Encouraged follow up. Last lab work:  As below. CD4:   Lab Results   Component Value Date    LABABSO 1003 10/05/2020       Viral Load:  Lab Results   Component Value Date    RAL2OKRPXSL <20 05/30/2018    [id number] Not Detected 10/05/2020     Education discussed about CD4 and VL and the importance of these labs. Medical Insurance:  Payor: Stacie Gaitan / Plan: [address] / Product Type: *No Product type* /    OHDAP/HIPSCA:     Renewal Date:    Pt states no changes to current insurance. Discussed and educated pt on Leiria if needed. Discussed Leiria funds are to be used as last resort and is not insurance.  We also reviewed that [address] [place] [address] will not cover hospitalizations or other doctor apts out side of [address] [facility] ID. Discussed Emeli Rogers sliding fee scan. Eligibility date: 3.22.2021-9.22.2021  All Emeli Rogers paperwork reviewed and discussed. Pt aware that RW funds are only to be used as last resort. JUANITA and consents reviewed and signed. Transport, Grievance and sliding fee scale reviewed. Pt denied any questions regarding program.     Allergies:  is allergic to amoxicillin; clobetasol; doxycycline monohydrate; fish oil; fluconazole; paroxetine; pcn [penicillins]; and statins. No changes    Current Medications:  Current Outpatient Medications on File Prior to Visit   Medication Sig Dispense Refill    GENVOYA [phone] MG TABLET TAKE ONE TABLET BY MOUTH EVERY DAY 90 tablet 1    valACYclovir (VALTREX) 500 MG tablet Take 1 tablet by mouth daily 60 tablet 2    fluconazole (DIFLUCAN) 150 MG tablet Take 1 tablet every 2 days for 3 doses. 3 tablet 0    hydrocortisone 2.5 % ointment Apply twice daily to eyelid area.  Urea (CARMOL) 40 % cream Apply topically      ibuprofen (ADVIL;MOTRIN) 600 MG tablet Take 600 mg by mouth      loperamide (LOPERAMIDE A-D) 2 MG tablet Take 0.5 tablets by mouth      rosuvastatin (CRESTOR) 5 MG tablet 5 mg every other day       cyanocobalamin 1000 MCG tablet Take 1,000 mcg by mouth      clonazepam (KLONOPIN) 0.5 MG tablet Take 0.25 mg by mouth 2 times daily as needed.  Multiple Vitamin (MULTIVITAMIN PO) Take 1 tablet by mouth daily. No current facility-administered medications on file prior to visit. Reviewed and confirmed with pt.    History of HIV Medications or current regimen:   genvoya-- 100% takes at [address]:      [address], [address]  [address] [facility]  Phone: [phone] Fax: [phone]    No changes  Emergency Contact:   Extended Emergency Contact Information  Primary Emergency Contact: Jessica Robbins [address] Phone: [phone]  Relation: Child  Confirmed no changes    DISCLOSED-- yes    Concerns:     Mental Health--- no  Drug Abuse--no  Housing--no  Support--no  Income--no  Compliance--no      Support System:  [x] Support group        [] Spouse       [x] Family         [x] Friends       [] Clubs  [] Spiritual practices   [] Baptism        [] Clergy         [] [employer]   [] Therapist      [] Hobbies                  [] Other -   Denies problems or concerns with support system. RN Comments:  Discussed compliance -- no issues. We discussed need for exercise and diet changes again. Due to the weather she states \"I haven't been doing much exercise or eating that great\"   Discussed using her indoor bike 3 x weekly. Will be attending support group tomorrow. Reviewed all upcoming apts       Discussed the importance of OBGYN follow up and annual pap tests. Patient states she will follow up with her OBGYN and notify office with update      Denies any needs at this time. Denies any issues that need Doctor attention.  Will call with concerns

## 2021-03-30 ENCOUNTER — TELEPHONE (OUTPATIENT)
Dept: INFECTIOUS DISEASES | Age: 61
End: 2021-03-30

## 2021-03-30 NOTE — TELEPHONE ENCOUNTER
Call placed to pt due to need for rescheduling. 4.14.21 Dr Rogerio Gillespie is out of office. Was able to reschedule 4.27.21. she will complete labs next week. Denies issues or concerns and will call office if needed.

## 2021-04-12 ENCOUNTER — TELEPHONE (OUTPATIENT)
Dept: INFECTIOUS DISEASES | Age: 61
End: 2021-04-12

## 2021-04-12 NOTE — TELEPHONE ENCOUNTER
Call placed to pt to discuss up coming support group. Date 4.22.21    Discussed upcoming HIV support group. Pt is not interested at this time. Will think about it and update staff if interested in further groups. Pt is interested in coming. Pt will call back later today. She is unsure about attendance right now. Not feeling well. States she has a stomach ache and sinus pressure. States tolerable at this time. Denies need for doctor attention. She will call office with update later today or tomorrow. She will hold off on blood work today and try to go tomorrow. Denies any other issues that need Doctor attention.  Will call with concerns

## 2021-04-15 DIAGNOSIS — B20 HIV INFECTION, UNSPECIFIED SYMPTOM STATUS (HCC): ICD-10-CM

## 2021-04-15 DIAGNOSIS — E55.9 VITAMIN D DEFICIENCY: ICD-10-CM

## 2021-04-15 LAB
ALBUMIN SERPL-MCNC: 4.2 G/DL (ref 3.5–4.6)
ALP BLD-CCNC: 85 U/L (ref 40–130)
ALT SERPL-CCNC: 35 U/L (ref 0–33)
ANION GAP SERPL CALCULATED.3IONS-SCNC: 12 MEQ/L (ref 9–15)
AST SERPL-CCNC: 29 U/L (ref 0–35)
BILIRUB SERPL-MCNC: <0.2 MG/DL (ref 0.2–0.7)
BUN BLDV-MCNC: 15 MG/DL (ref 8–23)
CALCIUM SERPL-MCNC: 9.1 MG/DL (ref 8.5–9.9)
CHLORIDE BLD-SCNC: 102 MEQ/L (ref 95–107)
CO2: 27 MEQ/L (ref 20–31)
CREAT SERPL-MCNC: 0.65 MG/DL (ref 0.5–0.9)
GFR AFRICAN AMERICAN: >60
GFR NON-AFRICAN AMERICAN: >60
GLOBULIN: 2.5 G/DL (ref 2.3–3.5)
GLUCOSE BLD-MCNC: 101 MG/DL (ref 70–99)
HCT VFR BLD CALC: 41 % (ref 37–47)
HEMOGLOBIN: 14 G/DL (ref 12–16)
HEPATITIS C ANTIBODY INTERPRETATION: NORMAL
MCH RBC QN AUTO: 31.6 PG (ref 27–31.3)
MCHC RBC AUTO-ENTMCNC: 34.1 % (ref 33–37)
MCV RBC AUTO: 92.6 FL (ref 82–100)
PDW BLD-RTO: 13.9 % (ref 11.5–14.5)
PLATELET # BLD: 202 K/UL (ref 130–400)
POTASSIUM SERPL-SCNC: 3.9 MEQ/L (ref 3.4–4.9)
RBC # BLD: 4.43 M/UL (ref 4.2–5.4)
SODIUM BLD-SCNC: 141 MEQ/L (ref 135–144)
TOTAL PROTEIN: 6.7 G/DL (ref 6.3–8)
VITAMIN D 25-HYDROXY: 20.9 NG/ML (ref 30–100)
WBC # BLD: 6.1 K/UL (ref 4.8–10.8)

## 2021-04-16 LAB
ABSOLUTE CD 4 HELPER: 1047 CELLS/UL (ref 430–1800)
CD4 % HELPER T CELL: 54 % (ref 32–64)
LYMPHOCYTE SUBSET PANEL 2 INFO: NORMAL
RPR: NORMAL

## 2021-04-18 LAB
HIV-1 QNT LOG, IU/ML: <1.47 LOG CPY/ML
HIV-1 QNT, IU/ML: ABNORMAL CPY/ML
INTERPRETATION: DETECTED
QUANTI TB GOLD PLUS: NEGATIVE
QUANTI TB1 MINUS NIL: 0 IU/ML (ref 0–0.34)
QUANTI TB2 MINUS NIL: 0 IU/ML (ref 0–0.34)
QUANTIFERON MITOGEN: 7.96 IU/ML
QUANTIFERON NIL: 0.02 IU/ML

## 2021-04-19 DIAGNOSIS — E78.2 HYPERLIPIDEMIA, MIXED: ICD-10-CM

## 2021-04-19 DIAGNOSIS — B20 HIV INFECTION, UNSPECIFIED SYMPTOM STATUS (HCC): ICD-10-CM

## 2021-04-19 LAB
CHOLESTEROL, TOTAL: 196 MG/DL (ref 0–199)
HDLC SERPL-MCNC: 41 MG/DL (ref 40–59)
LDL CHOLESTEROL CALCULATED: 95 MG/DL (ref 0–129)
TRIGL SERPL-MCNC: 300 MG/DL (ref 0–150)

## 2021-04-20 LAB
C. TRACHOMATIS DNA ,URINE: NEGATIVE
N. GONORRHOEAE DNA, URINE: NEGATIVE

## 2021-04-27 ENCOUNTER — VIRTUAL VISIT (OUTPATIENT)
Dept: INFECTIOUS DISEASES | Age: 61
End: 2021-04-27
Payer: MEDICARE

## 2021-04-27 ENCOUNTER — TELEPHONE (OUTPATIENT)
Dept: INFECTIOUS DISEASES | Age: 61
End: 2021-04-27

## 2021-04-27 DIAGNOSIS — B20 HUMAN IMMUNODEFICIENCY VIRUS (HCC): Primary | ICD-10-CM

## 2021-04-27 DIAGNOSIS — E78.2 ELEVATED CHOLESTEROL WITH HIGH TRIGLYCERIDES: ICD-10-CM

## 2021-04-27 DIAGNOSIS — E55.9 VITAMIN D DEFICIENCY: ICD-10-CM

## 2021-04-27 PROCEDURE — 99443 PR PHYS/QHP TELEPHONE EVALUATION 21-30 MIN: CPT | Performed by: INTERNAL MEDICINE

## 2021-04-27 NOTE — PROGRESS NOTES
2021    TELEHEALTH EVALUATION -- Audio/Visual (During VKUWY-18 public health emergency)      Ceci Grant (:  1960) has requested an audio/video evaluation for the following concern(s):    HIV    Due to the COVID-19 outbreak, patient's office visit was converted to a virtual visit. Patient was contacted and agreed to proceed with a virtual visit with me via Doxy. me with my location at the office. Patient reports that they are located at home. The risks and benefits of converting to a virtual visit were discussed in light of the current infectious disease epidemic. Services were provided through an audio/video synchronous discussion virtually to substitute for in person clinic visit. THIS virtual visit was conducted via interactive/real-time audio/video. Due to this being a TeleHealth encounter, evaluation of the following organ systems is limited: Vitals/Constitutional/EENT/Resp/CV/GI//MS/Neuro/Skin/Heme-Lymph-Imm.}    Pursuant to the emergency declaration under the [address] [place] [address], [facility] waiver authority and the [employer] Act, this Virtual Visit was conducted, with patient's consent, to reduce the patient's risk of exposure to COVID-19 and provide care for the patient. Patient Name: Ceci Grant  YOB: 1960  Patient Sex: female  Medical Record Number: [id number]    Background:  Diagnosed 2017  Symptoms started with UTI  WBCs have been low for years - possibility older infection   passed away. Has had other sexual partners - per patient all negative. No THC and no smoking. Periodic alcohol  No travel out of [place]. Hx of having a cat at home. No TB history  Sexual hx: Had trichomonas in the past. Possible BV. +anal warts history  Has chronic urinary incontinence as discussed previously  PCN allergy - years ago.  Unknown allergic reaction      New R hand thumb gout attack - saw pcp yesterday. She says that she was prescribed an ointment for the gout but cant go into details because she has to go  scripts soon from the pharmacy. Labs reviewed. States that she ate prior to the labs - elevated triglycerides have been ongoing though. She states that she will follow this with her PCP    Physical Exam  Since we cannot conduct an in-person exam, the following were addressed with the patient to the best of my capability via virtual visit:   Patient does not perceive any new visual deficits  No diaphoresis or flushing in the face  Patient is able to flex and extend her neck with ease. Patient can inhale and exhale without any difficulty and chest seems to be expanding symmetrically. No conversational dyspnea. Patient does not feel any palpitations   Patient's  abdomen is not protruberant beyond their normal size. No new swelling of their joints.   No observed neurological changes or slurred speech when speaking to the patient    No new skin rash or ulcers  R hand pain          Chemistry        Component Value Date/Time     04/15/2021 1657    K 3.9 04/15/2021 1657     04/15/2021 1657    CO2 27 04/15/2021 1657    BUN 15 04/15/2021 1657    CREATININE 0.65 04/15/2021 1657        Component Value Date/Time    CALCIUM 9.1 04/15/2021 1657    ALKPHOS 85 04/15/2021 1657    AST 29 04/15/2021 1657    ALT 35 (H) 04/15/2021 1657    BILITOT <0.2 04/15/2021 1657          No components found for: CHLPL  Lab Results   Component Value Date    TRIG 300 (H) 04/19/2021    TRIG 213 (H) 05/14/2020    TRIG 175 (H) 06/05/2019     Lab Results   Component Value Date    HDL 41 04/19/2021    HDL 47 05/14/2020    HDL 50 06/05/2019     Lab Results   Component Value Date    LDLCALC 95 04/19/2021    LDLCALC 132 (H) 05/14/2020    LDLCALC 144 (H) 06/05/2019       Impression/Plan  HIV infection - stable  Acute gout? - PCP is following  Labs reviewed with patient   Hx of Herpes - no current outbreak  Back pain - monitor, chronic  Diarrhea - chronic still 4-5 times per day  Hx HPV hx. Followed by gyn  hx of anal warts/HPV hx    Just had primary care visit - she says that she followed up her Vitamin D level, her triglycerides and her HTN with her PCP. She will also see her provider again in June and will discuss the COVID vaccine further. She has not had the vaccine and we discussed this. She is fearful of a vaccine reaction with COVID vaccine. We also discussed this. Follow up: 3 months    TELEPHONE ONLY VISIT  Time spent with patient: 25 min  Patient was counseled on the importance of proper handwashing, especially after handling wounds, surfaces, urine, and stool. If any diarrhea develops, patient is to refrain from the use of anti-diarrhea medications and is to call me immediately. Patient should refrain from touching their face. Questions regarding the current COVID outbreak addressed as needed.        [address] [facility]

## 2021-04-28 NOTE — TELEPHONE ENCOUNTER
Call placed to pt as V/V follow up. Reviewed apt with Dr. Nestor Pedro. She States the apt went well. She did request we go over lab results. States she got confused. New orders reviewed-- PCP follow up    Discussed compliance --- no issues. Pt has no questions regarding apt. 3 month follow up able to be scheduled. Discussed the need for lab work to be completed 1 week prior to next apt. Denies any other needs or acute issues that need doctor attention.

## 2021-05-13 ENCOUNTER — TELEPHONE (OUTPATIENT)
Dept: INFECTIOUS DISEASES | Age: 61
End: 2021-05-13

## 2021-05-13 NOTE — TELEPHONE ENCOUNTER
Call placed to pt to discuss up coming support group. Date 5.27.21    Discussed upcoming HIV support group. Pt is interested in coming. Denies need for transportation help. Directions, time and place reviewed with pt as well. Pt will call to confirm. Denies questions and will call with concerns. Denies any needs at this time. Denies any issues that need Doctor attention.  Will call with concerns

## 2021-05-14 RX ORDER — ELVITEGRAVIR, COBICISTAT, EMTRICITABINE, AND TENOFOVIR ALAFENAMIDE 150; 150; 200; 10 MG/1; MG/1; MG/1; MG/1
TABLET ORAL
Qty: 90 TABLET | Refills: 1 | Status: SHIPPED | OUTPATIENT
Start: 2021-05-14 | End: 2021-12-15

## 2021-05-26 ENCOUNTER — TELEPHONE (OUTPATIENT)
Dept: INFECTIOUS DISEASES | Age: 61
End: 2021-05-26

## 2021-05-26 NOTE — TELEPHONE ENCOUNTER
Spoke with pt regarding support group. She states she does want to come and will be there. If anything changes she will call. Denies needs and doing ok otherwise.

## 2021-06-01 ENCOUNTER — TELEPHONE (OUTPATIENT)
Dept: INFECTIOUS DISEASES | Age: 61
End: 2021-06-01

## 2021-06-01 NOTE — TELEPHONE ENCOUNTER
Pt presented for Support Group meeting. Pt was an active participant. [employer] activites were discussed by Yovany. Also discussed was \"the reaction to HIV diagnosis\". Pt shared her experience and was able to discuss \"who she got HIV from\" Group provided support. The importance of Oral Health was also discussed by ARTIS.

## 2021-06-08 ENCOUNTER — TELEPHONE (OUTPATIENT)
Dept: INFECTIOUS DISEASES | Age: 61
End: 2021-06-08

## 2021-06-08 NOTE — TELEPHONE ENCOUNTER
Spoke with pt for 1x1     Provided active listening while pt discussed multiple home issues and health concerns. 1x1 scheduled for in office 6.15.21 at 130. Requested she call office if needed.

## 2021-06-14 ENCOUNTER — TELEPHONE (OUTPATIENT)
Dept: INFECTIOUS DISEASES | Age: 61
End: 2021-06-14

## 2021-06-14 PROBLEM — M79.641 RIGHT HAND PAIN: Status: ACTIVE | Noted: 2020-10-01

## 2021-06-14 PROBLEM — M25.641 STIFFNESS OF RIGHT HAND JOINT: Status: ACTIVE | Noted: 2020-10-01

## 2021-06-14 PROBLEM — E66.811 OBESITY, CLASS I, BMI 30-34.9: Status: ACTIVE | Noted: 2020-05-30

## 2021-06-14 PROBLEM — E66.9 OBESITY, CLASS I, BMI 30-34.9: Status: ACTIVE | Noted: 2020-05-30

## 2021-06-14 RX ORDER — ERGOCALCIFEROL (VITAMIN D2) 1250 MCG
50000 CAPSULE ORAL WEEKLY
COMMUNITY
Start: 2021-01-20

## 2021-06-14 RX ORDER — PERMETHRIN 50 MG/G
CREAM TOPICAL
COMMUNITY
Start: 2020-11-20 | End: 2022-02-23 | Stop reason: ALTCHOICE

## 2021-06-14 RX ORDER — FAMOTIDINE 20 MG/1
20 TABLET, FILM COATED ORAL DAILY
COMMUNITY
Start: 2020-12-08 | End: 2022-02-23 | Stop reason: ALTCHOICE

## 2021-06-14 RX ORDER — PSYLLIUM HUSK 0.4 G
CAPSULE ORAL DAILY
COMMUNITY
Start: 2020-05-30

## 2021-06-14 RX ORDER — LOSARTAN POTASSIUM 25 MG/1
25 TABLET ORAL DAILY
COMMUNITY
Start: 2021-04-26 | End: 2022-02-23 | Stop reason: ALTCHOICE

## 2021-06-14 RX ORDER — TRIAMCINOLONE ACETONIDE 1 MG/G
CREAM TOPICAL 2 TIMES DAILY
COMMUNITY
Start: 2021-04-26 | End: 2022-02-23 | Stop reason: ALTCHOICE

## 2021-06-14 NOTE — TELEPHONE ENCOUNTER
Rescheduled 1x1 6.23.21    Reviewed pt new medication for BP losartan. No interaction with Genvoya noted. Providied active listening as pt discussed recently finding out who \"gave me HIV\"  provided reassurance and support    Denies concerns otherwise.

## 2021-06-25 ENCOUNTER — TELEPHONE (OUTPATIENT)
Dept: INFECTIOUS DISEASES | Age: 61
End: 2021-06-25

## 2021-06-28 ENCOUNTER — TELEPHONE (OUTPATIENT)
Dept: INFECTIOUS DISEASES | Age: 61
End: 2021-06-28

## 2021-06-28 NOTE — TELEPHONE ENCOUNTER
Received call from pt today requesting phone number for her OBGYN. Office number was found, and pt was given number. [phone]. Per pt, she is to follow up with Dr. Kiran Mills for yearly exams. Pt denies any further questions or concerns at this time.

## 2021-06-30 ENCOUNTER — TELEPHONE (OUTPATIENT)
Dept: INFECTIOUS DISEASES | Age: 61
End: 2021-06-30

## 2021-06-30 NOTE — TELEPHONE ENCOUNTER
Pt presented for Support Group Meeting. Pt was an active participant. [employer] activities was discussed by a Member. Cm providd information regarding HIV: 40 Years and discussed Stress and Coping.

## 2021-07-12 ENCOUNTER — NURSE ONLY (OUTPATIENT)
Dept: INFECTIOUS DISEASES | Age: 61
End: 2021-07-12

## 2021-07-12 DIAGNOSIS — B20 HIV INFECTION, UNSPECIFIED SYMPTOM STATUS (HCC): Primary | ICD-10-CM

## 2021-07-12 DIAGNOSIS — B20 HUMAN IMMUNODEFICIENCY VIRUS (HCC): Primary | ICD-10-CM

## 2021-07-12 NOTE — PROGRESS NOTES
Pt into office today for scheduled 1x1. provided active listening and support regarding multiple issues and concerns. We discussed diet and exercise. She states she has been able to loose some weight. NOTE: per pt BP was checked at start of apt and at end. Pt believes that high BP maybe related to office apts. 1st /78 Heart rate 66  2nd /72 heart rate 58  Weight per pt 168 lbs     Discussed upcoming HIV support group. Pt is interested in coming. Denies need for transportation help. Directions, time and place reviewed with pt as well. Pt will call to confirm. Requesting help with basic needs. Requesting food/gas voucher  Needs assessed. 2 gas cards given to pt. PPE provided and discussed with pt. Provided face mask ----  yes  Cloth masks--  0  Surgical masks--- 50  hand  -- 2  cleaning wipes -- 1  Toilet paper-- 6  RW Part A Provision, to reduce the spread of Covid-19.      COVID food vouchers --- NO    Pt denies any other issues or concerns that need doctor attention and will call office if needed

## 2021-07-13 ENCOUNTER — OFFICE VISIT (OUTPATIENT)
Dept: OBGYN CLINIC | Age: 61
End: 2021-07-13
Payer: MEDICARE

## 2021-07-13 VITALS
BODY MASS INDEX: 32.1 KG/M2 | HEIGHT: 61 IN | HEART RATE: 100 BPM | DIASTOLIC BLOOD PRESSURE: 82 MMHG | SYSTOLIC BLOOD PRESSURE: 126 MMHG | WEIGHT: 170 LBS

## 2021-07-13 DIAGNOSIS — Z11.51 SCREENING FOR HPV (HUMAN PAPILLOMAVIRUS): ICD-10-CM

## 2021-07-13 DIAGNOSIS — Z12.31 VISIT FOR SCREENING MAMMOGRAM: ICD-10-CM

## 2021-07-13 DIAGNOSIS — Z01.419 VISIT FOR GYNECOLOGIC EXAMINATION: Primary | ICD-10-CM

## 2021-07-13 PROCEDURE — 99396 PREV VISIT EST AGE 40-64: CPT | Performed by: OBSTETRICS & GYNECOLOGY

## 2021-07-13 NOTE — PROGRESS NOTES
Postmenopausal Annual    Subjective:     Fabián Saenz is a 61y.o. year old female    female who presents today for her annual well woman exam.  The patient is not sexually active. The patient is not taking hormone replacement therapy. Patient denies post-menopausal vaginal bleeding. The patient has regular exercise: no. Hx of HIV infection following by ID . Patient complaining of leakage with laughing coughing and sneezing has been going on for years and worsening.     Vitals:  /82 (Site: Left Upper Arm, Position: Sitting, Cuff Size: Medium Adult)   Pulse 100   Ht 5' 1\" (1.549 m)   Wt 170 lb (77.1 kg)   LMP 2012   BMI 32.12 kg/m²   Allergies:  Amoxicillin, Clobetasol, Doxycycline monohydrate, Fish oil, Fluconazole, Paroxetine, Pcn [penicillins], and Statins  Past Medical History:   Diagnosis Date    Anemia     Ankle fracture, right     in the past    Anxiety     Benign breast disease     right    Bipolar disorder (HCC)     Carpal tunnel syndrome     Cervicalgia     and cervical disc herniation (C4-C5 level) leading to right arm pain    Chronic cough     COPD (chronic obstructive pulmonary disease) (HCC)     at small airway level (PFT's with negative methacholine challenge test:  2009)    Elevated BP     Fracture of left clavicle     as a child    HIV (human immunodeficiency virus infection) ([facility] [address] 2017    Hyperlipidemia     Irritable bowel syndrome     Menorrhagia     Migraine     Panic attacks     PCOS (polycystic ovarian syndrome)     Right shoulder pain      Past Surgical History:   Procedure Laterality Date    CARPAL TUNNEL RELEASE      right    HEMORRHOID SURGERY      WRIST SURGERY      left     Family History   Problem Relation Age of Onset    Cancer Mother         breast    Heart Disease Brother 48    Diabetes Brother     Cancer Maternal Aunt         breast    Hypertension Father     Liver Disease Father     Cancer Maternal Aunt skin    Stroke Sister      Social History     Socioeconomic History    Marital status:      Spouse name: Not on file    Number of children: Not on file    Years of education: Not on file    Highest education level: Not on file   Occupational History    Not on file   Tobacco Use    Smoking status: Never Smoker    Smokeless tobacco: Never Used   Substance and Sexual Activity    Alcohol use: Yes     Alcohol/week: 0.0 standard drinks     Comment: occasional    Drug use: No    Sexual activity: Not Currently   Other Topics Concern    Not on file   Social History Narrative    Not on file     Social Determinants of Health     Financial Resource Strain:     Difficulty of Paying Living Expenses:    Food Insecurity:     Worried About Running Out of Food in the Last Year:     Ran Out of Food in the Last Year:    Transportation Needs:     Lack of Transportation (Medical):  Lack of Transportation (Non-Medical):    Physical Activity:     Days of Exercise per Week:     Minutes of Exercise per Session:    Stress:     Feeling of Stress :    Social Connections:     Frequency of Communication with Friends and Family:     Frequency of Social Gatherings with Friends and Family:     Attends Mandaen Services:     Active Member of Clubs or Organizations:     Attends Club or Organization Meetings:     Marital Status:    Intimate Partner Violence:     Fear of Current or Ex-Partner:     Emotionally Abused:     Physically Abused:     Sexually Abused:          Last mammogram denies   Breast cancer risk factors : mother 61 yrs. Last Pap last 1 yr ago wnl. Patient's last menstrual period was 05/05/2012.       Age at menopause onset: 52yo   Menopausal symptom assessment:  Vasomotor symptoms: occasional   Urinary incontinence &  symptoms: denies   Sexual dysfunction: denies   Present Hormonalmedications: denies     Osteoporosis risk assessment : none   Last bone mineral density : not indicated History of abnormal lipids: yes - on meds   Hypertension yes - on meds. Stroke/MI denies   HX OF HIV     Yearly flu vaccine recommended for persons aged 48 and older. Colonoscopy indicated . To follow . Review of Systems  Review of Systems  All other systems reviewed and are negative. Review of Systems   Constitutional: Negative for chills and fever. Respiratory: Negative for cough and shortness of breath. Cardiovascular: Negative for chest painand palpitations. Gastrointestinal: Negative for nausea and vomiting. Genitourinary: Negative for dysuria, frequency and urgency. Musculoskeletal: Negative formyalgias. Neurological: Negative for dizziness, seizures and headaches. Psychiatric/Behavioral: Negative for depression and suicidal ideas.     :     Vitals:  /82 (Site: Left Upper Arm, Position: Sitting, Cuff Size: Medium Adult)   Pulse 100   Ht 5' 1\" (1.549 m)   Wt 170 lb (77.1 kg)   LMP 05/05/2012   BMI 32.12 kg/m²     Physical Exam  Physical Exam    Constitutional: She is oriented to person, place, and time and well-developed, well-nourished, and in nodistress. HENT:   Head: Normocephalic and atraumatic. Eyes: Conjunctivae are normal. Pupils are equal, round, and reactive to light. Neck: Normal range ofmotion. Neck supple. Cardiovascular: Normal rate and regular rhythm. Pulmonary/Chest: Effort normal and breath sounds normal. No respiratory distress. Right breast exhibits noinverted nipple, no mass, no nipple discharge, no skin change and no tenderness. Left breast exhibits no inverted nipple, no mass, no nipple discharge, no skin change and no tenderness. Breasts are symmetrical.   Abdominal: Soft. Bowel sounds are normal.   Genitourinary: Vagina normal, uterus normal and cervix normal. No vaginal discharge found. Musculoskeletal: Normal range ofmotion. Neurological: She is alert and oriented to person, place, and time. She has normal reflexes.    Psychiatric: Memory, affect andjudgment normal.       Assessment:      Diagnosis Orders   1. Visit for gynecologic examination  PAP SMEAR   2. Screening for HPV (human papillomavirus)  PAP SMEAR   3. Visit for screening mammogram  LUANNE DIGITAL SCREEN W OR WO CAD BILATERAL       Body mass index is 32.12 kg/m². Obesity:  Class I  Smoking:  No    Plan:   PAP SMEAR : indicated:  performed. Breast exam : Normal   History of stress urinary incontinence, patient counseled about transobturator tape surgery. Patient would like to schedule an appointment for follow-up to discuss treatment options including mentioned procedure. Obesity Counseling:  Given  Smoking Counseling:N/A    Orders Placed This Encounter   Procedures    LUANNE DIGITAL SCREEN W OR WO CAD BILATERAL     Standing Status:   Future     Standing Expiration Date:   9/13/2022     Order Specific Question:   Reason for exam:     Answer:   screening    PAP SMEAR     Standing Status:   Future     Standing Expiration Date:   7/13/2022     Order Specific Question:   Collection Type     Answer: Thin Prep     Order Specific Question:   Prior Abnormal Pap Test     Answer:   No     Order Specific Question:   Screening or Diagnostic     Answer:   Screening     Order Specific Question:   HPV Requested? Answer:   Yes     Order Specific Question:   High Risk Patient     Answer:   N/A       No orders of the defined types were placed in this encounter. Follow up:  Return in about 1 year (around 7/13/2022) for next annual exam visit. Amy Garcia MD        HEALTH MAINTENANCE:   Health information given. Women's Health patient information and counselling done. regarding risk/benefits/alternatives to Hormone Therapy and need for yearly re-evaluation. Periodic Pap smear discussed. Mammogram recommended yearly. Colon cancer screening by age 48 & every 5-10years.   Bone mineral density (by age 72 or sooner if indication it would affect Hormone Therapy management or other risk factors for osteoporosis). Liza Whalen M.D., [email or web address]. G

## 2021-07-20 LAB — PAP SMEAR: NORMAL

## 2021-07-26 ENCOUNTER — TELEPHONE (OUTPATIENT)
Dept: INFECTIOUS DISEASES | Age: 61
End: 2021-07-26

## 2021-07-27 DIAGNOSIS — B20 HIV INFECTION, UNSPECIFIED SYMPTOM STATUS (HCC): ICD-10-CM

## 2021-07-27 LAB
ALBUMIN SERPL-MCNC: 4.5 G/DL (ref 3.5–4.6)
ALP BLD-CCNC: 72 U/L (ref 40–130)
ALT SERPL-CCNC: 37 U/L (ref 0–33)
ANION GAP SERPL CALCULATED.3IONS-SCNC: 14 MEQ/L (ref 9–15)
AST SERPL-CCNC: 18 U/L (ref 0–35)
BILIRUB SERPL-MCNC: 0.3 MG/DL (ref 0.2–0.7)
BUN BLDV-MCNC: 20 MG/DL (ref 8–23)
CALCIUM SERPL-MCNC: 9.3 MG/DL (ref 8.5–9.9)
CHLORIDE BLD-SCNC: 103 MEQ/L (ref 95–107)
CO2: 24 MEQ/L (ref 20–31)
CREAT SERPL-MCNC: 0.78 MG/DL (ref 0.5–0.9)
GFR AFRICAN AMERICAN: >60
GFR NON-AFRICAN AMERICAN: >60
GLOBULIN: 2.5 G/DL (ref 2.3–3.5)
GLUCOSE BLD-MCNC: 99 MG/DL (ref 70–99)
HCT VFR BLD CALC: 43.8 % (ref 37–47)
HEMOGLOBIN: 14.1 G/DL (ref 12–16)
MCH RBC QN AUTO: 30.8 PG (ref 27–31.3)
MCHC RBC AUTO-ENTMCNC: 32.3 % (ref 33–37)
MCV RBC AUTO: 95.5 FL (ref 82–100)
PDW BLD-RTO: 15.6 % (ref 11.5–14.5)
PLATELET # BLD: 221 K/UL (ref 130–400)
POTASSIUM SERPL-SCNC: 4.3 MEQ/L (ref 3.4–4.9)
RBC # BLD: 4.59 M/UL (ref 4.2–5.4)
SODIUM BLD-SCNC: 141 MEQ/L (ref 135–144)
TOTAL PROTEIN: 7 G/DL (ref 6.3–8)
WBC # BLD: 5.5 K/UL (ref 4.8–10.8)

## 2021-07-29 LAB
ABSOLUTE CD 4 HELPER: 761 CELLS/UL (ref 430–1800)
CD4 % HELPER T CELL: 60 % (ref 32–64)
LYMPHOCYTE SUBSET PANEL 2 INFO: NORMAL

## 2021-08-03 ENCOUNTER — TELEPHONE (OUTPATIENT)
Dept: INFECTIOUS DISEASES | Age: 61
End: 2021-08-03

## 2021-08-03 NOTE — TELEPHONE ENCOUNTER
Health dept aware of questionable COVID exposure at support group 7.27.21 and does not think this is an exposure due to pts wearing masks. Pt notified of same  No support group for the month of August at this time. NO pt PHI regarding COVID + pt discussed. Provided active listening as pt discussed car and work issues. This is causing a lot of stress. Reminder reviewed about upcoming apt with Dr Susana Trimble. Denies issues.

## 2021-08-10 ENCOUNTER — HOSPITAL ENCOUNTER (OUTPATIENT)
Dept: WOMENS IMAGING | Age: 61
Discharge: HOME | End: 2021-08-12
Payer: MEDICARE

## 2021-08-10 DIAGNOSIS — Z12.31 VISIT FOR SCREENING MAMMOGRAM: ICD-10-CM

## 2021-08-10 PROCEDURE — 77067 SCR MAMMO BI INCL CAD: CPT

## 2021-08-11 ENCOUNTER — NURSE ONLY (OUTPATIENT)
Dept: INFECTIOUS DISEASES | Age: 61
End: 2021-08-11

## 2021-08-11 DIAGNOSIS — B20 HUMAN IMMUNODEFICIENCY VIRUS (HCC): Primary | ICD-10-CM

## 2021-08-11 NOTE — PROGRESS NOTES
Provided active listening as support as pt discussed mulpltie issues. No specific topics. Provided socialization. Reviewed resulted labs. Pt denied questions or concerns. Adherence-- no concerns. No misses. Denies any needs at this time. Denies any issues that need Doctor attention. Will call with concerns.

## 2021-08-17 ENCOUNTER — OFFICE VISIT (OUTPATIENT)
Dept: INFECTIOUS DISEASES | Age: 61
End: 2021-08-17
Payer: MEDICARE

## 2021-08-17 VITALS
DIASTOLIC BLOOD PRESSURE: 78 MMHG | SYSTOLIC BLOOD PRESSURE: 125 MMHG | BODY MASS INDEX: 31.78 KG/M2 | HEART RATE: 69 BPM | TEMPERATURE: 97.5 F | WEIGHT: 168.2 LBS

## 2021-08-17 DIAGNOSIS — I10 ESSENTIAL HYPERTENSION: ICD-10-CM

## 2021-08-17 DIAGNOSIS — E78.2 HYPERLIPIDEMIA, MIXED: ICD-10-CM

## 2021-08-17 DIAGNOSIS — B20 HUMAN IMMUNODEFICIENCY VIRUS (HCC): Primary | ICD-10-CM

## 2021-08-17 PROCEDURE — G8427 DOCREV CUR MEDS BY ELIG CLIN: HCPCS | Performed by: INTERNAL MEDICINE

## 2021-08-17 PROCEDURE — 1036F TOBACCO NON-USER: CPT | Performed by: INTERNAL MEDICINE

## 2021-08-17 PROCEDURE — 99214 OFFICE O/P EST MOD 30 MIN: CPT | Performed by: INTERNAL MEDICINE

## 2021-08-17 PROCEDURE — G8417 CALC BMI ABV UP PARAM F/U: HCPCS | Performed by: INTERNAL MEDICINE

## 2021-08-17 PROCEDURE — 3017F COLORECTAL CA SCREEN DOC REV: CPT | Performed by: INTERNAL MEDICINE

## 2021-08-17 NOTE — PROGRESS NOTES
Pt here for 6 month B20 f/u. Pt states 100% compliance with medications. Denies any issues at this time. Provided 1 box surgical face mask, 2 hand , and 4 rolls of toilet paper, RW Part A Provision, to reduce the spread of Covid-19. Pt came into clinic, requesting help with basic needs. Requesting food/gas vouchers. Needs assessed. 2 food vouchers and 2 gas cards given to pt. Denies other needs at this time. Will call with any questions or concerns.

## 2021-08-17 NOTE — PROGRESS NOTES
Fabián Saenz (:  1960) is a 61 y.o. female, established patient, here for evaluation of the following chief complaint(s):  HIV (6 month B20 f/u)         ASSESSMENT/PLAN:  Asymptomatic HIV with suppressed virus CD4 greater than 700  UnControlled hypertension    Continue Genvoya rosuvastatin losartan vitamin B12 multivitamin and clonazepam  Follow-up at the [facility] for depression  CBC complete metabolic profile HIV viral load CD4 in 3 months  Follow-up in 3 months  SUBJECTIVE/OBJECTIVE:  HPI  Follow-up HIV infection diagnosed , suppressed virus on Genvoya. CD4 greater than 700  Patient of Dr. Malachi Guzmán  100% compliant with medications  History of hearing loss, mixed moderate  History of depression and hypertension. History of hyperlipidemia  No THC and no soking. Periodic alcohol  No travel out of [place]. Cat at home. No TB history  Has had trichomonas in the past. Possible BV. +anal warts history  Review of Systems   All other systems reviewed and are negative. Objective   Physical Exam  Vitals and nursing note reviewed. Constitutional:       General: She is not in acute distress. HENT:      Head: Normocephalic. Nose: No congestion. Mouth/Throat:      Pharynx: No oropharyngeal exudate. Eyes:      General: No scleral icterus. Cardiovascular:      Rate and Rhythm: Normal rate and regular rhythm. Heart sounds: No murmur heard. Pulmonary:      Effort: Pulmonary effort is normal. No respiratory distress. Breath sounds: Normal breath sounds. No wheezing or rales. Abdominal:      General: Abdomen is flat. There is no distension. Palpations: Abdomen is soft. There is no mass. Tenderness: There is no abdominal tenderness. There is no right CVA tenderness or left CVA tenderness. Musculoskeletal:         General: No swelling. Cervical back: No rigidity. Right lower leg: No edema. Left lower leg: No edema.    Lymphadenopathy:      Cervical: No cervical adenopathy. Skin:     General: Skin is warm. Coloration: Skin is not jaundiced. Findings: No erythema or rash. Neurological:      General: No focal deficit present. Mental Status: She is alert and oriented to person, place, and time. Cranial Nerves: No cranial nerve deficit. Motor: No weakness. Gait: Gait normal.   Psychiatric:         Mood and Affect: Mood normal.         Behavior: Behavior normal.         Thought Content:  Thought content normal.         Judgment: Judgment normal.           Bilateral screening mammogram was negative  7/27/2021  5:57 PM - [facility], [facility] Incoming Lab Results From Soft    Component Value Ref Range & Units Status Collected Lab   WBC 5.5  4.8 - 10.8 K/uL Final 07/27/2021  4:18 PM [address] [facility]   RBC 4.59  4.20 - 5.40 M/uL Final 07/27/2021  4:18 PM MH - [facility]   Hemoglobin 14.1  12.0 - 16.0 g/dL Final 07/27/2021  4:18 PM MH - [facility]   Hematocrit 43.8  37.0 - 47.0 % Final 07/27/2021  4:18 PM MH - [facility]   MCV 95.5  82.0 - 100.0 fL Final 07/27/2021  4:18 PM MH - [facility]   MCH 30.8  27.0 - 31.3 pg Final 07/27/2021  4:18 PM [address] [facility]   MCHC 32.3Low   33.0 - 37.0 % Final 07/27/2021  4:18 PM [address] [facility]   RDW 15.6High   11.5 - 14.5 % Final 07/27/2021  4:18 PM MH - [facility]   Platelets 365  038 - 400 K/uL Final 07/27/2021  4:18 PM      7/27/2021  6:12 PM - [facility], [facility] Incoming Lab Results From Soft    Component Value Ref Range & Units Status Collected Lab   Sodium 141  135 - 144 mEq/L Final 07/27/2021  4:18 PM [address] [facility]   Potassium 4.3  3.4 - 4.9 mEq/L Final 07/27/2021  4:18 PM MH - [facility]   Chloride 103  95 - 107 mEq/L Final 07/27/2021  4:18 PM [facility]   CO2 24  20 - 31 mEq/L Final 07/27/2021  4:18 PM MH - [facility]   Anion Gap 14  9 - 15 mEq/L Final 07/27/2021  4:18 PM [address] [facility]   Glucose : Batsheva Abbasi. Heath Galicia MD    CD4 % Jackson T Cell 60  32 - 64 % Final 07/27/2021  4:18 PM [facility]   Absolute CD 4 Jackson 761  430 - 1800 cells/uL Final 07/27/2021  4:18 PM [facility]   Testing Performed By    Lab - Abbreviation Name Director        On this date 8/17/2021 I have spent 30 minutes reviewing previous notes, test results and face to face with the patient discussing the diagnosis and importance of compliance with the treatment plan as well as documenting on the day of the visit. An electronic signature was used to authenticate this note.     --Jami Lopez MD

## 2021-09-08 ENCOUNTER — TELEPHONE (OUTPATIENT)
Dept: INFECTIOUS DISEASES | Age: 61
End: 2021-09-08

## 2021-09-16 ENCOUNTER — NURSE ONLY (OUTPATIENT)
Dept: INFECTIOUS DISEASES | Age: 61
End: 2021-09-16

## 2021-09-16 NOTE — PROGRESS NOTES
Re-Assessment      Patient ID:   Kay Palmer  Date:   9/16/2021      Client ID:  [id number]    [address] [address] [phone] (home)     Family/House:    [] Partner  [] Children  [] Other -     Mental Health:   Hx of MH    Substance Abuse:   NA  Social History     Socioeconomic History    Marital status:      Spouse name: Not on file    Number of children: Not on file    Years of education: Not on file    Highest education level: Not on file   Occupational History    Not on file   Tobacco Use    Smoking status: Never Smoker    Smokeless tobacco: Never Used   Substance and Sexual Activity    Alcohol use: Yes     Alcohol/week: 0.0 standard drinks     Comment: occasional    Drug use: No    Sexual activity: Not Currently   Other Topics Concern    Not on file   Social History Narrative    Not on file     Social Determinants of Health     Financial Resource Strain:     Difficulty of Paying Living Expenses:    Food Insecurity:     Worried About Running Out of Food in the Last Year:     Ran Out of Food in the Last Year:    Transportation Needs:     Lack of Transportation (Medical):      Lack of Transportation (Non-Medical):    Physical Activity:     Days of Exercise per Week:     Minutes of Exercise per Session:    Stress:     Feeling of Stress :    Social Connections:     Frequency of Communication with Friends and Family:     Frequency of Social Gatherings with Friends and Family:     Attends [employer] Services:     Active Member of Clubs or Organizations:     Attends Club or Organization Meetings:     Marital Status:    Intimate Partner Violence:     Fear of Current or Ex-Partner:     Emotionally Abused:     Physically Abused:     Sexually Abused:        Housing:   apartment    Health/Healthcare:  HIV Stable    Physician Visit:  Dr. Donavon Avendaño 8/2   Dental Visit:  CM encouraged follow up    CD4:   Lab Results   Component Value Date    LABABSO 761 07/27/2021 Viral Load:  Lab Results   Component Value Date    HVF5BXCRDZB <20 05/30/2018    JHS2AJGJII Not Detected 07/27/2021       Medical Insurance:  Payor: Caitlyn Saucedo / Plan: Kodi Thompson / Product Type: *No Product type* /    OHDAP/HIPSCA:  NA   Renewal Date:  NA    Income:    SSDI, Spouse's Pension    Legal Issues:    NA    Emergency Contact:   Extended Emergency Contact Information  Primary Emergency Contact: [address]  Phone: [phone]  Relation: Child  Appointment via phone. CM called pt for scheduled appointment. CM completed Semi-Annual Review, no changes to RW Part A Eligibility for services. CM reviewed Grievance Policy and Sliding Scale Fee with pt. CM completed PSA and SA/MH Assessments. Pt has no hx of SA, has hx of MH. Pt has stopped counseling at [facility] for some time. RN has been providing 1x1 for support. CM offered 1x1 for support in addition, scheduled appointment 9/23/21 @ 11AM. CM updated ISP, pt signed in agreement with POC. Pt remains compliant with HIV meds, VL from 7/21 is undetectable. Pt has not had a dental visit since Pandemic. CM encouraged follow up. Pt is sexually active, oral sex. Pt is aware of U=U, undetectable equals un-transmittable. CM provided socialization, pt discusse hesitancy in obtaining Covid vaccine. Cm provided information regarding the benefits of receiving the vaccine. CM will follow up with pt as needed.

## 2021-10-07 ENCOUNTER — NURSE ONLY (OUTPATIENT)
Dept: INFECTIOUS DISEASES | Age: 61
End: 2021-10-07
Payer: MEDICARE

## 2021-10-07 DIAGNOSIS — Z23 NEED FOR INFLUENZA VACCINATION: Primary | ICD-10-CM

## 2021-10-07 PROCEDURE — G0008 ADMIN INFLUENZA VIRUS VAC: HCPCS | Performed by: INTERNAL MEDICINE

## 2021-10-07 PROCEDURE — 90674 CCIIV4 VAC NO PRSV 0.5 ML IM: CPT | Performed by: INTERNAL MEDICINE

## 2021-10-07 NOTE — PROGRESS NOTES
Pt presented for scheduled appointment with CM. CM provided 1x1 for support. Pt gave background of self. Pt discussed relationship with children, and \"friend\". Pt stated she does not have many friends. Pt has bouts of depression, stated she is like the weather, her moods change from one day to the next. CM provided active listening and support. Pt receives [facility]. Pt has been receiving support from RN, with whom she is well connected. CM will provide 1x1 as needed or per request.  CM provided food and gas voucher, need assessed. Provisions as last resort.

## 2021-10-25 ENCOUNTER — TELEPHONE (OUTPATIENT)
Dept: INFECTIOUS DISEASES | Age: 61
End: 2021-10-25

## 2021-10-25 DIAGNOSIS — B20 HIV INFECTION, UNSPECIFIED SYMPTOM STATUS (HCC): Primary | ICD-10-CM

## 2021-10-25 NOTE — TELEPHONE ENCOUNTER
Call placed to pt as appt reminder. Pt preferred to R/S V/V with Dr Susan Galicia 1 week prior to appt. BP and weight check scheduled for this Wednesday,. Provided active listening as pt discussed home issues. Nephew fell. Post hospital. She is caring for him right now. Stressful. Denies other needs or concerns that Dr Candie Hartley needs to address.

## 2021-10-27 DIAGNOSIS — B20 HIV INFECTION, UNSPECIFIED SYMPTOM STATUS (HCC): ICD-10-CM

## 2021-10-27 LAB
ALBUMIN SERPL-MCNC: 4.4 G/DL (ref 3.5–4.6)
ALP BLD-CCNC: 78 U/L (ref 40–130)
ALT SERPL-CCNC: 31 U/L (ref 0–33)
ANION GAP SERPL CALCULATED.3IONS-SCNC: 14 MEQ/L (ref 9–15)
AST SERPL-CCNC: 21 U/L (ref 0–35)
BILIRUB SERPL-MCNC: 0.3 MG/DL (ref 0.2–0.7)
BUN BLDV-MCNC: 16 MG/DL (ref 8–23)
CALCIUM SERPL-MCNC: 9.1 MG/DL (ref 8.5–9.9)
CHLORIDE BLD-SCNC: 101 MEQ/L (ref 95–107)
CO2: 26 MEQ/L (ref 20–31)
CREAT SERPL-MCNC: 0.83 MG/DL (ref 0.5–0.9)
GFR AFRICAN AMERICAN: >60
GFR NON-AFRICAN AMERICAN: >60
GLOBULIN: 2.2 G/DL (ref 2.3–3.5)
GLUCOSE BLD-MCNC: 101 MG/DL (ref 70–99)
HCT VFR BLD CALC: 42.6 % (ref 37–47)
HEMOGLOBIN: 14.3 G/DL (ref 12–16)
MCH RBC QN AUTO: 32.1 PG (ref 27–31.3)
MCHC RBC AUTO-ENTMCNC: 33.5 % (ref 33–37)
MCV RBC AUTO: 95.8 FL (ref 82–100)
PDW BLD-RTO: 14.4 % (ref 11.5–14.5)
PLATELET # BLD: 195 K/UL (ref 130–400)
POTASSIUM SERPL-SCNC: 4.4 MEQ/L (ref 3.4–4.9)
RBC # BLD: 4.45 M/UL (ref 4.2–5.4)
SODIUM BLD-SCNC: 141 MEQ/L (ref 135–144)
TOTAL PROTEIN: 6.6 G/DL (ref 6.3–8)
WBC # BLD: 6.6 K/UL (ref 4.8–10.8)

## 2021-10-28 ENCOUNTER — NURSE ONLY (OUTPATIENT)
Dept: INFECTIOUS DISEASES | Age: 61
End: 2021-10-28

## 2021-10-28 VITALS
HEART RATE: 71 BPM | SYSTOLIC BLOOD PRESSURE: 129 MMHG | BODY MASS INDEX: 31.48 KG/M2 | DIASTOLIC BLOOD PRESSURE: 78 MMHG | WEIGHT: 166.6 LBS

## 2021-10-28 DIAGNOSIS — B20 HUMAN IMMUNODEFICIENCY VIRUS (HCC): Primary | ICD-10-CM

## 2021-10-28 NOTE — PROGRESS NOTES
Pt presented for scheduled apt for BP check. Last BP 8.17.21---125/78  Todays /79    Medication change--- pt states that for the past month she has been taking half tablet of her BP medication Losartan. prescribed 50 mg pt is taking 25 mg     Pt will follow up with PCP. She will continue to work on diet and exercise.    Will call office if needed

## 2021-10-30 LAB
ABSOLUTE CD 4 HELPER: 708 CELLS/UL (ref 430–1800)
CD4 % HELPER T CELL: 57 % (ref 32–64)
LYMPHOCYTE SUBSET PANEL 2 INFO: NORMAL

## 2021-11-24 ENCOUNTER — TELEPHONE (OUTPATIENT)
Dept: INFECTIOUS DISEASES | Age: 61
End: 2021-11-24

## 2021-11-24 NOTE — TELEPHONE ENCOUNTER
Pt called into clinic requesting help with basic needs. Requesting food/gas voucher    Pt into clinc  Needs assessed. 1 food and 2 gas cards given to pt via [facility]      Denies any needs at this time. Denies any issues that need Doctor attention.  Will call with concerns

## 2021-11-29 ENCOUNTER — TELEPHONE (OUTPATIENT)
Dept: INFECTIOUS DISEASES | Age: 61
End: 2021-11-29

## 2021-11-29 NOTE — TELEPHONE ENCOUNTER
Pt called in to talk. Provided active listening as pt discussed concerns for her family. Her daughter and grand kids are COVID +. Encouraged hydration, motirn or tylenol if needed for fever and pain. They have been taking vitamins as well.

## 2021-12-06 ENCOUNTER — VIRTUAL VISIT (OUTPATIENT)
Dept: FAMILY MEDICINE CLINIC | Age: 61
End: 2021-12-06
Payer: MEDICARE

## 2021-12-06 DIAGNOSIS — Z20.822 CLOSE EXPOSURE TO COVID-19 VIRUS: ICD-10-CM

## 2021-12-06 DIAGNOSIS — Z20.822 CLOSE EXPOSURE TO COVID-19 VIRUS: Primary | ICD-10-CM

## 2021-12-06 PROCEDURE — 3017F COLORECTAL CA SCREEN DOC REV: CPT | Performed by: PHYSICIAN ASSISTANT

## 2021-12-06 PROCEDURE — 99213 OFFICE O/P EST LOW 20 MIN: CPT | Performed by: PHYSICIAN ASSISTANT

## 2021-12-06 PROCEDURE — G8427 DOCREV CUR MEDS BY ELIG CLIN: HCPCS | Performed by: PHYSICIAN ASSISTANT

## 2021-12-06 ASSESSMENT — ENCOUNTER SYMPTOMS
CHEST TIGHTNESS: 0
COUGH: 0
SORE THROAT: 0
NAUSEA: 0
SINUS PAIN: 0
ABDOMINAL PAIN: 0
VOMITING: 0
SINUS PRESSURE: 0
BACK PAIN: 0
SHORTNESS OF BREATH: 0
DIARRHEA: 0

## 2021-12-06 NOTE — LETTER
[address]  [place] [address]  Phone: [phone]  Fax: [address], [address]    December 6, 2021     Patient: Elvira Elizalde   Date of Visit: 12/6/2021       To Whom it May Concern:    Elvira Elizalde was evaluated during a virtual visit and tested today in the clinic. It is my medical opinion that the patient should not return to work/school until COVID-19 test results are back. We expect results in 3-8 days. If there are questions or concerns, please have the patient contact our office. Thank you for your assistance in this matter.           Sincerely,        Electronically signed by AMANDA Lo on 12/6/2021 at 6:41 PM

## 2021-12-06 NOTE — PROGRESS NOTES
week  Historical Provider, MD   famotidine (PEPCID) 20 MG tablet Take 20 mg by mouth daily   Historical Provider, MD   losartan (COZAAR) 25 MG tablet Take 25 mg by mouth daily  Historical Provider, MD   permethrin (ELIMITE) 5 % cream Apply cream to entire body x10 hours. Patient not taking: Reported on 7/13/2021  Historical Provider, MD   triamcinolone (KENALOG) 0.1 % cream Apply topically 2 times daily  Patient not taking: Reported on 8/17/2021  Historical Provider, MD   GENVOYA [phone] MG TABLET TAKE ONE TABLET BY MOUTH EVERY DAY  Gypsy Jerry DO   valACYclovir (VALTREX) 500 MG tablet Take 1 tablet by mouth daily  Patient not taking: Reported on 7/13/2021  Diane Yanez MD   Urea (CARMOL) 40 % cream Apply topically  Patient not taking: Reported on 8/17/2021  Historical Provider, MD   ibuprofen (ADVIL;MOTRIN) 600 MG tablet Take 600 mg by mouth  Historical Provider, MD   loperamide (LOPERAMIDE A-D) 2 MG tablet Take 0.5 tablets by mouth  Patient not taking: Reported on 7/13/2021  Historical Provider, MD   rosuvastatin (CRESTOR) 5 MG tablet 5 mg every other day   Patient not taking: Reported on 8/17/2021  Historical Provider, MD   cyanocobalamin 1000 MCG tablet Take 1,000 mcg by mouth  Historical Provider, MD   clonazepam (KLONOPIN) 0.5 MG tablet Take 0.25 mg by mouth 2 times daily as needed. Historical Provider, MD   Multiple Vitamin (MULTIVITAMIN PO) Take 1 tablet by mouth daily. Historical Provider, MD       Social History     Tobacco Use    Smoking status: Never Smoker    Smokeless tobacco: Never Used   Substance Use Topics    Alcohol use: Yes     Alcohol/week: 0.0 standard drinks     Comment: occasional    Drug use: No            PHYSICAL EXAMINATION:  [ INSTRUCTIONS:  \"[x]\" Indicates a positive item  \"[]\" Indicates a negative item  -- DELETE ALL ITEMS NOT EXAMINED]  Patient is A&O x3. The patient is able to speak in clear and complete sentences without dyspnea.  Patient did not cough during our interaction. Patient did not sound congested. No wheezing or adventitious breath sounds. Thoughts, perception, and judgement are intact. Due to this being a TeleHealth encounter, evaluation of the following organ systems is limited: Vitals/Constitutional/EENT/Resp/CV/GI//MS/Neuro/Skin/Heme-Lymph-Imm. ASSESSMENT/PLAN:  1. Close exposure to COVID-19 virus  - The patient was advised to remain isolated after their COVID-19 test pending their results. The patient was informed that the results will take 2-3 days and that someone will contact the patient of their results.   - We'll call with COVID-19 results  Results will also be available on your [facility] account, if activated    Things to Know:     - Do not leave home except to get medical care while waiting for your results  - Testing does not change treatment- there is no medication that treats COVID-19  - Stay well-hydrated, rest as needed  - May take over the counter medicine acetaminophen (aka Tylenol) for pain or fever if needed according to the directions on the box if tolerated no allergies  - Monitor your symptoms + contact a medical provider if symptoms are worsening- such as difficulty breathing  - Follow social distancing guidelines and wear a face mask when leaving home is necessary  - Symptoms may develop 2 days to 2 weeks following exposure to the virus- if you are exposed after testing, or if you were in the incubation period when tested, you could become ill with COVID-19 later    Seek emergency medical care immediately (ER/ call 911) if you have trouble breathing, persistent pain or pressure in the chest, new confusion, inability to wake or stay awake, bluish lips or face, or any other signs/ symptoms that you think could be an emergency. - Covid-19 Ambulatory; Future      Return if symptoms worsen or fail to improve. An  electronic signature was used to authenticate this note.     --AMANDA Trejo on 12/6/2021 at 7:12 PM        Pursuant to the emergency declaration under the [facility] waiver authority and the [employer] Act, this Virtual  Visit was conducted, with patient's consent, to reduce the patient's risk of exposure to COVID-19 and provide continuity of care for an established patient. Services were provided through a video synchronous discussion virtually to substitute for in-person clinic visit.

## 2021-12-07 ENCOUNTER — TELEPHONE (OUTPATIENT)
Dept: INFECTIOUS DISEASES | Age: 61
End: 2021-12-07

## 2021-12-07 NOTE — TELEPHONE ENCOUNTER
Pt called in health concerns regarding fa,amna and COVID. Her granddaughter tested + for COVID yesterday. Got tested due to possible family exposure and new cough. Pt is extremely anxious and nervous. States she has so much going on. Tearful on phone. Provided reassurance and support. Pt denies ANY COVID symptoms. She will go for testing today or tomorrow \"to be safe\" states her grandson is deploying back to [employer] and she night not be able to see him before he leaves. Discussed with pt to monitor symptoms. Reviewed COVID related symptoms. Discussed hydration and tylenol for fevers if needed. She takes multiple vitamins. She will call right away if she tests COVID + or if she has further questions or concerns.

## 2021-12-09 LAB
SARS-COV-2: NOT DETECTED
SOURCE: NORMAL

## 2021-12-10 ENCOUNTER — TELEPHONE (OUTPATIENT)
Dept: INFECTIOUS DISEASES | Age: 61
End: 2021-12-10

## 2021-12-10 NOTE — TELEPHONE ENCOUNTER
Spoke with pt. Tested negative for COVID. Daughter is + and is not staying with pt. Pt feels fine. No symptoms. provided active listening as pt discussed family concerns. Home phone disconnected right now. Issue with Internet company. She working on it. Cell number updated for Epic.

## 2021-12-15 RX ORDER — ELVITEGRAVIR, COBICISTAT, EMTRICITABINE, AND TENOFOVIR ALAFENAMIDE 150; 150; 200; 10 MG/1; MG/1; MG/1; MG/1
TABLET ORAL
Qty: 90 TABLET | Refills: 1 | Status: SHIPPED | OUTPATIENT
Start: 2021-12-15 | End: 2022-06-10

## 2021-12-29 ENCOUNTER — VIRTUAL VISIT (OUTPATIENT)
Dept: INFECTIOUS DISEASES | Age: 61
End: 2021-12-29
Payer: MEDICARE

## 2021-12-29 DIAGNOSIS — Z28.9: ICD-10-CM

## 2021-12-29 DIAGNOSIS — B20 CURRENTLY ASYMPTOMATIC HIV INFECTION, WITH HISTORY OF HIV-RELATED ILLNESS (HCC): Primary | ICD-10-CM

## 2021-12-29 DIAGNOSIS — Z28.310: ICD-10-CM

## 2021-12-29 PROCEDURE — G8482 FLU IMMUNIZE ORDER/ADMIN: HCPCS | Performed by: INTERNAL MEDICINE

## 2021-12-29 PROCEDURE — 1036F TOBACCO NON-USER: CPT | Performed by: INTERNAL MEDICINE

## 2021-12-29 PROCEDURE — 3017F COLORECTAL CA SCREEN DOC REV: CPT | Performed by: INTERNAL MEDICINE

## 2021-12-29 PROCEDURE — 99214 OFFICE O/P EST MOD 30 MIN: CPT | Performed by: INTERNAL MEDICINE

## 2021-12-29 PROCEDURE — G8417 CALC BMI ABV UP PARAM F/U: HCPCS | Performed by: INTERNAL MEDICINE

## 2021-12-29 PROCEDURE — G8428 CUR MEDS NOT DOCUMENT: HCPCS | Performed by: INTERNAL MEDICINE

## 2021-12-29 NOTE — PROGRESS NOTES
gout. No recent attack  Viral load suppressed. Currently on Genvoya. Does not want the covid vaccine. Took the flu vaccine. Physical Exam  Since we cannot conduct an in-person exam, the following were addressed with the patient to the best of my capability via virtual visit:   Patient does not perceive any new visual deficits  No diaphoresis or flushing in the face  Patient is able to flex and extend her neck with ease. Patient can inhale and exhale without any difficulty and chest seems to be expanding symmetrically. No conversational dyspnea. Patient does not feel any palpitations   Patient's  abdomen is not protruberant beyond their normal size. No new swelling of their joints. No observed neurological changes or slurred speech when speaking to the patient    No new skin rash or ulcers  Obese female        Chemistry        Component Value Date/Time     10/27/2021 1359    K 4.4 10/27/2021 1359     10/27/2021 1359    CO2 26 10/27/2021 1359    BUN 16 10/27/2021 1359    CREATININE 0.83 10/27/2021 1359        Component Value Date/Time    CALCIUM 9.1 10/27/2021 1359    ALKPHOS 78 10/27/2021 1359    AST 21 10/27/2021 1359    ALT 31 10/27/2021 1359    BILITOT 0.3 10/27/2021 1359          No components found for: CHLPL  Lab Results   Component Value Date    TRIG 300 (H) 04/19/2021    TRIG 213 (H) 05/14/2020    TRIG 175 (H) 06/05/2019     Lab Results   Component Value Date    HDL 41 04/19/2021    HDL 47 05/14/2020    HDL 50 06/05/2019     Lab Results   Component Value Date    LDLCALC 95 04/19/2021    LDLCALC 132 (H) 05/14/2020    LDLCALC 144 (H) 06/05/2019       Impression/Plan  HIV infection - stable  Hx of Gout - PCP is following  Labs reviewed with patient   Hx of Herpes - no current outbreak  Back pain - monitor, chronic  Diarrhea - chronic still 4-5 times per day  Hx HPV hx.  Followed by gyn  hx of anal warts/HPV hx  ]  Flu vaccine done  Unvaccinated COVID 19 status    Follow up: 6 months    Time spent today in combination of reviewing patient's chart, medications, labs, pictures when pertinent, provider communication as necessary, counseling patient, care/coordination not otherwise reported here, and patient face to face virtual visit 30 min.   >50% of that time spent counseling and coordination of patient care  Patient was also appropriately counseled on preventive measures such as vaccinations, the importance of annual exam with their PCP, and the importance of health maintenance by dietary and exercise regimens. All questions were answered from an ID perspective and to the best of my ability.         Gypsy Jerry, DO

## 2022-01-07 RX ORDER — LOSARTAN POTASSIUM 50 MG/1
TABLET ORAL
COMMUNITY
Start: 2021-12-17 | End: 2022-02-23 | Stop reason: ALTCHOICE

## 2022-02-01 ENCOUNTER — HOSPITAL ENCOUNTER (EMERGENCY)
Age: 62
Discharge: HOME | End: 2022-02-01
Payer: MEDICARE

## 2022-02-01 ENCOUNTER — TELEPHONE (OUTPATIENT)
Dept: INFECTIOUS DISEASES | Age: 62
End: 2022-02-01

## 2022-02-01 VITALS
BODY MASS INDEX: 30.78 KG/M2 | SYSTOLIC BLOOD PRESSURE: 110 MMHG | HEIGHT: 61 IN | HEART RATE: 83 BPM | DIASTOLIC BLOOD PRESSURE: 63 MMHG | OXYGEN SATURATION: 95 % | WEIGHT: 163 LBS | RESPIRATION RATE: 18 BRPM | TEMPERATURE: 98.3 F

## 2022-02-01 DIAGNOSIS — R53.83 FATIGUE, UNSPECIFIED TYPE: Primary | ICD-10-CM

## 2022-02-01 DIAGNOSIS — F41.9 ANXIETY: ICD-10-CM

## 2022-02-01 LAB
ALBUMIN SERPL-MCNC: 4.2 G/DL (ref 3.5–4.6)
ALP BLD-CCNC: 101 U/L (ref 40–130)
ALT SERPL-CCNC: 22 U/L (ref 0–33)
ANION GAP SERPL CALCULATED.3IONS-SCNC: 14 MEQ/L (ref 9–15)
AST SERPL-CCNC: 19 U/L (ref 0–35)
BASOPHILS ABSOLUTE: 0 K/UL (ref 0–0.2)
BASOPHILS RELATIVE PERCENT: 0.5 %
BILIRUB SERPL-MCNC: 0.3 MG/DL (ref 0.2–0.7)
BUN BLDV-MCNC: 11 MG/DL (ref 8–23)
CALCIUM SERPL-MCNC: 9 MG/DL (ref 8.5–9.9)
CHLORIDE BLD-SCNC: 100 MEQ/L (ref 95–107)
CO2: 26 MEQ/L (ref 20–31)
CREAT SERPL-MCNC: 0.74 MG/DL (ref 0.5–0.9)
EOSINOPHILS ABSOLUTE: 0 K/UL (ref 0–0.7)
EOSINOPHILS RELATIVE PERCENT: 0.3 %
GFR AFRICAN AMERICAN: >60
GFR NON-AFRICAN AMERICAN: >60
GLOBULIN: 2.9 G/DL (ref 2.3–3.5)
GLUCOSE BLD-MCNC: 134 MG/DL (ref 70–99)
HCT VFR BLD CALC: 44.9 % (ref 37–47)
HEMOGLOBIN: 14.9 G/DL (ref 12–16)
LYMPHOCYTES ABSOLUTE: 1 K/UL (ref 1–4.8)
LYMPHOCYTES RELATIVE PERCENT: 16.2 %
MCH RBC QN AUTO: 30.8 PG (ref 27–31.3)
MCHC RBC AUTO-ENTMCNC: 33.2 % (ref 33–37)
MCV RBC AUTO: 92.9 FL (ref 82–100)
MONOCYTES ABSOLUTE: 0.3 K/UL (ref 0.2–0.8)
MONOCYTES RELATIVE PERCENT: 5.3 %
NEUTROPHILS ABSOLUTE: 4.8 K/UL (ref 1.4–6.5)
NEUTROPHILS RELATIVE PERCENT: 77.7 %
PDW BLD-RTO: 14.2 % (ref 11.5–14.5)
PLATELET # BLD: 206 K/UL (ref 130–400)
POTASSIUM SERPL-SCNC: 3.9 MEQ/L (ref 3.4–4.9)
RBC # BLD: 4.83 M/UL (ref 4.2–5.4)
SARS-COV-2, NAAT: NOT DETECTED
SODIUM BLD-SCNC: 140 MEQ/L (ref 135–144)
TOTAL PROTEIN: 7.1 G/DL (ref 6.3–8)
WBC # BLD: 6.2 K/UL (ref 4.8–10.8)

## 2022-02-01 PROCEDURE — 99285 EMERGENCY DEPT VISIT HI MDM: CPT

## 2022-02-01 PROCEDURE — 96374 THER/PROPH/DIAG INJ IV PUSH: CPT

## 2022-02-01 PROCEDURE — 87635 SARS-COV-2 COVID-19 AMP PRB: CPT

## 2022-02-01 PROCEDURE — 80053 COMPREHEN METABOLIC PANEL: CPT

## 2022-02-01 PROCEDURE — 2580000003 HC RX 258: Performed by: PHYSICIAN ASSISTANT

## 2022-02-01 PROCEDURE — 36415 COLL VENOUS BLD VENIPUNCTURE: CPT

## 2022-02-01 PROCEDURE — 85025 COMPLETE CBC W/AUTO DIFF WBC: CPT

## 2022-02-01 PROCEDURE — 6360000002 HC RX W HCPCS: Performed by: PHYSICIAN ASSISTANT

## 2022-02-01 RX ORDER — 0.9 % SODIUM CHLORIDE 0.9 %
1000 INTRAVENOUS SOLUTION INTRAVENOUS ONCE
Status: COMPLETED | OUTPATIENT
Start: 2022-02-01 | End: 2022-02-01

## 2022-02-01 RX ORDER — ONDANSETRON 2 MG/ML
4 INJECTION INTRAMUSCULAR; INTRAVENOUS ONCE
Status: COMPLETED | OUTPATIENT
Start: 2022-02-01 | End: 2022-02-01

## 2022-02-01 RX ADMIN — SODIUM CHLORIDE 1000 ML: 9 INJECTION, SOLUTION INTRAVENOUS at 10:22

## 2022-02-01 RX ADMIN — ONDANSETRON 4 MG: 2 INJECTION INTRAMUSCULAR; INTRAVENOUS at 10:23

## 2022-02-01 ASSESSMENT — ENCOUNTER SYMPTOMS
COLOR CHANGE: 0
SHORTNESS OF BREATH: 0
NAUSEA: 1
SORE THROAT: 0
ABDOMINAL PAIN: 0
EYE DISCHARGE: 0
RHINORRHEA: 0
CONSTIPATION: 0
ABDOMINAL DISTENTION: 0

## 2022-02-01 NOTE — ED PROVIDER NOTES
[address] [facility] ED  eMERGENCY dEPARTMENT eNCOUnter      Pt Name: Marilin Melendez  MRN: [id number]  [place] 1960  Date of evaluation: 2/1/2022  Provider: Christiano Jj PA-C    CHIEF COMPLAINT       Chief Complaint   Patient presents with    Anxiety         HISTORY OF PRESENT ILLNESS   (Location/Symptom, Timing/Onset,Context/Setting, Quality, Duration, Modifying Factors, Severity)  Note limiting factors. Marilin Melendez is a 64 y.o. female who presents to the emergency department complaint of body aches, chills, nausea, which patient states started for her this morning. Patient states she was in a grocery store the symptoms all came on all of a sudden, she does not know whether she is ill, or if she is dealing with her anxiety. She denies any cough, no shortness of breath, mild dizziness. Patient states she has not been vaccinated for COVID-19, she is not been around anybody directly known to have Covid. She states prior to this morning she was not having any issues. Past medical history significant for HIV, anemia, anxiety, bipolar, COPD, hypertension, hyperlipidemia, irritable bowel, migraines. Patient has no pain at time my evaluation, 0-10    HPI    NursingNotes were reviewed. REVIEW OF SYSTEMS    (2-9 systems for level 4, 10 or more for level 5)     Review of Systems   Constitutional: Positive for fatigue. Negative for activity change and appetite change. HENT: Negative for congestion, ear discharge, ear pain, nosebleeds, rhinorrhea and sore throat. Eyes: Negative for discharge. Respiratory: Negative for shortness of breath. Cardiovascular: Negative for chest pain, palpitations and leg swelling. Gastrointestinal: Positive for nausea. Negative for abdominal distention, abdominal pain and constipation. Genitourinary: Negative for difficulty urinating and dysuria. Musculoskeletal: Positive for myalgias. Negative for arthralgias. Skin: Negative for color change. Neurological: Positive for dizziness. Negative for tremors, syncope, weakness, numbness and headaches. Psychiatric/Behavioral: Negative for agitation and confusion. Except as noted above the remainder of the review of systems was reviewed and negative. PAST MEDICAL HISTORY     Past Medical History:   Diagnosis Date    Anemia     Ankle fracture, right     in the past    Anxiety     Benign breast disease     right    Bipolar disorder ([facility]     Carpal tunnel syndrome     Cervicalgia     and cervical disc herniation (C4-C5 level) leading to right arm pain    Chronic cough     COPD (chronic obstructive pulmonary disease) (HCC)     at small airway level (PFT's with negative methacholine challenge test:  01/2009)    Elevated BP     Fracture of left clavicle     as a child    HIV (human immunodeficiency virus infection) ([facility] 09/2017    Hyperlipidemia     Irritable bowel syndrome     Menorrhagia     Migraine     Panic attacks     PCOS (polycystic ovarian syndrome)     Right shoulder pain          SURGICALHISTORY       Past Surgical History:   Procedure Laterality Date    BREAST SURGERY Right     CARPAL TUNNEL RELEASE      right    HEMORRHOID SURGERY      WRIST SURGERY      left         CURRENT MEDICATIONS       Previous Medications    CLONAZEPAM (KLONOPIN) 0.5 MG TABLET    Take 0.25 mg by mouth 2 times daily as needed.      CYANOCOBALAMIN 1000 MCG TABLET    Take 1,000 mcg by mouth    ERGOCALCIFEROL (ERGOCALCIFEROL) 1.25 MG (08651 UT) CAPSULE    Take 50,000 Units by mouth once a week    FAMOTIDINE (PEPCID) 20 MG TABLET    Take 20 mg by mouth daily     GENVOYA [phone] MG TABLET    TAKE ONE TABLET BY MOUTH EVERY DAY    IBUPROFEN (ADVIL;MOTRIN) 600 MG TABLET    Take 600 mg by mouth    LOPERAMIDE (LOPERAMIDE A-D) 2 MG TABLET    Take 0.5 tablets by mouth    LOSARTAN (COZAAR) 25 MG TABLET    Take 25 mg by mouth daily    LOSARTAN (COZAAR) 50 MG TABLET        MULTIPLE VITAMIN (MULTIVITAMIN PO)    Take 1 tablet by mouth daily. PERMETHRIN (ELIMITE) 5 % CREAM    Apply cream to entire body x10 hours. PSYLLIUM (METAMUCIL) 0.36 G CAPS    Take by mouth daily    ROSUVASTATIN (CRESTOR) 5 MG TABLET    5 mg every other day     TRIAMCINOLONE (KENALOG) 0.1 % CREAM    Apply topically 2 times daily    UREA (CARMOL) 40 % CREAM    Apply topically    VALACYCLOVIR (VALTREX) 500 MG TABLET    Take 1 tablet by mouth daily       ALLERGIES     Amoxicillin, Clobetasol, Doxycycline monohydrate, Fish oil, Fluconazole, Paroxetine, Pcn [penicillins], and Statins    FAMILY HISTORY       Family History   Problem Relation Age of Onset    Cancer Mother         breast    Breast Cancer Mother     Heart Disease Brother 48    Diabetes Brother     Cancer Maternal Aunt         breast    Hypertension Father     Liver Disease Father     Cancer Maternal Aunt         skin    Stroke Sister     Breast Cancer Maternal Aunt           SOCIAL HISTORY       Social History     Socioeconomic History    Marital status:      Spouse name: None    Number of children: None    Years of education: None    Highest education level: None   Occupational History    None   Tobacco Use    Smoking status: Never Smoker    Smokeless tobacco: Never Used   Vaping Use    Vaping Use: Never used   Substance and Sexual Activity    Alcohol use: Yes     Alcohol/week: 0.0 standard drinks     Comment: occasional    Drug use: No    Sexual activity: Not Currently   Other Topics Concern    None   Social History Narrative    None     Social Determinants of Health     Financial Resource Strain:     Difficulty of Paying Living Expenses: Not on file   Food Insecurity:     Worried About Running Out of Food in the Last Year: Not on file    Christopher of Food in the Last Year: Not on file   Transportation Needs:     Lack of Transportation (Medical): Not on file    Lack of Transportation (Non-Medical):  Not on file   Physical Activity:  Days of Exercise per Week: Not on file    Minutes of Exercise per Session: Not on file   Stress:     Feeling of Stress : Not on file   Social Connections:     Frequency of Communication with Friends and Family: Not on file    Frequency of Social Gatherings with Friends and Family: Not on file    Attends Hinduism Services: Not on file    Active Member of [address] [place] [address] [employer] or Organizations: Not on file    Attends Club or Organization Meetings: Not on file    Marital Status: Not on file   Intimate Partner Violence:     Fear of Current or Ex-Partner: Not on file    Emotionally Abused: Not on file    Physically Abused: Not on file    Sexually Abused: Not on file   Housing Stability:     Unable to Pay for Housing in the Last Year: Not on file    Number of [place] in the Last Year: Not on file    Unstable Housing in the Last Year: Not on file       SCREENINGS    [place] Coma Scale  Eye Opening: Spontaneous  Best Verbal Response: Oriented  Best Motor Response: Obeys commands  Parag Coma Scale Score: 15 @FLOW([id number])@      PHYSICAL EXAM    (up to 7 for level 4, 8 or more for level 5)     ED Triage Vitals [02/01/22 0932]   BP Temp Temp Source Pulse Resp SpO2 Height Weight   (!) 159/87 98.3 °F (36.8 °C) Oral 98 20 98 % 5' 1\" (1.549 m) 163 lb (73.9 kg)       Physical Exam  Vitals and nursing note reviewed. Constitutional:       General: She is not in acute distress. Appearance: She is well-developed. She is not ill-appearing, toxic-appearing or diaphoretic. Comments: Patient looks well, nontoxic appearance   HENT:      Head: Normocephalic. Right Ear: Tympanic membrane normal.      Left Ear: Tympanic membrane normal.      Nose: No congestion. Mouth/Throat:      Mouth: Mucous membranes are moist.      Pharynx: No oropharyngeal exudate or posterior oropharyngeal erythema. Eyes:      Extraocular Movements: Extraocular movements intact.       Conjunctiva/sclera: Conjunctivae normal. Pupils: Pupils are equal, round, and reactive to light. Neck:      Vascular: No JVD. Trachea: No tracheal deviation. Cardiovascular:      Rate and Rhythm: Normal rate. Pulses: Normal pulses. Heart sounds: Normal heart sounds. No murmur heard. No friction rub. No gallop. Pulmonary:      Effort: Pulmonary effort is normal. No tachypnea, accessory muscle usage, respiratory distress or retractions. Breath sounds: No stridor. No wheezing, rhonchi or rales. Chest:      Chest wall: No tenderness. Abdominal:      General: Abdomen is flat. Bowel sounds are normal. There is no distension or abdominal bruit. Palpations: There is no shifting dullness, fluid wave, hepatomegaly, splenomegaly, mass or pulsatile mass. Tenderness: There is no abdominal tenderness. There is no right CVA tenderness, left CVA tenderness, guarding or rebound. Negative signs include Jackson's sign, Rovsing's sign and McBurney's sign. Comments: Abdomen soft nondistended nontender no guarding mass rebound no CVA tenderness. Musculoskeletal:         General: No deformity. Normal range of motion. Cervical back: Normal range of motion and neck supple. No rigidity. Skin:     General: Skin is warm and dry. Capillary Refill: Capillary refill takes less than 2 seconds. Coloration: Skin is not jaundiced. Neurological:      General: No focal deficit present. Mental Status: She is alert and oriented to person, place, and time. Mental status is at baseline. Cranial Nerves: No cranial nerve deficit. Sensory: No sensory deficit. Motor: No weakness.       Coordination: Coordination normal.   Psychiatric:         Mood and Affect: Mood normal.         DIAGNOSTIC RESULTS     EKG: All EKG's are interpreted by the Emergency Department Physician who either signs or Co-signsthis chart in the absence of a cardiologist.        RADIOLOGY:   Susu Quyen such as CT, Ultrasound and MRI are read by the radiologist. Richard Layton radiographic images are visualized and preliminarily interpreted by the emergency physician with the below findings:        Interpretation per the Radiologist below, if available at the time ofthis note:    No orders to display         ED BEDSIDE ULTRASOUND:   Performed by ED Physician - none    LABS:  Labs Reviewed   COMPREHENSIVE METABOLIC PANEL - Abnormal; Notable for the following components:       Result Value    Glucose 134 (*)     All other components within normal limits   COVID-19, RAPID   CBC WITH AUTO DIFFERENTIAL       All other labs were within normal range or not returned as of this dictation. EMERGENCY DEPARTMENT COURSE and DIFFERENTIAL DIAGNOSIS/MDM:   Vitals:    Vitals:    02/01/22 0932 02/01/22 1115   BP: (!) 159/87 110/63   Pulse: 98 83   Resp: 20 18   Temp: 98.3 °F (36.8 °C)    TempSrc: Oral    SpO2: 98% 95%   Weight: 163 lb (73.9 kg)    Height: 5' 1\" (1.549 m)           MDM  Number of Diagnoses or Management Options  Anxiety  Fatigue, unspecified type  Diagnosis management comments: Patient presents to ED with complaint of body aches chills, nausea, the started this morning for her, she states recently whenever she walks into a grocery store, or there is a lot of people she gets very anxious, and begins having symptoms, she does have past history of anxiety, does not know if she is seeing worsening of her anxiety. She has no chest pain or shortness of breath. On arrival to the ED, she states her symptoms are now resolved. Her studies today in the ED show no specific acute findings, she is negative for Covid, as she is concerned and over anxious about obtaining Covid, even though she has not currently been vaccinated. I advised her today that her studies show no specific acute findings, her diagnosis can be generalized fatigue, anxiety. She is advised to contact her family physician for follow-up.   If she has any worsening or change symptoms, she was advised to return to the ED. CRITICAL CARE TIME   Total Critical Care time was 0 minutes, excluding separately reportableprocedures. There was a high probability of clinicallysignificant/life threatening deterioration in the patient's condition which required my urgent intervention. CONSULTS:  None    PROCEDURES:  Unless otherwise noted below, none     Procedures    FINAL IMPRESSION      1. Fatigue, unspecified type    2.  Anxiety          DISPOSITION/PLAN   DISPOSITION Decision To Discharge 02/01/2022 11:45:09 AM      PATIENT REFERRED TO:  Anmol Sharma MD  [address] [facility] [address]  [phone]    In 3 days        DISCHARGE MEDICATIONS:  New Prescriptions    No medications on file          (Please note that portions of this note were completed with a voice recognition program.  Efforts were made to edit the dictations but occasionally words are mis-transcribed.)    Silas Galindo PA-C (electronically signed)  Attending Emergency Physician         Silas Galindo PA-C  02/01/22 1143

## 2022-02-01 NOTE — ED NOTES
Bed: 15  Expected date: 2/1/22  Expected time:   Means of arrival:   Comments:  64year old female with panic attack x 2 days.  Fatigued today [address] 194/90, 100% ra, 20 April DA Joe RN  02/01/22 0949

## 2022-02-01 NOTE — TELEPHONE ENCOUNTER
Pt called in with updates. Stated she went to ER. \"couldn't take it, felt like my whole body was drained and I was so anxious. \"     Provided active listening as pt discussed stressors. Discussed medications and possible need for mental health follow up due to increased anxiety. Encouraged follow up.

## 2022-02-02 ENCOUNTER — TELEPHONE (OUTPATIENT)
Dept: INFECTIOUS DISEASES | Age: 62
End: 2022-02-02

## 2022-02-02 NOTE — TELEPHONE ENCOUNTER
CM attempted to reach pt for 1x1 via phone. CM called at 10:05AM and 10:15AM, leving messages both times. Cm asked rN to text pt.   CM will reschedule per request.

## 2022-02-07 ENCOUNTER — TELEPHONE (OUTPATIENT)
Dept: INFECTIOUS DISEASES | Age: 62
End: 2022-02-07

## 2022-02-07 NOTE — TELEPHONE ENCOUNTER
CM called pt for follow up. Pt stated she is a bit dizzy. Stated she may have vertigo. Pt tried flushing ears with hydrogen peroxide, did not help. Pt does have appointment with PCP 2/8/22. Pt stated she had a \"good day\" yesterday, kept busy with cleaning and cooking. CM provided active lstening and support. CM will follow up with pt as needed.

## 2022-02-14 ENCOUNTER — NURSE ONLY (OUTPATIENT)
Dept: INFECTIOUS DISEASES | Age: 62
End: 2022-02-14

## 2022-02-14 NOTE — PROGRESS NOTES
Cm called pt for scheduled 1x1. Pt discussed vertigo, son being admitted and plans to move into assisted living. With regards to wanting to move into an assisted living facility, pt stated she no longer would like to live alone due to health concerns. CM provided active listening and support. CM will follow up with pt as needed.

## 2022-02-23 PROBLEM — I10 HYPERTENSION, ESSENTIAL: Status: ACTIVE | Noted: 2021-06-28

## 2022-02-23 PROBLEM — R73.03 PREDIABETES: Status: ACTIVE | Noted: 2021-06-28

## 2022-02-23 PROBLEM — H81.10 BPPV (BENIGN PAROXYSMAL POSITIONAL VERTIGO), UNSPECIFIED LATERALITY: Status: ACTIVE | Noted: 2022-02-08

## 2022-02-23 PROBLEM — M65.4 DE QUERVAIN'S TENOSYNOVITIS, RIGHT: Status: ACTIVE | Noted: 2021-05-18

## 2022-03-11 DIAGNOSIS — E55.9 VITAMIN D DEFICIENCY: ICD-10-CM

## 2022-03-11 DIAGNOSIS — B20 HUMAN IMMUNODEFICIENCY VIRUS (HCC): Primary | ICD-10-CM

## 2022-03-17 ENCOUNTER — TELEPHONE (OUTPATIENT)
Dept: INFECTIOUS DISEASES | Age: 62
End: 2022-03-17

## 2022-03-17 NOTE — TELEPHONE ENCOUNTER
CM attempted to call pt 3xs for scheduled phne appointment, 11:00AM, 11:25AM and 11:30AM.  CM left VMs each time. CM did request pt to call to reschedule appointment.

## 2022-03-21 ENCOUNTER — NURSE ONLY (OUTPATIENT)
Dept: INFECTIOUS DISEASES | Age: 62
End: 2022-03-21

## 2022-03-21 NOTE — PROGRESS NOTES
Re-Assessment      Patient ID:   Ashley Gamble  Date:   3/21/2022      Client ID:  [id number]    [address]  [phone] (home)     Family/House:    [] Partner  [] Children  [] Other -     Mental Health:   Hx of MH    Substance Abuse:   NA  Social History     Socioeconomic History    Marital status:      Spouse name: Not on file    Number of children: Not on file    Years of education: Not on file    Highest education level: Not on file   Occupational History    Not on file   Tobacco Use    Smoking status: Never Smoker    Smokeless tobacco: Never Used   Vaping Use    Vaping Use: Never used   Substance and Sexual Activity    Alcohol use: Yes     Alcohol/week: 0.0 standard drinks     Comment: occasional    Drug use: No    Sexual activity: Not Currently   Other Topics Concern    Not on file   Social History Narrative    Not on file     Social Determinants of Health     Financial Resource Strain:     Difficulty of Paying Living Expenses: Not on file   Food Insecurity:     Worried About Running Out of Food in the Last Year: Not on file    Christopher of Food in the Last Year: Not on file   Transportation Needs:     Lack of Transportation (Medical): Not on file    Lack of Transportation (Non-Medical):  Not on file   Physical Activity:     Days of Exercise per Week: Not on file    Minutes of Exercise per Session: Not on file   Stress:     Feeling of Stress : Not on file   Social Connections:     Frequency of Communication with Friends and Family: Not on file    Frequency of Social Gatherings with Friends and Family: Not on file    Attends Alevism Services: Not on file    Active Member of Clubs or Organizations: Not on file    Attends Club or Organization Meetings: Not on file    Marital Status: Not on file   Intimate Partner Violence:     Fear of Current or Ex-Partner: Not on file    Emotionally Abused: Not on file    Physically Abused: Not on file   Brandy Marin Sexually Abused: Not on file   Housing Stability:     Unable to Pay for Housing in the Last Year: Not on file    Number of Places Lived in the Last Year: Not on file    Unstable Housing in the Last Year: Not on file       Housing:   apartment    Health/Healthcare:  HIV Stable   Physician Visit:  Dr. Nataliia Dunne 12/21   Dental Visit:  CM encouraged follow up    CD4:   Lab Results   Component Value Date    LABABSO 708 10/27/2021       Viral Load:  Lab Results   Component Value Date    [id number] <20 05/30/2018    [id number] Not Detected 10/27/2021       Medical Insurance:  Payor: Kailey Mccollum / Plan: Redia Coho / Product Type: *No Product type* /    OHDAP/HIPSCA:  NA   Renewal Date:  NA     Income:    SS    Legal Issues:    NA    Emergency Contact:   Extended Emergency Contact Information  Primary Emergency Contact: [address] of [address] [place] [address] Phone: [phone]  Relation: Child  Appointment completed via phone. CM called pt for scheduled appointment. CM completed Annual Review, updated RW Part A Eligibility for services. Pt receives Social Security, has Medicare Part C.  CM reviewed [facility] Policy, Sliding Scale Fee with pt. CM completed PSA and SA/MH Assessments. Pt has hx of MH, no hx of SA. CM updated ISP, pt agreed with POC. Pt remains compliant with HIV Care. Viral load is undetectable. Pt has not had a dental visit since Pandemic. Pt stated she will make appointment with dentist.  CM encouraged follow up. Pt is not sexually active, is aware of U=U, undetectable equals un-transmittable. CM provided socialization. Pt discussed feelings of loneliness since spouse passed. Recently, son, who lived downstairs from her, moved. CM provided active listening and support. Pt also discussed continued issues with vertigo. CM will follow up with pt as needed.

## 2022-03-23 ENCOUNTER — TELEPHONE (OUTPATIENT)
Dept: INFECTIOUS DISEASES | Age: 62
End: 2022-03-23

## 2022-03-23 NOTE — TELEPHONE ENCOUNTER
Patient called in wanting to talk. Just found out her grandson was admitted to hospital. She is very concerned. provided active listening as pt discussed fears regarding child sickness. Has been having issues with ears and throat. She is following with PCP for ENT referral.   Upcoming dental apt 4.27.22  Discussed multiple issues non specific.

## 2022-04-21 ENCOUNTER — TELEPHONE (OUTPATIENT)
Dept: INFECTIOUS DISEASES | Age: 62
End: 2022-04-21

## 2022-04-21 NOTE — TELEPHONE ENCOUNTER
Pt called in with update  She was able to go to dentist. Dr Milton Atkins. Goes back 5.3.22 for filling  Housing concerns as there has been a change of . Provided active listening as pt discussed multiple life issues. Doing ok otherwise. No issues with [facility].

## 2022-05-02 ENCOUNTER — TELEPHONE (OUTPATIENT)
Dept: INFECTIOUS DISEASES | Age: 62
End: 2022-05-02

## 2022-05-02 RX ORDER — FLUTICASONE PROPIONATE 50 MCG
SPRAY, SUSPENSION (ML) NASAL
COMMUNITY
Start: 2022-02-22

## 2022-05-02 NOTE — TELEPHONE ENCOUNTER
Pt called in wanting to talk. Providing active listening as pt discussed multiple issues non specific. Reviewed medications. Recent dry mouth. Allergies per PCP. No ART issues.   Will call office if needed,

## 2022-05-13 ENCOUNTER — TELEPHONE (OUTPATIENT)
Dept: INFECTIOUS DISEASES | Age: 62
End: 2022-05-13

## 2022-05-13 NOTE — TELEPHONE ENCOUNTER
Pt called in wanting to talk. Doing ok. Taking meds as directed. Asked \"how do I know if my HIV is getting worse\"   Education reviewed with pt regarding CD 4 and undetectable VL. She verbalized understanding. provided active listening as pt discussed multiple topics.    1x1 scheduled for 5.23.22

## 2022-05-26 ENCOUNTER — TELEPHONE (OUTPATIENT)
Dept: INFECTIOUS DISEASES | Age: 62
End: 2022-05-26

## 2022-05-26 NOTE — TELEPHONE ENCOUNTER
Pt called, needed someone with whom to talk. Pt upset over son's and nephew's health as a result of alcohol use. Per pt., son went to ER because of stomach problems and nephew recently had seizures. CM provided active listening and support. Pt also complained of pressure in ear and hearing loss and dry mouth, both she attributes to medications. Pt informed CM that [facility] will link pt with a  who will be active in pt's medical care and will visit weekly. Pt stated she does not have many friends on whom she can rely. CM provided support and encouragement. CM will follow up with pt as needed.

## 2022-05-31 ENCOUNTER — TELEPHONE (OUTPATIENT)
Dept: INFECTIOUS DISEASES | Age: 62
End: 2022-05-31

## 2022-06-06 ENCOUNTER — TELEPHONE (OUTPATIENT)
Dept: INFECTIOUS DISEASES | Age: 62
End: 2022-06-06

## 2022-06-06 NOTE — TELEPHONE ENCOUNTER
Dx Carpal Tunnel Syndrome (CTS) . Had questions. Reviewed and education provided. Reviewed all upcoming apt. CM apt 6.13.22  provided active listening as pt discussed multiple non specific issues. provided support. discussed support group-- still having issues around a lot of people.  Will think about it

## 2022-06-10 RX ORDER — ELVITEGRAVIR, COBICISTAT, EMTRICITABINE, AND TENOFOVIR ALAFENAMIDE 150; 150; 200; 10 MG/1; MG/1; MG/1; MG/1
TABLET ORAL
Qty: 90 TABLET | Refills: 1 | Status: SHIPPED | OUTPATIENT
Start: 2022-06-10

## 2022-06-13 ENCOUNTER — NURSE ONLY (OUTPATIENT)
Dept: INFECTIOUS DISEASES | Age: 62
End: 2022-06-13

## 2022-06-13 DIAGNOSIS — E55.9 VITAMIN D DEFICIENCY: ICD-10-CM

## 2022-06-13 DIAGNOSIS — B20 HUMAN IMMUNODEFICIENCY VIRUS (HCC): ICD-10-CM

## 2022-06-13 NOTE — PROGRESS NOTES
CM called pt for scheduled 1x1. Pt discussed health concerns for son and nephew. CM provded active listening and support. Pt also discussed loneliness  She often feels. Cm and pt discussed ways she can meet other people, such as Sabianism, volunteer work. Pt has live mireles for dating site. Pt will consider other avenues. Pt did express insecurities due to dyslexia, concerned she may not be able to converse in a manner that is considered \"advance\". Cm provided support and encouragement. CM scheduled next appointment on 6/27/22 @ 1PM.  CM will follow up with pt as needed.

## 2022-06-14 LAB
ABSOLUTE CD 4 HELPER: 939 CELLS/UL (ref 430–1800)
CD4 % HELPER T CELL: 58 % (ref 32–64)
HEPATITIS C ANTIBODY: NONREACTIVE
LYMPHOCYTE SUBSET PANEL 2 INFO: NORMAL
RPR: NORMAL
VITAMIN D 25-HYDROXY: 33.5 NG/ML

## 2022-06-18 LAB
C. TRACHOMATIS DNA ,URINE: NEGATIVE
N. GONORRHOEAE DNA, URINE: NEGATIVE

## 2022-06-24 DIAGNOSIS — B20 HUMAN IMMUNODEFICIENCY VIRUS (HCC): Primary | ICD-10-CM

## 2022-06-27 ENCOUNTER — TELEPHONE (OUTPATIENT)
Dept: INFECTIOUS DISEASES | Age: 62
End: 2022-06-27

## 2022-06-27 NOTE — TELEPHONE ENCOUNTER
Call placed to pt. No answer. Both, home and mobile numbers were called. VM left for return call to office.

## 2022-07-01 ENCOUNTER — TELEPHONE (OUTPATIENT)
Dept: INFECTIOUS DISEASES | Age: 62
End: 2022-07-01

## 2022-07-01 DIAGNOSIS — E78.2 HYPERLIPIDEMIA, MIXED: Primary | ICD-10-CM

## 2022-07-01 DIAGNOSIS — B20 HUMAN IMMUNODEFICIENCY VIRUS (HCC): ICD-10-CM

## 2022-07-01 LAB
ALBUMIN SERPL-MCNC: 4.4 G/DL (ref 3.5–4.6)
ALP BLD-CCNC: 74 U/L (ref 40–130)
ALT SERPL-CCNC: 84 U/L (ref 0–33)
ANION GAP SERPL CALCULATED.3IONS-SCNC: 13 MEQ/L (ref 9–15)
AST SERPL-CCNC: 64 U/L (ref 0–35)
BILIRUB SERPL-MCNC: 0.4 MG/DL (ref 0.2–0.7)
BUN BLDV-MCNC: 13 MG/DL (ref 8–23)
CALCIUM SERPL-MCNC: 8.7 MG/DL (ref 8.5–9.9)
CHLORIDE BLD-SCNC: 99 MEQ/L (ref 95–107)
CO2: 27 MEQ/L (ref 20–31)
CREAT SERPL-MCNC: 0.78 MG/DL (ref 0.5–0.9)
GFR AFRICAN AMERICAN: >60
GFR NON-AFRICAN AMERICAN: >60
GLOBULIN: 2.4 G/DL (ref 2.3–3.5)
GLUCOSE BLD-MCNC: 121 MG/DL (ref 70–99)
HCT VFR BLD CALC: 42.8 % (ref 37–47)
HEMOGLOBIN: 14.4 G/DL (ref 12–16)
MCH RBC QN AUTO: 31.8 PG (ref 27–31.3)
MCHC RBC AUTO-ENTMCNC: 33.7 % (ref 33–37)
MCV RBC AUTO: 94.2 FL (ref 82–100)
PDW BLD-RTO: 14.2 % (ref 11.5–14.5)
PLATELET # BLD: 203 K/UL (ref 130–400)
POTASSIUM SERPL-SCNC: 4 MEQ/L (ref 3.4–4.9)
RBC # BLD: 4.54 M/UL (ref 4.2–5.4)
SODIUM BLD-SCNC: 139 MEQ/L (ref 135–144)
TOTAL PROTEIN: 6.8 G/DL (ref 6.3–8)
WBC # BLD: 5.6 K/UL (ref 4.8–10.8)

## 2022-07-01 NOTE — TELEPHONE ENCOUNTER
Pt here today for labs  Doing ok. Has been having hearing issues. Following up with PCP  [facility] for mental health. She is working on getting a CM with [facility] as well. No issues with [facility]  Lipid panel added ok per Dr Ortiz De Leon.

## 2022-07-04 LAB
QUANTI TB GOLD PLUS: NEGATIVE
QUANTI TB1 MINUS NIL: 0 IU/ML (ref 0–0.34)
QUANTI TB2 MINUS NIL: 0 IU/ML (ref 0–0.34)
QUANTIFERON MITOGEN: 2.98 IU/ML
QUANTIFERON NIL: 0.01 IU/ML

## 2022-07-05 ENCOUNTER — TELEPHONE (OUTPATIENT)
Dept: INFECTIOUS DISEASES | Age: 62
End: 2022-07-05

## 2022-07-05 NOTE — TELEPHONE ENCOUNTER
Missed apt and/or could not connect. Forgot about apt.   R/S to 7.13.22 in person per pt request.   Doing ok otherwise.    Will call if needed

## 2022-07-05 NOTE — TELEPHONE ENCOUNTER
Called 3 x LMOVM to return my call in the office. To Reschedule an Appointment . [facility] 7/5/2022

## 2022-07-13 ENCOUNTER — OFFICE VISIT (OUTPATIENT)
Dept: INFECTIOUS DISEASES | Age: 62
End: 2022-07-13
Payer: MEDICARE

## 2022-07-13 VITALS
DIASTOLIC BLOOD PRESSURE: 74 MMHG | SYSTOLIC BLOOD PRESSURE: 110 MMHG | TEMPERATURE: 97 F | WEIGHT: 155 LBS | HEART RATE: 68 BPM | BODY MASS INDEX: 29.29 KG/M2

## 2022-07-13 DIAGNOSIS — H91.90 HEARING DIFFICULTY, UNSPECIFIED LATERALITY: ICD-10-CM

## 2022-07-13 DIAGNOSIS — B20 HUMAN IMMUNODEFICIENCY VIRUS (HCC): Primary | ICD-10-CM

## 2022-07-13 PROCEDURE — G8427 DOCREV CUR MEDS BY ELIG CLIN: HCPCS | Performed by: INTERNAL MEDICINE

## 2022-07-13 PROCEDURE — G8417 CALC BMI ABV UP PARAM F/U: HCPCS | Performed by: INTERNAL MEDICINE

## 2022-07-13 PROCEDURE — 1036F TOBACCO NON-USER: CPT | Performed by: INTERNAL MEDICINE

## 2022-07-13 PROCEDURE — 3017F COLORECTAL CA SCREEN DOC REV: CPT | Performed by: INTERNAL MEDICINE

## 2022-07-13 PROCEDURE — 99213 OFFICE O/P EST LOW 20 MIN: CPT | Performed by: INTERNAL MEDICINE

## 2022-07-13 NOTE — PROGRESS NOTES
7/13/2022    Patient Name: Elvira Elizalde  YOB: 1960  Patient Sex: female  Medical Record Number: [id number]    Background:  Diagnosed 2017  Symptoms started with UTI  WBCs have been low for years - possibility older infection   passed away. Has had other sexual partners - per patient all negative. No THC and no smoking. Periodic alcohol  No travel out of [place]. Hx of having a cat at home. No TB history  Sexual hx: Had trichomonas in the past. Possible BV. +anal warts history  Has chronic urinary incontinence as discussed previously; hx of hearing impairment. PCN allergy - years ago. Unknown allergic reaction    +hx of gout. No recent attack  Viral load suppressed. Genvoya    Labs reviewed with patient  Has concern over her hearing - looked in the ears - no drainage and no erythema.    General: Patient appears in no acute distress, pleasant and cooperative  Skin: no new rashes  HEENT: EOMI, Neck supple  Heart: S1 S2  Lungs: equal expansion  Abdomen: soft, ND, NTTP, +BS  Extrem:no asymmetry of the upper or lower extremities  Neuro exam: CN II-XII intact          Chemistry        Component Value Date/Time     07/01/2022 1024    K 4.0 07/01/2022 1024    CL 99 07/01/2022 1024    CO2 27 07/01/2022 1024    BUN 13 07/01/2022 1024    CREATININE 0.78 07/01/2022 1024        Component Value Date/Time    CALCIUM 8.7 07/01/2022 1024    ALKPHOS 74 07/01/2022 1024    AST 64 (H) 07/01/2022 1024    ALT 84 (H) 07/01/2022 1024    BILITOT 0.4 07/01/2022 1024          No components found for: CHLPL  Lab Results   Component Value Date    TRIG 300 (H) 04/19/2021    TRIG 213 (H) 05/14/2020    TRIG 175 (H) 06/05/2019     Lab Results   Component Value Date    HDL 41 04/19/2021    HDL 47 05/14/2020    HDL 50 06/05/2019     Lab Results   Component Value Date    LDLCALC 95 04/19/2021    LDLCALC 132 (H) 05/14/2020    LDLCALC 144 (H) 06/05/2019       Impression/Plan  HIV infection - stable  Hearing concern - has ENT appointment coming. Elevated LFTs - mild elevation. Has PCP appointment coming up. Hx of Gout - PCP is following  Labs reviewed with patient   Hx of Herpes - no current outbreak  Back pain - monitor, chronic  Diarrhea - chronic still 4-5 times per day  Hx HPV hx. Followed by gyn  hx of anal warts/HPV hx    Follow up: 6 months    Time spent today in combination of reviewing patient's chart, medications, labs, pictures when pertinent, provider communication as necessary, counseling patient, care/coordination not otherwise reported here, and patient face to face 30 min.   >50% of that time spent counseling and coordination of patient care  Patient was also appropriately counseled on preventive measures such as vaccinations, the importance of annual exam with their PCP, and the importance of health maintenance by dietary and exercise regimens. All questions were answered from an ID perspective and to the best of my ability.         Gypsy Jerry, DO

## 2022-07-13 NOTE — PROGRESS NOTES
Pt here for 6 month b20 f/u. States 100% compliance with medications. Denies any issues at this time. States that she's \"taking life one day at a time\". CD4 >900 VL not detected.

## 2022-07-23 DIAGNOSIS — E78.2 HYPERLIPIDEMIA, MIXED: ICD-10-CM

## 2022-07-23 LAB
CHOLESTEROL, TOTAL: 236 MG/DL (ref 0–199)
HDLC SERPL-MCNC: 49 MG/DL (ref 40–59)
LDL CHOLESTEROL CALCULATED: 160 MG/DL (ref 0–129)
TRIGL SERPL-MCNC: 134 MG/DL (ref 0–150)

## 2022-08-05 ENCOUNTER — TELEPHONE (OUTPATIENT)
Dept: INFECTIOUS DISEASES | Age: 62
End: 2022-08-05

## 2022-08-05 NOTE — TELEPHONE ENCOUNTER
Call placed to pt to discuss up coming support group. Date 8.25.22    Discussed upcoming HIV support group. Pt is unsure about attending. Will think about it and call office if able to attend. Denies any needs at this time. Denies any issues that need Doctor attention.  Will call with concerns

## 2022-08-15 ENCOUNTER — OFFICE VISIT (OUTPATIENT)
Dept: OBGYN CLINIC | Age: 62
End: 2022-08-15
Payer: MEDICARE

## 2022-08-15 VITALS
HEART RATE: 73 BPM | WEIGHT: 156 LBS | HEIGHT: 61 IN | BODY MASS INDEX: 29.45 KG/M2 | DIASTOLIC BLOOD PRESSURE: 76 MMHG | SYSTOLIC BLOOD PRESSURE: 108 MMHG

## 2022-08-15 DIAGNOSIS — Z11.51 SCREENING FOR HPV (HUMAN PAPILLOMAVIRUS): ICD-10-CM

## 2022-08-15 DIAGNOSIS — Z01.419 VISIT FOR GYNECOLOGIC EXAMINATION: Primary | ICD-10-CM

## 2022-08-15 DIAGNOSIS — Z12.31 VISIT FOR SCREENING MAMMOGRAM: ICD-10-CM

## 2022-08-15 PROCEDURE — G0101 CA SCREEN;PELVIC/BREAST EXAM: HCPCS | Performed by: OBSTETRICS & GYNECOLOGY

## 2022-08-15 RX ORDER — LORAZEPAM 1 MG/1
TABLET ORAL
COMMUNITY
Start: 2022-05-27

## 2022-08-15 RX ORDER — TRIAMCINOLONE ACETONIDE 55 UG/1
1 SPRAY, METERED NASAL 2 TIMES DAILY
COMMUNITY
Start: 2022-04-13

## 2022-08-15 RX ORDER — CLINDAMYCIN HYDROCHLORIDE 150 MG/1
CAPSULE ORAL
COMMUNITY
Start: 2022-07-26

## 2022-08-15 NOTE — PROGRESS NOTES
Maternal Aunt      Social History     Socioeconomic History    Marital status:      Spouse name: Not on file    Number of children: Not on file    Years of education: Not on file    Highest education level: Not on file   Occupational History    Not on file   Tobacco Use    Smoking status: Never    Smokeless tobacco: Never   Vaping Use    Vaping Use: Never used   Substance and Sexual Activity    Alcohol use: Yes     Alcohol/week: 0.0 standard drinks     Comment: occasional    Drug use: No    Sexual activity: Not Currently   Other Topics Concern    Not on file   Social History Narrative    Not on file     Social Determinants of Health     Financial Resource Strain: Not on file   Food Insecurity: Not on file   Transportation Needs: Not on file   Physical Activity: Not on file   Stress: Not on file   Social Connections: Not on file   Intimate Partner Violence: Not on file   Housing Stability: Not on file         Last mammogram denies   Breast cancer risk factors : mother 61 yrs. Last Pap last 1 yr ago wnl. Patient's last menstrual period was 05/05/2012. Age at menopause onset: 54yo   Menopausal symptom assessment:  Vasomotor symptoms: occasional   Urinary incontinence &  symptoms: denies   Sexual dysfunction: denies   Present Hormonalmedications: denies     Osteoporosis risk assessment : none   Last bone mineral density : not indicated     History of abnormal lipids: yes - on meds   Hypertension yes - on meds. Stroke/MI denies   HX OF HIV     Yearly flu vaccine recommended for persons aged 48 and older. Colonoscopy indicated . To follow . Review of Systems  Review of Systems  All other systems reviewed and are negative. Review of Systems   Constitutional: Negative for chills and fever. Respiratory: Negative for cough and shortness of breath. Cardiovascular: Negative for chest painand palpitations. Gastrointestinal: Negative for nausea and vomiting.    Genitourinary: Negative for dysuria, frequency and urgency. Musculoskeletal: Negative formyalgias. Neurological: Negative for dizziness, seizures and headaches. Psychiatric/Behavioral: Negative for depression and suicidal ideas.     :     Vitals:  /76   Pulse 73   Ht 5' 1\" (1.549 m)   Wt 156 lb (70.8 kg)   LMP 05/05/2012   BMI 29.48 kg/m²     Physical Exam  Physical Exam    Constitutional: She is oriented to person, place, and time and well-developed, well-nourished, and in nodistress. HENT:   Head: Normocephalic and atraumatic. Eyes: Conjunctivae are normal. Pupils are equal, round, and reactive to light. Neck: Normal range ofmotion. Neck supple. Cardiovascular: Normal rate and regular rhythm. Pulmonary/Chest: Effort normal and breath sounds normal. No respiratory distress. Right breast exhibits noinverted nipple, no mass, no nipple discharge, no skin change and no tenderness. Left breast exhibits no inverted nipple, no mass, no nipple discharge, no skin change and no tenderness. Breasts are symmetrical.   Abdominal: Soft. Bowel sounds are normal.   Genitourinary: Vagina normal, uterus normal and cervix normal. No vaginal discharge found. Musculoskeletal: Normal range ofmotion. Neurological: She is alert and oriented to person, place, and time. She has normal reflexes. Psychiatric: Memory, affect andjudgment normal.       Assessment:      Diagnosis Orders   1. Visit for gynecologic examination  PAP SMEAR      2. Screening for HPV (human papillomavirus)  PAP SMEAR      3. Visit for screening mammogram  LUANNE DIGITAL SCREEN W OR WO CAD BILATERAL          Body mass index is 29.48 kg/m². Obesity:  Class I  Smoking:  No    Plan:   PAP SMEAR : indicated:  performed. Breast exam : Normal   History of stress urinary incontinence, patient counseled about transobturator tape surgery. Patient would like to schedule an appointment for follow-up to discuss treatment options including mentioned procedure.     Obesity Counseling:  Given  Smoking Counseling:N/A    Orders Placed This Encounter   Procedures    LUANNE DIGITAL SCREEN W OR WO CAD BILATERAL     Standing Status:   Future     Standing Expiration Date:   10/12/2023     Order Specific Question:   Reason for exam:     Answer:   screening    PAP SMEAR     Standing Status:   Future     Standing Expiration Date:   8/12/2023     Order Specific Question:   Collection Type     Answer: Thin Prep     Order Specific Question:   Prior Abnormal Pap Test     Answer:   No     Order Specific Question:   Screening or Diagnostic     Answer:   Screening     Order Specific Question:   HPV Requested? Answer:   Yes     Order Specific Question:   High Risk Patient     Answer:   N/A       No orders of the defined types were placed in this encounter. Follow up:  Return for next annual exam visit. Tyra Epley, MD        HEALTH MAINTENANCE:   Health information given. Women's Health patient information and counselling done. regarding risk/benefits/alternatives to Hormone Therapy and need for yearly re-evaluation. Periodic Pap smear discussed. Mammogram recommended yearly. Colon cancer screening by age 48 & every 5-10years. Bone mineral density (by age 72 or sooner if indication it would affect Hormone Therapy management or other risk factors for osteoporosis). Emilie Heath M.D., F.A.C.O. G

## 2022-08-22 ENCOUNTER — TELEPHONE (OUTPATIENT)
Dept: INFECTIOUS DISEASES | Age: 62
End: 2022-08-22

## 2022-08-22 NOTE — TELEPHONE ENCOUNTER
Pt called in with generalized complaints. Feeling down lately, wants someone to help take care of her. Doesn't want to live a lone. Provided active listening. Also feeling achy and drained. She will take home covid test. \"To be safe\"   She will call office with updates.

## 2022-08-26 ENCOUNTER — TELEPHONE (OUTPATIENT)
Dept: INFECTIOUS DISEASES | Age: 62
End: 2022-08-26

## 2022-08-30 LAB — PAP SMEAR: NORMAL

## 2022-09-06 NOTE — PROGRESS NOTES
Pt called in with generl concerns. States she thinks she has thrush. Stated she has looked it up and believes this is what she has. Described as dry mouth, dry thraot. Feels like lump sometimes with swallow. Denies any white patches, also she states she thinks this is related to bowel movment and stomach issues. Reviewed this with Dr Cheyanne Sinclair. Ok to   nystatin (MYCOSTATIN) [id number] UNIT/ML suspension                Take 5 mLs by mouth 4 times daily for 10 days Retain in mouth as long as possible, Oral, 4 TIMES DAILY Starting Tue 9/6/2022, Until Fri 9/16/2022, For 10 days, Disp-200 mL, R-0, Normal     Escribed to pt local pharmacy. VM left for pt to discuss further. She is to follow up with PCP regarding this and stomach/bowel concerns.

## 2022-09-07 ENCOUNTER — NURSE ONLY (OUTPATIENT)
Dept: INFECTIOUS DISEASES | Age: 62
End: 2022-09-07

## 2022-09-07 DIAGNOSIS — B20 HUMAN IMMUNODEFICIENCY VIRUS (HCC): Primary | ICD-10-CM

## 2022-09-21 ENCOUNTER — TELEPHONE (OUTPATIENT)
Dept: INFECTIOUS DISEASES | Age: 62
End: 2022-09-21

## 2022-09-22 ENCOUNTER — TELEPHONE (OUTPATIENT)
Dept: INFECTIOUS DISEASES | Age: 62
End: 2022-09-22

## 2022-09-22 NOTE — TELEPHONE ENCOUNTER
Reviewed with Dr Alyson Diaz. Due to pt symptom onset is possibly 4-5 days Paxlovid can not be given. Hydration  Multivitamin (takes vit d and c)   Tylenol as needed   Call office with any changes, or if symptoms worsen. Call placed back to pt.  VM left for her to call back to discuss further

## 2022-09-22 NOTE — TELEPHONE ENCOUNTER
Spoke with pt. She verbalized understanding as she then stated her symptoms started sat or   Noted in care everywhere that pt discussed these symptoms with PCP      [facility] note: 8.23.22 ( her complaints are the same as below except she has slight cough now)      (Wang Rodriguez called today. : 1960  Reason for call: Patient stated the last month she has been feeling off. Very fatigue and sleeps most the morning away. Back of the throat and mouth is dry. Patient bowel movements are a hit or miss. Pressure in left leg and sometimes in right wrist. Blood pressure was 108/77. Weight has been up and down from 156-150lbs. Took Covid test, but was negative. Possibly looking for home care to come over. Patient wanted to let Dr. Vania Boucher what has been going on lately. Please call patient to advise. Detailed message is okay. Thank you,   Jumana Leal)        Discussed Dr Bret whitten as well. No paxlovid ---(symptom onset > 5 days and interacts with Tyrese Monday)   She verbalized understanding   Will continue naproxen for aches. Taking allergy/cold medications. -- states this seems to help some with some nasal congestion. Denies feeling like her chest is congested and denies any SOB. Denies HA. Chronic GI issues. No rashes/eye concerns   Will stay hydrated. Call if anything changes or condition worsens    Pt verbalized understanding of POC. Denies concerns regarding POC. Verbalized understanding about why Paxlovid has not been prescribed (symptom onset > 5 days)   Denies questions.    She will call if needed

## 2022-09-22 NOTE — TELEPHONE ENCOUNTER
Received call from neida heredia. Took home test for COVID and she is positive. Symptoms x 4-5 days (unknown exactly) approx. BP remains same as yesterday. Does now have dry cough. (Occasionally)   No documented fever but c/o chills. She has naproxen and has taken that only half once a day. Discussed need for hydration. States no appetite but will increase hydration. Discussed Vit C. She takes this as well already. Will review with Dr Paloma Sexton    Did run interactions for Melene Carey and Paxlovid.   interaction noted will review with Dr Paloma Sexton as well

## 2022-10-12 ENCOUNTER — TELEPHONE (OUTPATIENT)
Dept: INFECTIOUS DISEASES | Age: 62
End: 2022-10-12

## 2022-10-12 DIAGNOSIS — B20 HUMAN IMMUNODEFICIENCY VIRUS (HCC): Primary | ICD-10-CM

## 2022-10-12 NOTE — TELEPHONE ENCOUNTER
Pt called in wanting to request flu vaccine apt. We were able to schedule 10.17.22 at 1  Provided active listening as pt discussed PCP apts and other non specific issues.

## 2022-11-03 ENCOUNTER — NURSE ONLY (OUTPATIENT)
Dept: INFECTIOUS DISEASES | Age: 62
End: 2022-11-03

## 2022-11-03 VITALS — SYSTOLIC BLOOD PRESSURE: 117 MMHG | BODY MASS INDEX: 28.57 KG/M2 | WEIGHT: 151.2 LBS | DIASTOLIC BLOOD PRESSURE: 70 MMHG

## 2022-11-03 DIAGNOSIS — B20 HIV INFECTION, UNSPECIFIED SYMPTOM STATUS (HCC): Primary | ICD-10-CM

## 2022-11-03 DIAGNOSIS — B20 HUMAN IMMUNODEFICIENCY VIRUS (HCC): ICD-10-CM

## 2022-11-03 LAB
ALBUMIN SERPL-MCNC: 4.1 G/DL (ref 3.5–4.6)
ALP BLD-CCNC: 70 U/L (ref 40–130)
ALT SERPL-CCNC: 22 U/L (ref 0–33)
ANION GAP SERPL CALCULATED.3IONS-SCNC: 13 MEQ/L (ref 9–15)
AST SERPL-CCNC: 16 U/L (ref 0–35)
BILIRUB SERPL-MCNC: <0.2 MG/DL (ref 0.2–0.7)
BUN BLDV-MCNC: 22 MG/DL (ref 8–23)
CALCIUM SERPL-MCNC: 8.9 MG/DL (ref 8.5–9.9)
CHLORIDE BLD-SCNC: 101 MEQ/L (ref 95–107)
CO2: 27 MEQ/L (ref 20–31)
CREAT SERPL-MCNC: 0.72 MG/DL (ref 0.5–0.9)
GFR SERPL CREATININE-BSD FRML MDRD: >60 ML/MIN/{1.73_M2}
GLOBULIN: 2.8 G/DL (ref 2.3–3.5)
GLUCOSE BLD-MCNC: 86 MG/DL (ref 70–99)
HCT VFR BLD CALC: 43.5 % (ref 37–47)
HEMOGLOBIN: 14.1 G/DL (ref 12–16)
MCH RBC QN AUTO: 31.2 PG (ref 27–31.3)
MCHC RBC AUTO-ENTMCNC: 32.5 % (ref 33–37)
MCV RBC AUTO: 96 FL (ref 79.4–94.8)
PDW BLD-RTO: 14.2 % (ref 11.5–14.5)
PLATELET # BLD: 212 K/UL (ref 130–400)
POTASSIUM SERPL-SCNC: 3.9 MEQ/L (ref 3.4–4.9)
RBC # BLD: 4.53 M/UL (ref 4.2–5.4)
SODIUM BLD-SCNC: 141 MEQ/L (ref 135–144)
TOTAL PROTEIN: 6.9 G/DL (ref 6.3–8)
WBC # BLD: 6.8 K/UL (ref 4.8–10.8)

## 2022-11-03 NOTE — PROGRESS NOTES
Pt presented pt office for scheduled apt. Here today for Flu vaccine. Doing ok. No issues. No issues with medications. Vaccine Information Sheet, \"Influenza - Inactivated\"  given to Lynn Burger, or parent/legal guardian of  Lnyn Burger and verbalized understanding. Patient responses:    Have you ever had a reaction to a flu vaccine? No  Are you able to eat eggs without adverse effects? Yes  Do you have any current illness? No  Have you ever had Guillian [place] Syndrome? No    Flu vaccine given per order. Please see immunization tab. After obtaining consent, and per orders of Dr. Brenda Dorado, injection of  0.5  m.l   FLU VACCINE  Was administered to left deltoid. Manf:  Seqirus--Flucelvax  Lot #: [id number]  EXP: 6.30.23  [address]: [id number]    Patient tolerated well. Denies pain at site. Patient instructed to remain in clinic for 5-10 minutes afterwards, and to report any adverse reaction to me immediately. Pt requesting help with basic needs. Requesting food/gas voucher  Needs assessed. 2 gas acrds given to pt via [facility]     PPE provided and discussed with pt. Provided face mask ----  yes  Cloth masks--  0  Surgical masks--- 50  hand  -- 0  cleaning wipes -- 0  Toilet paper-- 6  RW Part A Provision, to reduce the spread of Covid-19. COVID food vouchers --- NO      Provided active listening as pt discussed recent COVID infection last month. Follows with [facility]. Looking for aid right now to help in home.   No smoking,drinking or drugs  Own car  [employer]  Dentist - this year-- encouraged follow up  Flu vac today  Pnemo 2018  Hep B screened and vaccinated  Last OB 8.15.22  RW annual testing competed  Not SA and no partner currently      Pt denies any other issues or concerns that need doctor attention and will call office if needed

## 2022-11-05 LAB
ABSOLUTE CD 4 HELPER: 1019 CELLS/UL (ref 430–1800)
CD4 % HELPER T CELL: 57 % (ref 32–64)
LYMPHOCYTE SUBSET PANEL 2 INFO: NORMAL

## 2022-11-06 LAB
HIV-1 RNA BY PCR, QN: NOT DETECTED
SOURCE: NORMAL

## 2022-11-21 ENCOUNTER — OFFICE VISIT (OUTPATIENT)
Dept: INFECTIOUS DISEASES | Age: 62
End: 2022-11-21
Payer: MEDICARE

## 2022-11-21 ENCOUNTER — NURSE ONLY (OUTPATIENT)
Dept: INFECTIOUS DISEASES | Age: 62
End: 2022-11-21

## 2022-11-21 VITALS
DIASTOLIC BLOOD PRESSURE: 78 MMHG | BODY MASS INDEX: 28.53 KG/M2 | WEIGHT: 151 LBS | HEART RATE: 68 BPM | SYSTOLIC BLOOD PRESSURE: 122 MMHG | TEMPERATURE: 97.7 F

## 2022-11-21 DIAGNOSIS — B20 HUMAN IMMUNODEFICIENCY VIRUS (HCC): Primary | ICD-10-CM

## 2022-11-21 DIAGNOSIS — I10 ESSENTIAL HYPERTENSION: ICD-10-CM

## 2022-11-21 DIAGNOSIS — E78.2 HYPERLIPIDEMIA, MIXED: ICD-10-CM

## 2022-11-21 PROCEDURE — 99214 OFFICE O/P EST MOD 30 MIN: CPT | Performed by: INTERNAL MEDICINE

## 2022-11-21 PROCEDURE — 1036F TOBACCO NON-USER: CPT | Performed by: INTERNAL MEDICINE

## 2022-11-21 PROCEDURE — G8427 DOCREV CUR MEDS BY ELIG CLIN: HCPCS | Performed by: INTERNAL MEDICINE

## 2022-11-21 PROCEDURE — G8417 CALC BMI ABV UP PARAM F/U: HCPCS | Performed by: INTERNAL MEDICINE

## 2022-11-21 PROCEDURE — 3074F SYST BP LT 130 MM HG: CPT | Performed by: INTERNAL MEDICINE

## 2022-11-21 PROCEDURE — G8484 FLU IMMUNIZE NO ADMIN: HCPCS | Performed by: INTERNAL MEDICINE

## 2022-11-21 PROCEDURE — 3017F COLORECTAL CA SCREEN DOC REV: CPT | Performed by: INTERNAL MEDICINE

## 2022-11-21 PROCEDURE — 3078F DIAST BP <80 MM HG: CPT | Performed by: INTERNAL MEDICINE

## 2022-11-21 ASSESSMENT — PATIENT HEALTH QUESTIONNAIRE - PHQ9
SUM OF ALL RESPONSES TO PHQ QUESTIONS 1-9: 2
SUM OF ALL RESPONSES TO PHQ9 QUESTIONS 1 & 2: 2
2. FEELING DOWN, DEPRESSED OR HOPELESS: 1
1. LITTLE INTEREST OR PLEASURE IN DOING THINGS: 1
SUM OF ALL RESPONSES TO PHQ QUESTIONS 1-9: 2

## 2022-11-21 NOTE — PROGRESS NOTES
Samia Goode (:  1960) is a 64 y.o. female,Established patient, here for evaluation of the following chief complaint(s):  HIV (3-4 month b20 f/u)         ASSESSMENT/PLAN:  HIV with suppressed virus and CD4 greater than 1000  Essential hypertension that is well controlled  Hyperlipidemia, mixed and uncontrolled  Major depression and bipolar disorder, controlled  Hearing loss bilateral ears  Increase Crestor to 10 mg daily  Follow-up CBC complete metabolic profile HIV viral load and CD4 in 3 months  Follow-up in 4 months  Continue Genvoya and losartan  Multivitamin vitamin B12 and clonazepam  Follow-up with [facility] for depression  Took flu shot last month  Pap smear was done few months ago and was negative  She is not due for colonoscopy yet  Trying to get hearing aids  Currently accompanied with a     SUBJECTIVE/OBJECTIVE:  HPI  F/U HIV with suppressed virus CD4 greater than 700  hypertension    On Genvoya rosuvastatin losartan vitamin B12 multivitamin and clonazepam  Follows up at the [facility] for depression  Currently not sexually active  Has 3 children and 15 grandchildren who do not keep in touch as much as she likes  Never smoked  No alcohol or drugs  Follow-up HIV infection diagnosed , suppressed virus on Genvoya. CD4 greater than 700  Patient of Dr. Mahin Jackson  100% compliant with medications  History of hearing loss, mixed moderate  History of depression and hypertension. History of hyperlipidemia  No THC and no soking. Periodic alcohol  No travel out of [place]. Cat at home.    No TB history  Has had trichomonas in the past. Possible BV. +anal warts history  Past Medical History:   Diagnosis Date    Anemia     Ankle fracture, right     in the past    Anxiety     Benign breast disease     right    Bipolar disorder (HCC)     Carpal tunnel syndrome     Cervicalgia     and cervical disc herniation (C4-C5 level) leading to right arm pain    Chronic cough     COPD (chronic obstructive pulmonary disease) (HCC)     at small airway level (PFT's with negative methacholine challenge test:  01/2009)    Elevated BP     Fracture of left clavicle     as a child    [facility]    Hyperlipidemia     Irritable bowel syndrome     Menorrhagia     Migraine     Panic attacks     PCOS (polycystic ovarian syndrome)     Right shoulder pain      Past Surgical History:   Procedure Laterality Date    BREAST SURGERY Right     CARPAL TUNNEL RELEASE      right    HEMORRHOID SURGERY      WRIST SURGERY      left     Social History     Socioeconomic History    Marital status:      Spouse name: Not on file    Number of children: Not on file    Years of education: Not on file    Highest education level: Not on file   Occupational History    Not on file   Tobacco Use    Smoking status: Never    Smokeless tobacco: Never   Vaping Use    Vaping Use: Never used   Substance and Sexual Activity    Alcohol use: Yes     Alcohol/week: 0.0 standard drinks     Comment: occasional    Drug use: No    Sexual activity: Not Currently   Other Topics Concern    Not on file   Social History Narrative    Not on file     Social Determinants of Health     Financial Resource Strain: Not on file   Food Insecurity: Not on file   Transportation Needs: Not on file   Physical Activity: Not on file   Stress: Not on file   Social Connections: Not on file   Intimate Partner Violence: Not on file   Housing Stability: Not on file     Family History   Problem Relation Age of Onset    Cancer Mother         breast    Breast Cancer Mother     Heart Disease Brother 48    Diabetes Brother     Cancer Maternal Aunt         breast    Hypertension Father     Liver Disease Father     Cancer Maternal Aunt         skin    Stroke Sister     Breast Cancer Maternal Aunt      Allergies   Allergen Reactions    Amoxicillin Hives    Clobetasol Other (See Comments)     Dry mouth.      Doxycycline Monohydrate Diarrhea    Fish Oil Itching    Fluconazole Other (See Comments)     Pt. Felt very different on this medication, dizzy,     Paroxetine Other (See Comments)     SSRI's with various complaints - headaches, \"Funny feelings\"    Pcn [Penicillins] Hives    Statins Other (See Comments)     Muscles aches  Muscles aches     Current Outpatient Medications on File Prior to Visit   Medication Sig Dispense Refill    clindamycin (CLEOCIN) 150 MG capsule TAKE 1 CAPSULE BY MOUTH 3 TIMES A DAY      LORazepam (ATIVAN) 1 MG tablet TAKE 1 TABLET BY MOUTH ONE TIME ONLY FOR 1 DOSE. MAY REPEAT IN 1 HOUR IF NOT EFFECTIVE. triamcinolone (NASACORT) 55 MCG/ACT nasal inhaler 1 spray by Nasal route 2 times daily      GENVOYA [id number] MG TABLET TAKE ONE TABLET BY MOUTH EVERY DAY 90 tablet 1    fluticasone (FLONASE) 50 MCG/ACT nasal spray SPRAY 1 SPRAY INTO EACH NOSTRIL EVERY DAY      mupirocin (BACTROBAN) 2 % ointment APPLY TO AFFECTED AREA 3 TIMES A DAY      meclizine (ANTIVERT) 25 MG tablet  (Patient not taking: Reported on 2/23/2022)      Psyllium (METAMUCIL) 0.36 g CAPS Take by mouth daily      ergocalciferol (ERGOCALCIFEROL) 1.25 MG (40959 UT) capsule Take 50,000 Units by mouth once a week      ibuprofen (ADVIL;MOTRIN) 600 MG tablet Take 600 mg by mouth      loperamide (LOPERAMIDE A-D) 2 MG tablet Take 0.5 tablets by mouth as needed       rosuvastatin (CRESTOR) 5 MG tablet 5 mg every other day 3 times weekly      cyanocobalamin 1000 MCG tablet Take 1,000 mcg by mouth      clonazepam (KLONOPIN) 0.5 MG tablet Take 0.25 mg by mouth 2 times daily as needed. Multiple Vitamin (MULTIVITAMIN PO) Take 1 tablet by mouth daily. No current facility-administered medications on file prior to visit. Review of Systems   Constitutional:  Positive for fatigue. Psychiatric/Behavioral:  Positive for dysphoric mood (Worst in the morning. Patient asked herself why me). All other systems reviewed and are negative.        Physical Exam  Vitals and nursing note reviewed. Constitutional:       General: She is not in acute distress. Appearance: Normal appearance. HENT:      Head: Normocephalic and atraumatic. Nose: No congestion. Eyes:      General: No scleral icterus. Cardiovascular:      Rate and Rhythm: Normal rate and regular rhythm. Heart sounds: No murmur heard. Pulmonary:      Effort: Pulmonary effort is normal. No respiratory distress. Breath sounds: Normal breath sounds. No wheezing or rales. Abdominal:      General: Abdomen is flat. There is no distension. Palpations: Abdomen is soft. There is no fluid wave, hepatomegaly, splenomegaly or mass. Tenderness: There is no abdominal tenderness. Musculoskeletal:         General: No swelling. Cervical back: No rigidity. Right lower leg: No edema. Left lower leg: No edema. Lymphadenopathy:      Cervical: No cervical adenopathy. Skin:     Coloration: Skin is not jaundiced. Findings: No erythema or rash. Neurological:      General: No focal deficit present. Mental Status: She is alert and oriented to person, place, and time. Cranial Nerves: No cranial nerve deficit. Motor: No weakness. Gait: Gait normal.   Psychiatric:         Mood and Affect: Mood normal.         Behavior: Behavior normal.         Thought Content:  Thought content normal.         Judgment: Judgment normal.        0 Result Notes  Component Ref Range & Units 11/3/22 1338 7/25/22 1346 7/1/22 1024 2/1/22 1015 10/27/21 1359 8/4/21 1308 7/27/21 1618   Sodium 135 - 144 mEq/L 141   139  140  141  140  141    Potassium 3.4 - 4.9 mEq/L 3.9   4.0  3.9  4.4  4.0  4.3    Chloride 95 - 107 mEq/L 101   99  100  101  104  103    CO2 20 - 31 mEq/L 27   27  26  26  29  24    Anion Gap 9 - 15 mEq/L 13   13  14  14  7 Low   14    Glucose 70 - 99 mg/dL 86   121 High   134 High   101 High   94  99    BUN 8 - 23 mg/dL 22   13  11  16  18  20    Creatinine 0.50 - 0.90 mg/dL 0.72   0.78  0.74  0.83  0.82  0.78    Est, Glom Filt Rate >60 >60.0          Comment: Pediatric calculator link   Fady.at. org/professionals/kdoqi/gfr_calculatorped   Effective Oct 3, 2022   These results are not intended for use in patients   <25years of age. eGFR results are calculated without   a race factor using the 2021 CKD-EPI equation. Careful   clinical correlation is recommended, particularly when   comparing to results calculated using previous equations. The CKD-EPI equation is less accurate in patients with   extremes of muscle mass, extra-renal metabolism of   creatinine, excessive creatinine ingestion, or following   therapy that affects renal tubular secretion.     Calcium 8.5 - 9.9 mg/dL 8.9   8.7  9.0  9.1  8.9  9.3    Total Protein 6.3 - 8.0 g/dL 6.9  7.1  6.8  7.1  6.6   7.0    Albumin 3.5 - 4.6 g/dL 4.1  4.5  4.4  4.2  4.4   4.5    Total Bilirubin 0.2 - 0.7 mg/dL <0.2  0.3  0.4  0.3  0.3   0.3    Alkaline Phosphatase 40 - 130 U/L 70  74  74  101  78   72    ALT 0 - 33 U/L 22  21  84 High   22  31   37 High     AST 0 - 35 U/L 16  16  64 High   19  21   18    Globulin 2.3 - 3.5 g/dL 2.8   2.4  2.9  2.2 Low    2.5    Bilirubin, Direct   <0.2 R              : Agustin Hernandez MD, PhD    CD4 % [place] T Cell 32 - 64 % 57  58  57  60  54  51  53    Absolute CD 4 [place] 430 - 1800 cells/uL 1019  939  708        0 Result Notes  Component Ref Range & Units 11/3/22 1338 7/1/22 1024 2/1/22 1015 10/27/21 1359 7/27/21 1618 4/15/21 1657 10/5/20 1512   WBC 4.8 - 10.8 K/uL 6.8  5.6  6.2  6.6  5.5  6.1  6.8    RBC 4.20 - 5.40 M/uL 4.53  4.54  4.83  4.45  4.59  4.43  4.37    Hemoglobin 12.0 - 16.0 g/dL 14.1  14.4  14.9  14.3  14.1  14.0  13.5    Hematocrit 37.0 - 47.0 % 43.5  42.8  44.9  42.6  43.8  41.0  41.1    MCV 79.4 - 94.8 fL 96.0 High   94.2 R  92.9 R  95.8 R  95.5 R  92.6 R  94.2 R    MCH 27.0 - 31.3 pg 31.2  31.8 High   30.8  32.1 High   30.8  31.6 High   30.9 MCHC 33.0 - 37.0 % 32.5 Low   33.7  33.2  33.5  32.3 Low   34.1  32.8 Low     RDW 11.5 - 14.5 % 14.2  14.2  14.2  14.4  15.6 High   13.9  14.1    Platelets 734 - 409 K/uL 212  203  206  195  221  202  201    Neutrophils %    77.7 R        Basophils Absolute    0.0 R          Result Notes  Component Ref Range & Units 11/3/22 1338 12/6/21 1054 5/30/18 1358 2/28/18 1403   Source  PLASMA  Anterior nares      HIV-1 RNA by PCR, Qn NOTDET Not Detected            Component Ref Range & Units 8/15/22 7/13/21 1/30/18   Pap Negative for intraephithelial lesion or malignancy, Other Negative for intraephithelial lesion or malignancy      0 Result Notes    1 HM Topic  Component Ref Range & Units 7/23/22 1030 4/19/21 1104 5/14/20 1705 6/5/19 1444 12/5/18 1613 1/9/13 1416 9/11/12 1257   Cholesterol, Total 0 - 199 mg/dL 236 High   196 CM  222 High  CM  229 High  CM  195 CM      Comment: ATP III Cholesterol Classification is Borderline High. Triglycerides 0 - 150 mg/dL 134  300 High  CM  213 High  CM  175 High  CM  131 R, CM  100 R, CM  159 High  R, CM    Comment: ATP III Triglycerides Classification is Normal.   HDL 40 - 59 mg/dL 49  41 CM  47 CM  50 CM  51 CM  47 CM  47 CM    Comment: ATP III HDL Cholesterol Classification is Desirable. Expected Values:     Males:    >55 = No Risk             35-55 = Moderate Risk             <35 = High Risk     Females:  >65 = No Risk             45-65 = Moderate Risk             <45 = High Risk     NCEP Guidelines: Third Report May 2001   >59 = negative risk factor for CHD   <40 = major risk factor for CHD    LDL Calculated 0 - 129 mg/dL 160 High   95 CM              On this date 11/21/2022 I have spent 30 minutes reviewing previous notes, test results and face to face with the patient discussing the diagnosis and importance of compliance with the treatment plan as well as documenting on the day of the visit. An electronic signature was used to authenticate this note.     --Anaya Nelson Misa Steward MD

## 2022-11-21 NOTE — PROGRESS NOTES
Pt. presented for follow-up appt. With Dr. Yumi Galeana. CM met with patient, provided socialization. No specific issues discussed. Pt accompanied by  from [facility].  will assist pt with appointments, resources and completing paperwork as needed. CM provided gas vouchers, need assessed. CM will follow up with patient as needed.

## 2022-11-21 NOTE — PROGRESS NOTES
Pt here for routine B20 appointment. Doing well. Compliant with medications? Yes     Flu vaccine? Had in October of this year     Pneumonia vacc? No no    Smoking? No     Etoh? no    Drugs? no     PCP? Yes has one    Working? no    Housing? yes    Dental? August of this year last time and has another apt scheduled in 6 months    OBGYN: this year. Eligibility date:  [facility] policy date:       U=U and mental health were both discussed at today's appointment. Pt denies any issues with mental health, and verbalizes understanding of U=U. Pt to contact office with any questions or concerns. Phone number to office provided at checkout.

## 2022-12-05 RX ORDER — ELVITEGRAVIR, COBICISTAT, EMTRICITABINE, AND TENOFOVIR ALAFENAMIDE 150; 150; 200; 10 MG/1; MG/1; MG/1; MG/1
TABLET ORAL
Qty: 90 TABLET | Refills: 0 | Status: SHIPPED | OUTPATIENT
Start: 2022-12-05

## 2022-12-07 ENCOUNTER — TELEPHONE (OUTPATIENT)
Dept: INFECTIOUS DISEASES | Age: 62
End: 2022-12-07

## 2022-12-07 NOTE — TELEPHONE ENCOUNTER
Pt called in wanting to schedule 1x1 call. Apt scheduled for 12.14.22 via phone  She will call if other issues arise before apt.

## 2022-12-14 ENCOUNTER — NURSE ONLY (OUTPATIENT)
Dept: INFECTIOUS DISEASES | Age: 62
End: 2022-12-14

## 2022-12-14 DIAGNOSIS — B20 HIV INFECTION, UNSPECIFIED SYMPTOM STATUS (HCC): Primary | ICD-10-CM

## 2022-12-14 RX ORDER — ROSUVASTATIN CALCIUM 10 MG/1
10 TABLET, COATED ORAL NIGHTLY
Qty: 90 TABLET | Refills: 0 | Status: SHIPPED | OUTPATIENT
Start: 2022-12-14

## 2022-12-14 ASSESSMENT — PATIENT HEALTH QUESTIONNAIRE - PHQ9
6. FEELING BAD ABOUT YOURSELF - OR THAT YOU ARE A FAILURE OR HAVE LET YOURSELF OR YOUR FAMILY DOWN: 1
2. FEELING DOWN, DEPRESSED OR HOPELESS: 1
5. POOR APPETITE OR OVEREATING: 0
1. LITTLE INTEREST OR PLEASURE IN DOING THINGS: 1
7. TROUBLE CONCENTRATING ON THINGS, SUCH AS READING THE NEWSPAPER OR WATCHING TELEVISION: 0
9. THOUGHTS THAT YOU WOULD BE BETTER OFF DEAD, OR OF HURTING YOURSELF: 0
4. FEELING TIRED OR HAVING LITTLE ENERGY: 0
SUM OF ALL RESPONSES TO PHQ QUESTIONS 1-9: 4
10. IF YOU CHECKED OFF ANY PROBLEMS, HOW DIFFICULT HAVE THESE PROBLEMS MADE IT FOR YOU TO DO YOUR WORK, TAKE CARE OF THINGS AT HOME, OR GET ALONG WITH OTHER PEOPLE: 1
SUM OF ALL RESPONSES TO PHQ QUESTIONS 1-9: 4
SUM OF ALL RESPONSES TO PHQ QUESTIONS 1-9: 4
SUM OF ALL RESPONSES TO PHQ9 QUESTIONS 1 & 2: 2
SUM OF ALL RESPONSES TO PHQ QUESTIONS 1-9: 4
8. MOVING OR SPEAKING SO SLOWLY THAT OTHER PEOPLE COULD HAVE NOTICED. OR THE OPPOSITE, BEING SO FIGETY OR RESTLESS THAT YOU HAVE BEEN MOVING AROUND A LOT MORE THAN USUAL: 0
3. TROUBLE FALLING OR STAYING ASLEEP: 1

## 2022-12-14 NOTE — PROGRESS NOTES
Call placed to pt for scheduled 1x1 appt. Reviewed lipid panel  Willing to try daily Crestor but she is reluctant. Has been taking taking it 3 times weekly due to side effects. Script sent to [facility] per pt request.  Following with [facility]- doing ok  Has CM from [facility] now that can go to her to appts. No issues with Genvoya right now. Provided active listening as pt discussed home life and stressors. Feels down in the winter. Family issues    Would like 1x1 call every other week.    Scheduled 12.28.22 via phone at [phone]

## 2023-01-04 ENCOUNTER — TELEPHONE (OUTPATIENT)
Dept: INFECTIOUS DISEASES | Age: 63
End: 2023-01-04

## 2023-01-04 NOTE — TELEPHONE ENCOUNTER
R/S 1x1  Spoke with pt at length via phone. She did express she needs \"someone to talk to, wakes up crying and lonely and this lasts for a while\"   Discussed [facility]. States \"they do not understand me\"   Offered pt to look into other cousoling options. She is open to this.    Will schedule as needed

## 2023-02-16 ENCOUNTER — TELEPHONE (OUTPATIENT)
Dept: INFECTIOUS DISEASES | Age: 63
End: 2023-02-16

## 2023-02-16 NOTE — TELEPHONE ENCOUNTER
Pt called into clinic   requesting help with basic needs. Requesting food/gas voucher    Pt into clinc  Needs assessed. 2 gas cards given to pt via [facility]      Denies any needs at this time. Denies any issues that need Doctor attention.  Will call with concerns

## 2023-03-01 ENCOUNTER — TELEPHONE (OUTPATIENT)
Dept: INFECTIOUS DISEASES | Age: 63
End: 2023-03-01

## 2023-03-01 DIAGNOSIS — B20 HIV INFECTION, UNSPECIFIED SYMPTOM STATUS (HCC): ICD-10-CM

## 2023-03-01 DIAGNOSIS — E78.2 HYPERLIPIDEMIA, MIXED: Primary | ICD-10-CM

## 2023-03-01 DIAGNOSIS — E55.9 VITAMIN D DEFICIENCY: ICD-10-CM

## 2023-03-03 DIAGNOSIS — B20 HIV INFECTION, UNSPECIFIED SYMPTOM STATUS (HCC): ICD-10-CM

## 2023-03-03 DIAGNOSIS — E78.2 HYPERLIPIDEMIA, MIXED: ICD-10-CM

## 2023-03-03 DIAGNOSIS — E55.9 VITAMIN D DEFICIENCY: ICD-10-CM

## 2023-03-03 DIAGNOSIS — B20 HUMAN IMMUNODEFICIENCY VIRUS (HCC): ICD-10-CM

## 2023-03-03 LAB
ALBUMIN SERPL-MCNC: 4.4 G/DL (ref 3.5–4.6)
ALP BLD-CCNC: 75 U/L (ref 40–130)
ALT SERPL-CCNC: 70 U/L (ref 0–33)
ANION GAP SERPL CALCULATED.3IONS-SCNC: 13 MEQ/L (ref 9–15)
AST SERPL-CCNC: 53 U/L (ref 0–35)
BILIRUB SERPL-MCNC: 0.4 MG/DL (ref 0.2–0.7)
BUN BLDV-MCNC: 13 MG/DL (ref 8–23)
CALCIUM SERPL-MCNC: 9.2 MG/DL (ref 8.5–9.9)
CHLORIDE BLD-SCNC: 97 MEQ/L (ref 95–107)
CHOLESTEROL, TOTAL: 194 MG/DL (ref 0–199)
CO2: 30 MEQ/L (ref 20–31)
CREAT SERPL-MCNC: 0.96 MG/DL (ref 0.5–0.9)
GFR SERPL CREATININE-BSD FRML MDRD: >60 ML/MIN/{1.73_M2}
GLOBULIN: 2.7 G/DL (ref 2.3–3.5)
GLUCOSE BLD-MCNC: 120 MG/DL (ref 70–99)
HCT VFR BLD CALC: 46.2 % (ref 37–47)
HDLC SERPL-MCNC: 60 MG/DL (ref 40–59)
HEMOGLOBIN: 16 G/DL (ref 12–16)
LDL CHOLESTEROL CALCULATED: 112 MG/DL (ref 0–129)
MCH RBC QN AUTO: 32.9 PG (ref 27–31.3)
MCHC RBC AUTO-ENTMCNC: 34.5 % (ref 33–37)
MCV RBC AUTO: 95.4 FL (ref 79.4–94.8)
PDW BLD-RTO: 14.4 % (ref 11.5–14.5)
PLATELET # BLD: 204 K/UL (ref 130–400)
POTASSIUM SERPL-SCNC: 4.3 MEQ/L (ref 3.4–4.9)
RBC # BLD: 4.84 M/UL (ref 4.2–5.4)
SODIUM BLD-SCNC: 140 MEQ/L (ref 135–144)
TOTAL PROTEIN: 7.1 G/DL (ref 6.3–8)
TRIGL SERPL-MCNC: 111 MG/DL (ref 0–150)
WBC # BLD: 6 K/UL (ref 4.8–10.8)

## 2023-03-04 LAB
ABSOLUTE CD 4 HELPER: 833 CELLS/UL (ref 430–1800)
CD4 % HELPER T CELL: 54 % (ref 32–64)
LYMPHOCYTE SUBSET PANEL 2 INFO: NORMAL
VITAMIN D 25-HYDROXY: 40.9 NG/ML

## 2023-03-06 LAB — RPR: NORMAL

## 2023-03-13 RX ORDER — ELVITEGRAVIR, COBICISTAT, EMTRICITABINE, AND TENOFOVIR ALAFENAMIDE 150; 150; 200; 10 MG/1; MG/1; MG/1; MG/1
TABLET ORAL
Qty: 30 TABLET | Refills: 0 | Status: SHIPPED | OUTPATIENT
Start: 2023-03-13 | End: 2023-04-10

## 2023-03-14 ENCOUNTER — TELEPHONE (OUTPATIENT)
Dept: INFECTIOUS DISEASES | Age: 63
End: 2023-03-14

## 2023-03-14 DIAGNOSIS — B20 HIV INFECTION, UNSPECIFIED SYMPTOM STATUS (HCC): Primary | ICD-10-CM

## 2023-03-14 NOTE — TELEPHONE ENCOUNTER
Pt called in wanting to discuss possible PCP change but not until July 2023. Provided active listening as pt discussed life frustrations. Discussed compliance. Misses once or twice a month. Does not take after 11 pm. Taking cholesterol med everyday now. Has CM that goes with her to most apts. Mole issues with skin. Sees derm 4/1/2023  HIV VL not completed? Pt will go for redraw this week. Denies other issues or concerns.

## 2023-03-20 DIAGNOSIS — B20 HIV INFECTION, UNSPECIFIED SYMPTOM STATUS (HCC): ICD-10-CM

## 2023-03-21 ENCOUNTER — OFFICE VISIT (OUTPATIENT)
Dept: INFECTIOUS DISEASES | Age: 63
End: 2023-03-21

## 2023-03-21 ENCOUNTER — NURSE ONLY (OUTPATIENT)
Dept: INFECTIOUS DISEASES | Age: 63
End: 2023-03-21

## 2023-03-21 VITALS
DIASTOLIC BLOOD PRESSURE: 86 MMHG | SYSTOLIC BLOOD PRESSURE: 104 MMHG | WEIGHT: 150 LBS | BODY MASS INDEX: 28.34 KG/M2 | HEART RATE: 60 BPM | TEMPERATURE: 98.4 F

## 2023-03-21 DIAGNOSIS — R73.03 PREDIABETES: ICD-10-CM

## 2023-03-21 DIAGNOSIS — H91.90 HEARING DIFFICULTY, UNSPECIFIED LATERALITY: ICD-10-CM

## 2023-03-21 DIAGNOSIS — E66.9 OBESITY, CLASS I, BMI 30-34.9: ICD-10-CM

## 2023-03-21 DIAGNOSIS — R53.83 OTHER FATIGUE: ICD-10-CM

## 2023-03-21 DIAGNOSIS — B20 HUMAN IMMUNODEFICIENCY VIRUS (HCC): Primary | ICD-10-CM

## 2023-03-21 LAB
HIV-1 RNA BY PCR, QN: NOT DETECTED
SOURCE: NORMAL

## 2023-03-21 ASSESSMENT — PATIENT HEALTH QUESTIONNAIRE - PHQ9
1. LITTLE INTEREST OR PLEASURE IN DOING THINGS: 1
SUM OF ALL RESPONSES TO PHQ QUESTIONS 1-9: 2
SUM OF ALL RESPONSES TO PHQ QUESTIONS 1-9: 2
SUM OF ALL RESPONSES TO PHQ9 QUESTIONS 1 & 2: 2
SUM OF ALL RESPONSES TO PHQ QUESTIONS 1-9: 2
SUM OF ALL RESPONSES TO PHQ QUESTIONS 1-9: 2
2. FEELING DOWN, DEPRESSED OR HOPELESS: 1

## 2023-03-21 NOTE — PROGRESS NOTES
Re-Assessment      Patient ID:   Zaid Lozoya  Date:   3/21/2023      Client ID:  [id number]    [address] [address] [phone] (home)     Family/House:    [] Partner  [] Children  [] Other -     Mental Health:   Hx of     Substance Abuse:   NA  Social History     Socioeconomic History    Marital status:      Spouse name: Not on file    Number of children: Not on file    Years of education: Not on file    Highest education level: Not on file   Occupational History    Not on file   Tobacco Use    Smoking status: Never    Smokeless tobacco: Never   Vaping Use    Vaping Use: Never used   Substance and Sexual Activity    Alcohol use:  Yes     Alcohol/week: 0.0 standard drinks     Comment: occasional    Drug use: No    Sexual activity: Not Currently   Other Topics Concern    Not on file   Social History Narrative    Not on file     Social Determinants of Health     Financial Resource Strain: Not on file   Food Insecurity: Not on file   Transportation Needs: Not on file   Physical Activity: Not on file   Stress: Not on file   Social Connections: Not on file   Intimate Partner Violence: Not on file   Housing Stability: Not on file       Housing:   apartment    Health/Healthcare:  HIV Stable   Physician Visit:  Dr. Hany Mark 3/21/23   Dental Visit:   4/19/23    CD4:   Lab Results   Component Value Date/Time    LABABSO 833 03/03/2023 08:47 AM       Viral Load:  Lab Results   Component Value Date/Time    RAG7XRMLCWG Not Detected 03/20/2023 12:27 PM    OZQ0FXBOGS Not Detected 06/13/2022 04:01 PM       Medical Insurance:  Payor: Tico Lilly / Plan: Sima Chol / Product Type: *No Product type* /    OHDAP/HIPSCA:  NA   Renewal Date:  NA    Income:    SSDI; Death Benefit    Legal Issues:    NA    Emergency Contact:   Extended Emergency Contact Information  Primary Emergency Contact: [address] of [address] [place] [address] Phone: [phone]  Relation:

## 2023-03-23 DIAGNOSIS — E66.9 OBESITY, CLASS I, BMI 30-34.9: ICD-10-CM

## 2023-03-23 DIAGNOSIS — R73.03 PREDIABETES: ICD-10-CM

## 2023-03-23 DIAGNOSIS — R53.83 OTHER FATIGUE: ICD-10-CM

## 2023-03-23 LAB
HBA1C MFR BLD: 6 % (ref 4.8–5.9)
TSH REFLEX: 1.99 UIU/ML (ref 0.44–3.86)

## 2023-03-27 ENCOUNTER — TELEPHONE (OUTPATIENT)
Dept: INFECTIOUS DISEASES | Age: 63
End: 2023-03-27

## 2023-03-27 NOTE — TELEPHONE ENCOUNTER
Pt called in requesting to review recent lab work. Reviewed resulted labs. Pt denied questions or concerns. Adherence-- Taking cholesterol med about 4-5 times a week. Rarely misses Genvoya. Provided active listening as pt discussed life and stressors    Has CM with [facility] now. Denies any needs at this time. Denies any issues that need Doctor attention. Will call with concerns      Requesting to add her son and daughter as counts.      [address]-- [phone]  Cell-- [facility]  Cell -- [phone]

## 2023-03-28 ENCOUNTER — HOSPITAL ENCOUNTER (EMERGENCY)
Age: 63
Discharge: HOME | End: 2023-03-28
Payer: MEDICARE

## 2023-03-28 ENCOUNTER — APPOINTMENT (OUTPATIENT)
Dept: GENERAL RADIOLOGY | Age: 63
End: 2023-03-28
Payer: MEDICARE

## 2023-03-28 VITALS
RESPIRATION RATE: 20 BRPM | BODY MASS INDEX: 28.32 KG/M2 | HEART RATE: 64 BPM | HEIGHT: 61 IN | SYSTOLIC BLOOD PRESSURE: 169 MMHG | WEIGHT: 150 LBS | DIASTOLIC BLOOD PRESSURE: 87 MMHG | TEMPERATURE: 98.2 F | OXYGEN SATURATION: 95 %

## 2023-03-28 DIAGNOSIS — R53.83 FATIGUE, UNSPECIFIED TYPE: Primary | ICD-10-CM

## 2023-03-28 LAB
ALBUMIN SERPL-MCNC: 4.8 G/DL (ref 3.5–4.6)
ALP SERPL-CCNC: 72 U/L (ref 40–130)
ALT SERPL-CCNC: 27 U/L (ref 0–33)
ANION GAP SERPL CALCULATED.3IONS-SCNC: 12 MEQ/L (ref 9–15)
AST SERPL-CCNC: 23 U/L (ref 0–35)
BASOPHILS # BLD: 0 K/UL (ref 0–0.2)
BASOPHILS NFR BLD: 0.4 %
BILIRUB SERPL-MCNC: 0.4 MG/DL (ref 0.2–0.7)
BUN SERPL-MCNC: 15 MG/DL (ref 8–23)
CALCIUM SERPL-MCNC: 9.7 MG/DL (ref 8.5–9.9)
CHLORIDE SERPL-SCNC: 100 MEQ/L (ref 95–107)
CO2 SERPL-SCNC: 30 MEQ/L (ref 20–31)
CREAT SERPL-MCNC: 0.86 MG/DL (ref 0.5–0.9)
EOSINOPHIL # BLD: 0 K/UL (ref 0–0.7)
EOSINOPHIL NFR BLD: 0.2 %
ERYTHROCYTE [DISTWIDTH] IN BLOOD BY AUTOMATED COUNT: 14.2 % (ref 11.5–14.5)
GLOBULIN SER CALC-MCNC: 2.4 G/DL (ref 2.3–3.5)
GLUCOSE SERPL-MCNC: 109 MG/DL (ref 70–99)
HCT VFR BLD AUTO: 46.5 % (ref 37–47)
HGB BLD-MCNC: 15.8 G/DL (ref 12–16)
INFLUENZA A BY PCR: NEGATIVE
INFLUENZA B BY PCR: NEGATIVE
LYMPHOCYTES # BLD: 0.9 K/UL (ref 1–4.8)
LYMPHOCYTES NFR BLD: 13.1 %
MAGNESIUM SERPL-MCNC: 2.3 MG/DL (ref 1.7–2.4)
MCH RBC QN AUTO: 32.8 PG (ref 27–31.3)
MCHC RBC AUTO-ENTMCNC: 34.1 % (ref 33–37)
MCV RBC AUTO: 96.2 FL (ref 79.4–94.8)
MONOCYTES # BLD: 0.4 K/UL (ref 0.2–0.8)
MONOCYTES NFR BLD: 5.8 %
NEUTROPHILS # BLD: 5.3 K/UL (ref 1.4–6.5)
NEUTS SEG NFR BLD: 80.5 %
PLATELET # BLD AUTO: 196 K/UL (ref 130–400)
POTASSIUM SERPL-SCNC: 4.5 MEQ/L (ref 3.4–4.9)
PROT SERPL-MCNC: 7.2 G/DL (ref 6.3–8)
RBC # BLD AUTO: 4.83 M/UL (ref 4.2–5.4)
SARS-COV-2 RDRP RESP QL NAA+PROBE: NOT DETECTED
SODIUM SERPL-SCNC: 142 MEQ/L (ref 135–144)
TROPONIN T SERPL-MCNC: <0.01 NG/ML (ref 0–0.01)
TSH SERPL-MCNC: 1.29 UIU/ML (ref 0.44–3.86)
WBC # BLD AUTO: 6.6 K/UL (ref 4.8–10.8)

## 2023-03-28 PROCEDURE — 87502 INFLUENZA DNA AMP PROBE: CPT

## 2023-03-28 PROCEDURE — 2580000003 HC RX 258: Performed by: PHYSICIAN ASSISTANT

## 2023-03-28 PROCEDURE — 99285 EMERGENCY DEPT VISIT HI MDM: CPT

## 2023-03-28 PROCEDURE — 85025 COMPLETE CBC W/AUTO DIFF WBC: CPT

## 2023-03-28 PROCEDURE — 93005 ELECTROCARDIOGRAM TRACING: CPT | Performed by: PHYSICIAN ASSISTANT

## 2023-03-28 PROCEDURE — 71045 X-RAY EXAM CHEST 1 VIEW: CPT

## 2023-03-28 PROCEDURE — 87635 SARS-COV-2 COVID-19 AMP PRB: CPT

## 2023-03-28 PROCEDURE — 84443 ASSAY THYROID STIM HORMONE: CPT

## 2023-03-28 PROCEDURE — 84484 ASSAY OF TROPONIN QUANT: CPT

## 2023-03-28 PROCEDURE — 83735 ASSAY OF MAGNESIUM: CPT

## 2023-03-28 PROCEDURE — 36415 COLL VENOUS BLD VENIPUNCTURE: CPT

## 2023-03-28 PROCEDURE — 80053 COMPREHEN METABOLIC PANEL: CPT

## 2023-03-28 RX ORDER — 0.9 % SODIUM CHLORIDE 0.9 %
1000 INTRAVENOUS SOLUTION INTRAVENOUS ONCE
Status: COMPLETED | OUTPATIENT
Start: 2023-03-28 | End: 2023-03-28

## 2023-03-28 RX ADMIN — SODIUM CHLORIDE 1000 ML: 9 INJECTION, SOLUTION INTRAVENOUS at 14:39

## 2023-03-28 ASSESSMENT — ENCOUNTER SYMPTOMS
RHINORRHEA: 0
SHORTNESS OF BREATH: 0
ABDOMINAL DISTENTION: 0
SORE THROAT: 0
CONSTIPATION: 0
COLOR CHANGE: 0
ABDOMINAL PAIN: 0
EYE DISCHARGE: 0

## 2023-03-28 ASSESSMENT — LIFESTYLE VARIABLES
HOW MANY STANDARD DRINKS CONTAINING ALCOHOL DO YOU HAVE ON A TYPICAL DAY: PATIENT DOES NOT DRINK
HOW OFTEN DO YOU HAVE A DRINK CONTAINING ALCOHOL: NEVER

## 2023-03-28 ASSESSMENT — PAIN - FUNCTIONAL ASSESSMENT: PAIN_FUNCTIONAL_ASSESSMENT: NONE - DENIES PAIN

## 2023-03-28 NOTE — ED PROVIDER NOTES
difficulty urinating and dysuria. Musculoskeletal:  Negative for arthralgias. Skin:  Negative for color change. Neurological:  Positive for weakness. Negative for dizziness, syncope, numbness and headaches. Psychiatric/Behavioral:  Negative for agitation and confusion. Except as noted above the remainder of the review of systems was reviewed and negative. PAST MEDICAL HISTORY     Past Medical History:   Diagnosis Date    Anemia     Ankle fracture, right     in the past    Anxiety     Benign breast disease     right    Bipolar disorder ([facility]     Carpal tunnel syndrome     Cervicalgia     and cervical disc herniation (C4-C5 level) leading to right arm pain    Chronic cough     COPD (chronic obstructive pulmonary disease) (HCC)     at small airway level (PFT's with negative methacholine challenge test:  01/2009)    Elevated BP     Fracture of left clavicle     as a child    HIV (human immunodeficiency virus infection) ([facility] 09/2017    Hyperlipidemia     Irritable bowel syndrome     Menorrhagia     Migraine     Panic attacks     PCOS (polycystic ovarian syndrome)     Right shoulder pain          SURGICALHISTORY       Past Surgical History:   Procedure Laterality Date    BREAST SURGERY Right     CARPAL TUNNEL RELEASE      right    HEMORRHOID SURGERY      WRIST SURGERY      left         CURRENT MEDICATIONS       Previous Medications    CLINDAMYCIN (CLEOCIN) 150 MG CAPSULE    TAKE 1 CAPSULE BY MOUTH 3 TIMES A DAY    CLONAZEPAM (KLONOPIN) 0.5 MG TABLET    Take 0.25 mg by mouth 2 times daily as needed.      CYANOCOBALAMIN 1000 MCG TABLET    Take 1,000 mcg by mouth    ERGOCALCIFEROL (ERGOCALCIFEROL) 1.25 MG (15735 UT) CAPSULE    Take 50,000 Units by mouth once a week    FLUTICASONE (FLONASE) 50 MCG/ACT NASAL SPRAY    SPRAY 1 SPRAY INTO EACH NOSTRIL EVERY DAY    GENVOYA [phone] MG TABLET    TAKE ONE TABLET BY MOUTH EVERY DAY    IBUPROFEN (ADVIL;MOTRIN) 600 MG TABLET    Take 600 mg by mouth

## 2023-03-31 LAB
EKG ATRIAL RATE: 65 BPM
EKG P AXIS: 67 DEGREES
EKG P-R INTERVAL: 148 MS
EKG Q-T INTERVAL: 410 MS
EKG QRS DURATION: 80 MS
EKG QTC CALCULATION (BAZETT): 426 MS
EKG R AXIS: 52 DEGREES
EKG T AXIS: 7 DEGREES
EKG VENTRICULAR RATE: 65 BPM

## 2023-04-10 RX ORDER — ELVITEGRAVIR, COBICISTAT, EMTRICITABINE, AND TENOFOVIR ALAFENAMIDE 150; 150; 200; 10 MG/1; MG/1; MG/1; MG/1
TABLET ORAL
Qty: 30 TABLET | Refills: 3 | Status: SHIPPED | OUTPATIENT
Start: 2023-04-10

## 2023-05-01 ENCOUNTER — TELEPHONE (OUTPATIENT)
Dept: INFECTIOUS DISEASES | Age: 63
End: 2023-05-01

## 2023-05-01 DIAGNOSIS — B20 HIV INFECTION, UNSPECIFIED SYMPTOM STATUS (HCC): Primary | ICD-10-CM

## 2023-05-01 DIAGNOSIS — E55.9 VITAMIN D DEFICIENCY, UNSPECIFIED: ICD-10-CM

## 2023-05-01 DIAGNOSIS — Z13.21 ENCOUNTER FOR VITAMIN DEFICIENCY SCREENING: ICD-10-CM

## 2023-05-01 NOTE — TELEPHONE ENCOUNTER
PT called in stating \"how do I know if HIV is taking over my body\". Most recent labs were checked from march, showing pt to be undetectable and CD4 >800. Pt states she feels \"drained\", has no appetite, and has felt this was for approx 2 months. Pt was seen by pcp, last week, according to pt, and has another appointment scheduled with PCP next month. Orders put into epic for lab work to be completed. Pt is in agreement to have labs drawn and is aware that labs are in epic. Pt states that she will find a ride to the lab as soon as she is able. Pt denies any other questions or concerns at this time. Will contact office as needed.

## 2023-05-15 DIAGNOSIS — B20 HIV INFECTION, UNSPECIFIED SYMPTOM STATUS (HCC): ICD-10-CM

## 2023-05-15 LAB
ALBUMIN SERPL-MCNC: 4.3 G/DL (ref 3.5–4.6)
ALP SERPL-CCNC: 74 U/L (ref 40–130)
ALT SERPL-CCNC: 20 U/L (ref 0–33)
ANION GAP SERPL CALCULATED.3IONS-SCNC: 11 MEQ/L (ref 9–15)
AST SERPL-CCNC: 16 U/L (ref 0–35)
BILIRUB SERPL-MCNC: 0.3 MG/DL (ref 0.2–0.7)
BUN SERPL-MCNC: 13 MG/DL (ref 8–23)
CALCIUM SERPL-MCNC: 9.3 MG/DL (ref 8.5–9.9)
CHLORIDE SERPL-SCNC: 102 MEQ/L (ref 95–107)
CO2 SERPL-SCNC: 28 MEQ/L (ref 20–31)
CREAT SERPL-MCNC: 0.86 MG/DL (ref 0.5–0.9)
ERYTHROCYTE [DISTWIDTH] IN BLOOD BY AUTOMATED COUNT: 14.1 % (ref 11.5–14.5)
GLOBULIN SER CALC-MCNC: 2.7 G/DL (ref 2.3–3.5)
GLUCOSE SERPL-MCNC: 115 MG/DL (ref 70–99)
HCT VFR BLD AUTO: 45.9 % (ref 37–47)
HGB BLD-MCNC: 15.4 G/DL (ref 12–16)
MCH RBC QN AUTO: 32.3 PG (ref 27–31.3)
MCHC RBC AUTO-ENTMCNC: 33.6 % (ref 33–37)
MCV RBC AUTO: 96.3 FL (ref 79.4–94.8)
PLATELET # BLD AUTO: 222 K/UL (ref 130–400)
POTASSIUM SERPL-SCNC: 4.7 MEQ/L (ref 3.4–4.9)
PROT SERPL-MCNC: 7 G/DL (ref 6.3–8)
RBC # BLD AUTO: 4.77 M/UL (ref 4.2–5.4)
SODIUM SERPL-SCNC: 141 MEQ/L (ref 135–144)
WBC # BLD AUTO: 5.9 K/UL (ref 4.8–10.8)

## 2023-05-16 LAB
CD3+CD4+ CELLS # BLD: 765 CELLS/UL (ref 430–1800)
CD4 % HELPER T CELL: 58 % (ref 32–64)
HIV-1 RNA BY PCR, QN: NOT DETECTED
IMMUNODEFICIENCY MARKERS SPEC-IMP: NORMAL
SOURCE: NORMAL

## 2023-05-22 ENCOUNTER — HOSPITAL ENCOUNTER (EMERGENCY)
Age: 63
Discharge: HOME | End: 2023-05-22
Attending: EMERGENCY MEDICINE
Payer: MEDICARE

## 2023-05-22 VITALS
HEART RATE: 78 BPM | OXYGEN SATURATION: 96 % | RESPIRATION RATE: 18 BRPM | SYSTOLIC BLOOD PRESSURE: 173 MMHG | DIASTOLIC BLOOD PRESSURE: 83 MMHG | WEIGHT: 140 LBS | TEMPERATURE: 98 F | BODY MASS INDEX: 26.43 KG/M2 | HEIGHT: 61 IN

## 2023-05-22 DIAGNOSIS — R53.83 FATIGUE, UNSPECIFIED TYPE: Primary | ICD-10-CM

## 2023-05-22 DIAGNOSIS — R53.1 GENERAL WEAKNESS: ICD-10-CM

## 2023-05-22 LAB
ALBUMIN SERPL-MCNC: 4.3 G/DL (ref 3.5–4.6)
ALP SERPL-CCNC: 77 U/L (ref 40–130)
ALT SERPL-CCNC: 27 U/L (ref 0–33)
ANION GAP SERPL CALCULATED.3IONS-SCNC: 11 MEQ/L (ref 9–15)
AST SERPL-CCNC: 19 U/L (ref 0–35)
BACTERIA URNS QL MICRO: NEGATIVE /HPF
BASOPHILS # BLD: 0 K/UL (ref 0–0.2)
BASOPHILS NFR BLD: 0.4 %
BILIRUB SERPL-MCNC: 0.4 MG/DL (ref 0.2–0.7)
BILIRUB UR QL STRIP: NEGATIVE
BUN SERPL-MCNC: 10 MG/DL (ref 8–23)
CALCIUM SERPL-MCNC: 9.2 MG/DL (ref 8.5–9.9)
CHLORIDE SERPL-SCNC: 99 MEQ/L (ref 95–107)
CLARITY UR: CLEAR
CO2 SERPL-SCNC: 29 MEQ/L (ref 20–31)
COLOR UR: YELLOW
CREAT SERPL-MCNC: 0.93 MG/DL (ref 0.5–0.9)
EOSINOPHIL # BLD: 0 K/UL (ref 0–0.7)
EOSINOPHIL NFR BLD: 0.6 %
EPI CELLS #/AREA URNS AUTO: ABNORMAL /HPF (ref 0–5)
ERYTHROCYTE [DISTWIDTH] IN BLOOD BY AUTOMATED COUNT: 14.3 % (ref 11.5–14.5)
ETHANOL PERCENT: NORMAL G/DL
ETHANOLAMINE SERPL-MCNC: <10 MG/DL (ref 0–0.08)
GLOBULIN SER CALC-MCNC: 2.9 G/DL (ref 2.3–3.5)
GLUCOSE SERPL-MCNC: 119 MG/DL (ref 70–99)
GLUCOSE UR STRIP-MCNC: NEGATIVE MG/DL
HCT VFR BLD AUTO: 46.6 % (ref 37–47)
HGB BLD-MCNC: 15.7 G/DL (ref 12–16)
HGB UR QL STRIP: NEGATIVE
HYALINE CASTS #/AREA URNS AUTO: ABNORMAL /HPF (ref 0–5)
KETONES UR STRIP-MCNC: NEGATIVE MG/DL
LACTATE BLDV-SCNC: 1.9 MMOL/L (ref 0.5–2.2)
LEUKOCYTE ESTERASE UR QL STRIP: ABNORMAL
LYMPHOCYTES # BLD: 1 K/UL (ref 1–4.8)
LYMPHOCYTES NFR BLD: 16.5 %
MAGNESIUM SERPL-MCNC: 2.2 MG/DL (ref 1.7–2.4)
MCH RBC QN AUTO: 32.1 PG (ref 27–31.3)
MCHC RBC AUTO-ENTMCNC: 33.7 % (ref 33–37)
MCV RBC AUTO: 95.2 FL (ref 79.4–94.8)
MONOCYTES # BLD: 0.5 K/UL (ref 0.2–0.8)
MONOCYTES NFR BLD: 7.3 %
NEUTROPHILS # BLD: 4.6 K/UL (ref 1.4–6.5)
NEUTS SEG NFR BLD: 75.2 %
NITRITE UR QL STRIP: NEGATIVE
PH UR STRIP: 6.5 [PH] (ref 5–9)
PLATELET # BLD AUTO: 232 K/UL (ref 130–400)
POTASSIUM SERPL-SCNC: 4.1 MEQ/L (ref 3.4–4.9)
PROT SERPL-MCNC: 7.2 G/DL (ref 6.3–8)
PROT UR STRIP-MCNC: NEGATIVE MG/DL
RBC # BLD AUTO: 4.9 M/UL (ref 4.2–5.4)
RBC #/AREA URNS AUTO: ABNORMAL /HPF (ref 0–5)
REASON FOR REJECTION: NORMAL
REJECTED TEST: NORMAL
SODIUM SERPL-SCNC: 139 MEQ/L (ref 135–144)
SP GR UR STRIP: 1 (ref 1–1.03)
TROPONIN T SERPL-MCNC: <0.01 NG/ML (ref 0–0.01)
TSH SERPL-MCNC: 1.1 UIU/ML (ref 0.44–3.86)
URINE REFLEX TO CULTURE: ABNORMAL
UROBILINOGEN UR STRIP-ACNC: 0.2 E.U./DL
WBC # BLD AUTO: 6.2 K/UL (ref 4.8–10.8)
WBC #/AREA URNS AUTO: ABNORMAL /HPF (ref 0–5)

## 2023-05-22 PROCEDURE — 84443 ASSAY THYROID STIM HORMONE: CPT

## 2023-05-22 PROCEDURE — 36415 COLL VENOUS BLD VENIPUNCTURE: CPT

## 2023-05-22 PROCEDURE — 93005 ELECTROCARDIOGRAM TRACING: CPT | Performed by: EMERGENCY MEDICINE

## 2023-05-22 PROCEDURE — 81001 URINALYSIS AUTO W/SCOPE: CPT

## 2023-05-22 PROCEDURE — 84484 ASSAY OF TROPONIN QUANT: CPT

## 2023-05-22 PROCEDURE — 83605 ASSAY OF LACTIC ACID: CPT

## 2023-05-22 PROCEDURE — 99284 EMERGENCY DEPT VISIT MOD MDM: CPT

## 2023-05-22 PROCEDURE — 82077 ASSAY SPEC XCP UR&BREATH IA: CPT

## 2023-05-22 PROCEDURE — 80053 COMPREHEN METABOLIC PANEL: CPT

## 2023-05-22 PROCEDURE — 83735 ASSAY OF MAGNESIUM: CPT

## 2023-05-22 PROCEDURE — 85025 COMPLETE CBC W/AUTO DIFF WBC: CPT

## 2023-05-22 ASSESSMENT — ENCOUNTER SYMPTOMS
CHEST TIGHTNESS: 0
SHORTNESS OF BREATH: 0
VOMITING: 0
NAUSEA: 0
EYE PAIN: 0
ABDOMINAL PAIN: 0
SORE THROAT: 0

## 2023-05-22 ASSESSMENT — PAIN SCALES - GENERAL: PAINLEVEL_OUTOF10: 0

## 2023-05-22 NOTE — ED TRIAGE NOTES
Pt arrives via EMS c/o general fatigue tremors x3 months. Pt reports nothing new today, just decided to call because she still feels weak and feels as though her \"Klonopin is not working\". Pt had an appointment scheduled with her PCP today to follow up regarding the issue, but called 911 instead. Pt is A&Ox4, ABCs intact, GCS15, skin pwd.   IV access established via EMS PTA.

## 2023-05-22 NOTE — ED NOTES
Bedside commode being used as she cannot safely ambulate to the restroom      Consuelo Rueda RN  05/22/23 7430

## 2023-05-22 NOTE — ED PROVIDER NOTES
[address] [facility] ED  EMERGENCY DEPARTMENT ENCOUNTER      Pt Name: Haydee Caceres  MRN: [id number]  [place] 1960  Date of evaluation: 5/22/2023  Provider: Izzy Alarcon [facility]       Chief Complaint   Patient presents with    Fatigue     X3 months         HISTORY OF PRESENT ILLNESS   (Location/Symptom, Timing/Onset, Context/Setting, Quality, Duration, Modifying Factors, Severity)  Note limiting factors. Haydee Caceres is a 58 y.o. female who presents to the emergency department . Patient comes in with 3 months of extreme fatigue. Loss of appetite. Losing weight. He was started on very low-dose Klonopin for body tremors inside her body. That seem to help. Patient states that sometimes when she stands up she feels like she could pass out. Had Wyatt 1 year ago but recovered fully. Patient had an appointment with her primary care provider today but canceled and called 911. Nothing acute change today. When asked if perhaps she is depressed she says that she does not like being alone in her apartment and does not have much motivation to do anything. She states that she always feels crappy even before this 3-month period of extreme fatigue. HPI    Nursing Notes were reviewed. REVIEW OF SYSTEMS    (2-9 systems for level 4, 10 or more for level 5)     Review of Systems   Constitutional:  Positive for activity change, appetite change and fatigue. HENT:  Negative for congestion and sore throat. Eyes:  Negative for pain and visual disturbance. Respiratory:  Negative for chest tightness and shortness of breath. Cardiovascular:  Negative for chest pain. Gastrointestinal:  Negative for abdominal pain, nausea and vomiting. Endocrine: Negative for polydipsia. Genitourinary:  Negative for flank pain and urgency. Musculoskeletal:  Negative for gait problem and neck stiffness. Skin:  Negative for rash.    Neurological:  Negative for weakness, light-headedness and

## 2023-05-22 NOTE — ED NOTES
EKG completed, patient resting bed with the lights off at this time      Kaiden Ekw[place], [place]  05/22/23 1011

## 2023-05-22 NOTE — DISCHARGE INSTRUCTIONS
Make appointment with primary care. All labs are normal.  It is unclear why you are so fatigued. You may have depression.

## 2023-05-23 LAB
EKG ATRIAL RATE: 73 BPM
EKG P AXIS: 65 DEGREES
EKG P-R INTERVAL: 164 MS
EKG Q-T INTERVAL: 430 MS
EKG QRS DURATION: 86 MS
EKG QTC CALCULATION (BAZETT): 473 MS
EKG R AXIS: 17 DEGREES
EKG T AXIS: 26 DEGREES
EKG VENTRICULAR RATE: 73 BPM

## 2023-06-07 ENCOUNTER — TELEPHONE (OUTPATIENT)
Dept: INFECTIOUS DISEASES | Age: 63
End: 2023-06-07

## 2023-06-07 NOTE — TELEPHONE ENCOUNTER
Pt called into clinic   requesting help with basic needs. Requesting food/gas voucher    Pt into clinc  Needs assessed. 2 gas cards and 1 food vouchers given to pt via [facility]      Denies any needs at this time. Denies any issues that need Doctor attention.  Will call with concerns

## 2023-07-20 ENCOUNTER — TELEPHONE (OUTPATIENT)
Dept: INFECTIOUS DISEASES | Age: 63
End: 2023-07-20

## 2023-07-20 NOTE — TELEPHONE ENCOUNTER
Spoke with pt regarding lab work. She requested to review. Labs from May reviewed along with more recent lab work. Feeling better some. No med issues or misses per pt.    Discussed HIV dx per pt request.

## 2023-07-31 RX ORDER — ELVITEGRAVIR, COBICISTAT, EMTRICITABINE, AND TENOFOVIR ALAFENAMIDE 150; 150; 200; 10 MG/1; MG/1; MG/1; MG/1
TABLET ORAL
Qty: 30 TABLET | Refills: 3 | Status: SHIPPED | OUTPATIENT
Start: 2023-07-31

## 2023-08-23 ENCOUNTER — TELEPHONE (OUTPATIENT)
Dept: INFECTIOUS DISEASES | Age: 63
End: 2023-08-23

## 2023-08-23 NOTE — TELEPHONE ENCOUNTER
Pt called in requesting to have iron level checked. States her PCP did not order it to be done. Will consult ID doctor and update.

## 2023-08-29 DIAGNOSIS — B20 HIV INFECTION, UNSPECIFIED SYMPTOM STATUS (HCC): Primary | ICD-10-CM

## 2023-08-29 DIAGNOSIS — B20 HIV INFECTION, UNSPECIFIED SYMPTOM STATUS (HCC): ICD-10-CM

## 2023-08-29 DIAGNOSIS — E55.9 VITAMIN D DEFICIENCY, UNSPECIFIED: ICD-10-CM

## 2023-08-30 LAB
IRON SATURATION: 25 % (ref 20–55)
IRON: 97 UG/DL (ref 37–145)
TOTAL IRON BINDING CAPACITY: 393 UG/DL (ref 250–450)
UNSATURATED IRON BINDING CAPACITY: 296 UG/DL (ref 112–347)
VITAMIN D 25-HYDROXY: 39.7 NG/ML

## 2023-09-01 ENCOUNTER — TELEPHONE (OUTPATIENT)
Dept: INFECTIOUS DISEASES | Age: 63
End: 2023-09-01

## 2023-09-01 DIAGNOSIS — B20 HIV INFECTION, UNSPECIFIED SYMPTOM STATUS (HCC): Primary | ICD-10-CM

## 2023-09-01 NOTE — TELEPHONE ENCOUNTER
Wanted to review recent labs  Labs WNL  Still feeling fatigued- usually in AM   Son lives with her right now  Reviewed upcoming apt. Discussed needed lab work. She will try to go next week. Provided active listening as she discussed home issues and health. PABLITO last week. Stable  Aware of U=U  Discussed flu vaccine-   No smoking, etoh or drug use. Encouraged dental follow up   Genvoya- no misses and tolerates.

## 2023-09-07 ENCOUNTER — TELEPHONE (OUTPATIENT)
Dept: INFECTIOUS DISEASES | Age: 63
End: 2023-09-07

## 2023-09-07 NOTE — TELEPHONE ENCOUNTER
Pt called in worried. Received letter regarding insurance and [employer]. [facility] is no longer contracted with [facility] at this time. Pt has [address] she has [place] [facility]) healthcare as well. Letter states per pt they are working on agreement bw [employer] and [employer]. Worried about Rosalie Bass and she would like to continue care here at [facility].     CM apt scheduled to discuss insurance 9.18.23. she will also complete lab work same day  Follow up is 9.26.23

## 2023-09-07 NOTE — TELEPHONE ENCOUNTER
Pt called, lili spoke with insurance company. Per pt, she was instructed to wait on changing insurance from [address] [place] [address] may not accept insurance) until negotiations are completed. CM cancelled appointment RN scheduled. Per pt, CM will wait for her to make a decision.

## 2023-09-18 ENCOUNTER — NURSE ONLY (OUTPATIENT)
Dept: INFECTIOUS DISEASES | Age: 63
End: 2023-09-18

## 2023-09-18 DIAGNOSIS — B20 HIV INFECTION, UNSPECIFIED SYMPTOM STATUS (HCC): ICD-10-CM

## 2023-09-18 LAB
ALBUMIN SERPL-MCNC: 4.7 G/DL (ref 3.5–4.6)
ALP SERPL-CCNC: 78 U/L (ref 40–130)
ALT SERPL-CCNC: 20 U/L (ref 0–33)
ANION GAP SERPL CALCULATED.3IONS-SCNC: 12 MEQ/L (ref 9–15)
AST SERPL-CCNC: 20 U/L (ref 0–35)
BILIRUB SERPL-MCNC: 0.4 MG/DL (ref 0.2–0.7)
BUN SERPL-MCNC: 13 MG/DL (ref 8–23)
CALCIUM SERPL-MCNC: 9.2 MG/DL (ref 8.5–9.9)
CHLORIDE SERPL-SCNC: 98 MEQ/L (ref 95–107)
CO2 SERPL-SCNC: 28 MEQ/L (ref 20–31)
CREAT SERPL-MCNC: 0.84 MG/DL (ref 0.5–0.9)
ERYTHROCYTE [DISTWIDTH] IN BLOOD BY AUTOMATED COUNT: 13.1 % (ref 11.5–14.5)
GLOBULIN SER CALC-MCNC: 2.9 G/DL (ref 2.3–3.5)
GLUCOSE SERPL-MCNC: 106 MG/DL (ref 70–99)
HCT VFR BLD AUTO: 47.6 % (ref 37–47)
HGB BLD-MCNC: 15.8 G/DL (ref 12–16)
MCH RBC QN AUTO: 31.5 PG (ref 27–31.3)
MCHC RBC AUTO-ENTMCNC: 33.2 % (ref 33–37)
MCV RBC AUTO: 94.8 FL (ref 79.4–94.8)
PLATELET # BLD AUTO: 244 K/UL (ref 130–400)
POTASSIUM SERPL-SCNC: 4.3 MEQ/L (ref 3.4–4.9)
PROT SERPL-MCNC: 7.6 G/DL (ref 6.3–8)
RBC # BLD AUTO: 5.02 M/UL (ref 4.2–5.4)
SODIUM SERPL-SCNC: 138 MEQ/L (ref 135–144)
WBC # BLD AUTO: 6.3 K/UL (ref 4.8–10.8)

## 2023-09-18 NOTE — PROGRESS NOTES
Pt presented for scheduled appointment with CM. CM completed Semi-Annual Review, no changes noted to RW Part A Eligibility for services. CM updated ISP, pt agreed with POC. Plan of Care includes Medical, Dental, Mental Health, Knowledge of HIV and Social Support. CM assisted pt with changing [place] Dual Advantage Medicare/Medicaid Coverage. As of October 1, 2023, [facility] will no longer be a Network Provider. CM assisted with calling [employer] to apply for COTA Track. Pt will have no out of pocket costs. Benefits will include $155.00 per month to utilize for groceries, OTC meds and/or utilities. Per Himanshu Moreno, at [employer], eligibility for coverage will begin October 1, 2023. CM will follow up with pt as needed.

## 2023-09-19 LAB
CD3+CD4+ CELLS # BLD: 965 CELLS/UL (ref 430–1800)
CD4 % HELPER T CELL: 63 % (ref 32–64)
IMMUNODEFICIENCY MARKERS SPEC-IMP: NORMAL

## 2023-09-20 LAB
HIV-1 RNA BY PCR, QN: NOT DETECTED
SOURCE: NORMAL

## 2023-09-22 ENCOUNTER — HOSPITAL ENCOUNTER (OUTPATIENT)
Dept: WOMENS IMAGING | Age: 63
End: 2023-09-22
Attending: OBSTETRICS & GYNECOLOGY
Payer: MEDICARE

## 2023-09-22 DIAGNOSIS — Z12.31 VISIT FOR SCREENING MAMMOGRAM: ICD-10-CM

## 2023-09-22 PROCEDURE — 77063 BREAST TOMOSYNTHESIS BI: CPT

## 2023-09-26 ENCOUNTER — TELEPHONE (OUTPATIENT)
Dept: INFECTIOUS DISEASES | Age: 63
End: 2023-09-26

## 2023-09-26 NOTE — TELEPHONE ENCOUNTER
Pt called in with housing concerns. States her landlord raised her rent to 725. She had been paying 450   They offered her a one bedroom at 625. Providied active listening and support as pt discussed housing concerns. Discussed need to call [facility] . Pt is established with [address] [place] already. Pt will call [facility] to discuss assistance.

## 2023-09-27 ENCOUNTER — OFFICE VISIT (OUTPATIENT)
Dept: INFECTIOUS DISEASES | Age: 63
End: 2023-09-27
Payer: MEDICARE

## 2023-09-27 VITALS
RESPIRATION RATE: 14 BRPM | DIASTOLIC BLOOD PRESSURE: 80 MMHG | OXYGEN SATURATION: 97 % | BODY MASS INDEX: 26.17 KG/M2 | HEIGHT: 61 IN | TEMPERATURE: 98.5 F | HEART RATE: 85 BPM | SYSTOLIC BLOOD PRESSURE: 124 MMHG | WEIGHT: 138.6 LBS

## 2023-09-27 DIAGNOSIS — Z71.89 COUNSELING ON HEALTH PROMOTION AND DISEASE PREVENTION: ICD-10-CM

## 2023-09-27 DIAGNOSIS — E55.9 VITAMIN D DEFICIENCY, UNSPECIFIED: ICD-10-CM

## 2023-09-27 DIAGNOSIS — K57.32 DIVERTICULITIS OF LARGE INTESTINE WITHOUT PERFORATION OR ABSCESS WITHOUT BLEEDING: ICD-10-CM

## 2023-09-27 DIAGNOSIS — E78.5 HYPERLIPIDEMIA, UNSPECIFIED HYPERLIPIDEMIA TYPE: Primary | ICD-10-CM

## 2023-09-27 DIAGNOSIS — R10.32 LEFT LOWER QUADRANT PAIN: ICD-10-CM

## 2023-09-27 DIAGNOSIS — B20 HIV INFECTION, UNSPECIFIED SYMPTOM STATUS (HCC): ICD-10-CM

## 2023-09-27 PROCEDURE — 3078F DIAST BP <80 MM HG: CPT | Performed by: INTERNAL MEDICINE

## 2023-09-27 PROCEDURE — 3017F COLORECTAL CA SCREEN DOC REV: CPT | Performed by: INTERNAL MEDICINE

## 2023-09-27 PROCEDURE — 99214 OFFICE O/P EST MOD 30 MIN: CPT | Performed by: INTERNAL MEDICINE

## 2023-09-27 PROCEDURE — G8417 CALC BMI ABV UP PARAM F/U: HCPCS | Performed by: INTERNAL MEDICINE

## 2023-09-27 PROCEDURE — 3074F SYST BP LT 130 MM HG: CPT | Performed by: INTERNAL MEDICINE

## 2023-09-27 PROCEDURE — 1036F TOBACCO NON-USER: CPT | Performed by: INTERNAL MEDICINE

## 2023-09-27 PROCEDURE — G8428 CUR MEDS NOT DOCUMENT: HCPCS | Performed by: INTERNAL MEDICINE

## 2023-09-27 RX ORDER — VALSARTAN 40 MG/1
40 TABLET ORAL DAILY
COMMUNITY
Start: 2023-07-10

## 2023-09-27 ASSESSMENT — PATIENT HEALTH QUESTIONNAIRE - PHQ9
2. FEELING DOWN, DEPRESSED OR HOPELESS: 1
SUM OF ALL RESPONSES TO PHQ QUESTIONS 1-9: 2
1. LITTLE INTEREST OR PLEASURE IN DOING THINGS: 1
SUM OF ALL RESPONSES TO PHQ9 QUESTIONS 1 & 2: 2
SUM OF ALL RESPONSES TO PHQ QUESTIONS 1-9: 2

## 2023-09-27 NOTE — PROGRESS NOTES
Routine apt today. Compliant with medications --- yes. No misses     Flu vaccine discussed. [facility] when available     Pneumonia vacc? -- completed -- up to date  encouraged vaccine and education provided. Hep B vaccine/screening --  completed     Smoking? no     Etoh? no     Drugs? no     PCP? yes        Housing? -- has to move. Rent went up. Working with [facility] for assistance     Dental-- Encouraged -- needs to go. >1 year    Mental Health -- PABLITO     OBGYN: reviewed importance and encouraged follow up. 8.2022 Dr Ankur De Jesus    Colonoscopy: needs. 10.4.23 apt to discuss colonoscopy. Main complaint today is diverticulitis concerns             Reviewed with pt  U=U (undetectable equals un-transmittable) is a message used in HIV campaigns. U=U depends on adhering to daily ART and maintaining an undetectable viral load. Safe sex practices are still encouraged to prevent transmission of other STI's, such as, chlamydia, herpes, gonorrhea, and syphilis. U=U was discussed at today's appointment.  Pt  verbalizes understanding of U=U.

## 2023-10-05 DIAGNOSIS — K57.32 DIVERTICULITIS OF LARGE INTESTINE WITHOUT PERFORATION OR ABSCESS WITHOUT BLEEDING: ICD-10-CM

## 2023-10-05 DIAGNOSIS — K58.1 IRRITABLE BOWEL SYNDROME WITH CONSTIPATION: Primary | ICD-10-CM

## 2023-10-05 NOTE — PROGRESS NOTES
[facility] for GI consult per Dr Rebeka Machuca has chronic issues. Requesting colonoscopy and referral . Pt has order from PCP as well. Requesting [facility] doctor. Referral reviewed with pt. She will call if further issues arise. Of note she will be moving next month.  She will call office with updated information

## 2023-11-08 ENCOUNTER — PREP FOR PROCEDURE (OUTPATIENT)
Dept: GASTROENTEROLOGY | Age: 63
End: 2023-11-08

## 2023-11-08 ENCOUNTER — OFFICE VISIT (OUTPATIENT)
Dept: GASTROENTEROLOGY | Age: 63
End: 2023-11-08
Payer: COMMERCIAL

## 2023-11-08 VITALS
OXYGEN SATURATION: 97 % | BODY MASS INDEX: 25.13 KG/M2 | SYSTOLIC BLOOD PRESSURE: 112 MMHG | DIASTOLIC BLOOD PRESSURE: 74 MMHG | WEIGHT: 133 LBS

## 2023-11-08 DIAGNOSIS — K59.00 CONSTIPATION, UNSPECIFIED CONSTIPATION TYPE: Primary | ICD-10-CM

## 2023-11-08 PROCEDURE — G8484 FLU IMMUNIZE NO ADMIN: HCPCS | Performed by: INTERNAL MEDICINE

## 2023-11-08 PROCEDURE — G8417 CALC BMI ABV UP PARAM F/U: HCPCS | Performed by: INTERNAL MEDICINE

## 2023-11-08 PROCEDURE — G8427 DOCREV CUR MEDS BY ELIG CLIN: HCPCS | Performed by: INTERNAL MEDICINE

## 2023-11-08 PROCEDURE — 99204 OFFICE O/P NEW MOD 45 MIN: CPT | Performed by: INTERNAL MEDICINE

## 2023-11-08 PROCEDURE — 3017F COLORECTAL CA SCREEN DOC REV: CPT | Performed by: INTERNAL MEDICINE

## 2023-11-08 PROCEDURE — 3074F SYST BP LT 130 MM HG: CPT | Performed by: INTERNAL MEDICINE

## 2023-11-08 PROCEDURE — 3078F DIAST BP <80 MM HG: CPT | Performed by: INTERNAL MEDICINE

## 2023-11-08 PROCEDURE — 1036F TOBACCO NON-USER: CPT | Performed by: INTERNAL MEDICINE

## 2023-11-08 RX ORDER — POLYETHYLENE GLYCOL 3350, SODIUM CHLORIDE, SODIUM BICARBONATE, POTASSIUM CHLORIDE 420; 11.2; 5.72; 1.48 G/4L; G/4L; G/4L; G/4L
4000 POWDER, FOR SOLUTION ORAL ONCE
Qty: 1 EACH | Refills: 0 | Status: SHIPPED | OUTPATIENT
Start: 2023-11-08 | End: 2023-11-08

## 2023-11-08 ASSESSMENT — ENCOUNTER SYMPTOMS
CHEST TIGHTNESS: 0
VOMITING: 0
CONSTIPATION: 1
VOICE CHANGE: 0
ABDOMINAL PAIN: 0
SHORTNESS OF BREATH: 0
EYE PAIN: 0
EYE REDNESS: 0
WHEEZING: 0
TROUBLE SWALLOWING: 0
NAUSEA: 0
DIARRHEA: 0
COLOR CHANGE: 0
RECTAL PAIN: 0
BLOOD IN STOOL: 0
PHOTOPHOBIA: 0
ABDOMINAL DISTENTION: 0

## 2023-11-13 NOTE — PROGRESS NOTES
Pt presented pt office for scheduled apt. Here today for Flu vaccine. Doing ok. No issues. No issues with medications. Vaccine Information Sheet, \"Influenza - Inactivated\"  given to Marshall Doctor, or parent/legal guardian of  Marshall Doctor and verbalized understanding. Patient responses:    Have you ever had a reaction to a flu vaccine? No  Are you able to eat eggs without adverse effects? Yes  Do you have any current illness? No  Have you ever had Guillian [place] Syndrome? No    Flu vaccine given per order. Please see immunization tab. After obtaining consent, and per orders of Dr. Maximo Cary, injection of  0.5  m.l   FLU VACCINE  Was administered to RIGHT deltoid. Manf:  Darrion Faust  Lot #: [id number] EXP: June 30, 2022  Frank Campa [address]: [id number]    Patient tolerated well. Denies pain at site. Patient instructed to remain in clinic for 5-10 minutes afterwards, and to report any adverse reaction to me immediately. PPE provided and discussed with pt. Provided face mask ----  yes  Cloth masks--  0  Surgical masks--- 50  hand  -- 0  cleaning wipes -- 0  Toilet paper -- 4  RW Part A Provision, to reduce the spread of Covid-19.      Pt denies any other issues or concerns that need doctor attention and will call office if needed Alert and oriented to person, place and time

## 2023-11-22 ENCOUNTER — TELEPHONE (OUTPATIENT)
Dept: INFECTIOUS DISEASES | Age: 63
End: 2023-11-22

## 2023-11-22 NOTE — TELEPHONE ENCOUNTER
Pt called in requesting apt for flu vaccine  Scheduled for 11.27.23    Provided active listening as pt discussed family issues.      Denies issues or concerns

## 2023-11-27 ENCOUNTER — NURSE ONLY (OUTPATIENT)
Dept: INFECTIOUS DISEASES | Age: 63
End: 2023-11-27
Payer: MEDICARE

## 2023-11-27 DIAGNOSIS — B20 HIV INFECTION, UNSPECIFIED SYMPTOM STATUS (HCC): Primary | ICD-10-CM

## 2023-11-27 PROBLEM — H93.13 TINNITUS, BILATERAL: Status: ACTIVE | Noted: 2022-05-19

## 2023-11-27 PROBLEM — H90.A22 SENSORINEURAL HEARING LOSS (SNHL) OF LEFT EAR WITH RESTRICTED HEARING OF RIGHT EAR: Status: ACTIVE | Noted: 2022-05-19

## 2023-11-27 PROBLEM — F41.1 GAD (GENERALIZED ANXIETY DISORDER): Status: ACTIVE | Noted: 2023-06-09

## 2023-11-27 PROCEDURE — G0008 ADMIN INFLUENZA VIRUS VAC: HCPCS | Performed by: INTERNAL MEDICINE

## 2023-11-27 PROCEDURE — 90674 CCIIV4 VAC NO PRSV 0.5 ML IM: CPT | Performed by: INTERNAL MEDICINE

## 2023-11-27 RX ORDER — EZETIMIBE 10 MG/1
TABLET ORAL
COMMUNITY
Start: 2023-11-15

## 2023-11-27 NOTE — PROGRESS NOTES
Pt presented pt office for scheduled apt. Here today for Flu vaccine. Doing ok. No issues. No issues with medications. Provided active listening as pt discussed health issues and upcoming procedure. Patient responses:    Have you ever had a reaction to a flu vaccine? No  Are you able to eat eggs without adverse effects? Yes  Do you have any current illness? No  Have you ever had Guillian [place] Syndrome? No    Flu vaccine given per order. Please see immunization tab. After obtaining consent, and per orders of Dr. Macy Cool, injection of  0.5  m.l   FLU VACCINE  Was administered to left deltoid. Cameron:  Ileana Edvin #: [id number] EXP: June 30, 2024  [address]: [id number]    Patient tolerated well. Denies pain at site. Patient instructed to remain in clinic for total of 20 minutes afterwards, and to report any adverse reaction to me immediately. Will call office if needed.

## 2023-12-14 ENCOUNTER — TELEPHONE (OUTPATIENT)
Dept: GASTROENTEROLOGY | Age: 63
End: 2023-12-14

## 2023-12-14 NOTE — TELEPHONE ENCOUNTER
Called patient to discuss colonoscopy pre procedure protocols, no answer and unable to LVM as it was not activated

## 2023-12-20 RX ORDER — SODIUM CHLORIDE 9 MG/ML
INJECTION, SOLUTION INTRAVENOUS CONTINUOUS
OUTPATIENT
Start: 2023-12-20

## 2023-12-20 RX ORDER — SODIUM CHLORIDE 9 MG/ML
INJECTION, SOLUTION INTRAVENOUS PRN
OUTPATIENT
Start: 2023-12-20

## 2023-12-20 RX ORDER — SODIUM CHLORIDE 0.9 % (FLUSH) 0.9 %
5-40 SYRINGE (ML) INJECTION EVERY 12 HOURS SCHEDULED
OUTPATIENT
Start: 2023-12-20

## 2024-01-10 ENCOUNTER — TELEPHONE (OUTPATIENT)
Dept: INFECTIOUS DISEASES | Age: 64
End: 2024-01-10

## 2024-01-10 NOTE — TELEPHONE ENCOUNTER
Pt called in wanting to discuss multiple things.   Family issues/stress  Feeling like she has cold  Congestion and sore throat at time.   Has not tried OTC meds yet.   Encouraged OTC mucenex and hydration.   Feeling better today than this weekend.   Will call if issues arise or condition worsens.   Will be getting hearing aid soon.   Doing ok with meds, misses occasionally. - compliance encouraged,     Will call office if needed

## 2024-01-18 ENCOUNTER — TELEPHONE (OUTPATIENT)
Dept: INFECTIOUS DISEASES | Age: 64
End: 2024-01-18

## 2024-01-18 NOTE — TELEPHONE ENCOUNTER
Called in to talk    Hearing aid apt next week.   Provided active listening as pt discussed home stress.  Misses Wynne occasionally.

## 2024-01-25 DIAGNOSIS — E78.5 HYPERLIPIDEMIA, UNSPECIFIED HYPERLIPIDEMIA TYPE: ICD-10-CM

## 2024-01-25 DIAGNOSIS — B20 HIV INFECTION, UNSPECIFIED SYMPTOM STATUS (HCC): ICD-10-CM

## 2024-01-25 DIAGNOSIS — E55.9 VITAMIN D DEFICIENCY, UNSPECIFIED: ICD-10-CM

## 2024-01-25 LAB
ALBUMIN SERPL-MCNC: 4.3 G/DL (ref 3.5–4.6)
ALP SERPL-CCNC: 76 U/L (ref 40–130)
ALT SERPL-CCNC: 29 U/L (ref 0–33)
ANION GAP SERPL CALCULATED.3IONS-SCNC: 11 MEQ/L (ref 9–15)
AST SERPL-CCNC: 25 U/L (ref 0–35)
BILIRUB SERPL-MCNC: 0.3 MG/DL (ref 0.2–0.7)
BUN SERPL-MCNC: 15 MG/DL (ref 8–23)
CALCIUM SERPL-MCNC: 9.1 MG/DL (ref 8.5–9.9)
CHLORIDE SERPL-SCNC: 99 MEQ/L (ref 95–107)
CHOLEST SERPL-MCNC: 199 MG/DL (ref 0–199)
CO2 SERPL-SCNC: 30 MEQ/L (ref 20–31)
CREAT SERPL-MCNC: 0.77 MG/DL (ref 0.5–0.9)
ERYTHROCYTE [DISTWIDTH] IN BLOOD BY AUTOMATED COUNT: 13.1 % (ref 11.5–14.5)
GLOBULIN SER CALC-MCNC: 2.9 G/DL (ref 2.3–3.5)
GLUCOSE SERPL-MCNC: 94 MG/DL (ref 70–99)
HCT VFR BLD AUTO: 45 % (ref 37–47)
HDLC SERPL-MCNC: 52 MG/DL (ref 40–59)
HGB BLD-MCNC: 14.6 G/DL (ref 12–16)
LDLC SERPL CALC-MCNC: 110 MG/DL (ref 0–129)
MCH RBC QN AUTO: 31.3 PG (ref 27–31.3)
MCHC RBC AUTO-ENTMCNC: 32.4 % (ref 33–37)
MCV RBC AUTO: 96.6 FL (ref 79.4–94.8)
PLATELET # BLD AUTO: 253 K/UL (ref 130–400)
POTASSIUM SERPL-SCNC: 4.3 MEQ/L (ref 3.4–4.9)
PROT SERPL-MCNC: 7.2 G/DL (ref 6.3–8)
RBC # BLD AUTO: 4.66 M/UL (ref 4.2–5.4)
SODIUM SERPL-SCNC: 140 MEQ/L (ref 135–144)
TRIGL SERPL-MCNC: 184 MG/DL (ref 0–150)
WBC # BLD AUTO: 8.6 K/UL (ref 4.8–10.8)

## 2024-01-26 ENCOUNTER — TELEPHONE (OUTPATIENT)
Dept: INFECTIOUS DISEASES | Age: 64
End: 2024-01-26

## 2024-01-26 LAB
HIV-1 RNA BY PCR, QN: NOT DETECTED
RPR SER QL: NORMAL
SOURCE: NORMAL
VITAMIN D 25-HYDROXY: 41.9 NG/ML

## 2024-01-26 NOTE — TELEPHONE ENCOUNTER
Pt called in requesting to review recent lab work.  Lipids reviewed -- trigs increased. Reviewed diet. She micki cut out more fried foods. Encouraged increased activity!     Reviewed resulted labs. Pt denied questions or concerns.     Adherence-- misses here and there     Denies any needs at this time. Denies any issues that need Doctor attention. Will call with concerns

## 2024-01-27 LAB
CD3+CD4+ CELLS # BLD: 834 CELLS/UL (ref 430–1800)
CD4 % HELPER T CELL: 61 % (ref 32–64)
IMMUNODEFICIENCY MARKERS SPEC-IMP: NORMAL

## 2024-02-07 ENCOUNTER — TELEPHONE (OUTPATIENT)
Dept: GASTROENTEROLOGY | Age: 64
End: 2024-02-07

## 2024-02-07 RX ORDER — POLYETHYLENE GLYCOL 3350, SODIUM CHLORIDE, SODIUM BICARBONATE, POTASSIUM CHLORIDE 420; 11.2; 5.72; 1.48 G/4L; G/4L; G/4L; G/4L
4000 POWDER, FOR SOLUTION ORAL ONCE
Qty: 4000 ML | Refills: 0 | Status: SHIPPED | OUTPATIENT
Start: 2024-02-07 | End: 2024-02-07

## 2024-03-15 ENCOUNTER — ANESTHESIA EVENT (OUTPATIENT)
Dept: ENDOSCOPY | Age: 64
End: 2024-03-15
Payer: COMMERCIAL

## 2024-03-15 ENCOUNTER — HOSPITAL ENCOUNTER (OUTPATIENT)
Age: 64
Setting detail: OUTPATIENT SURGERY
Discharge: HOME | End: 2024-03-15
Attending: INTERNAL MEDICINE | Admitting: INTERNAL MEDICINE
Payer: COMMERCIAL

## 2024-03-15 ENCOUNTER — ANESTHESIA (OUTPATIENT)
Dept: ENDOSCOPY | Age: 64
End: 2024-03-15
Payer: COMMERCIAL

## 2024-03-15 VITALS
TEMPERATURE: 97.6 F | HEART RATE: 58 BPM | HEIGHT: 61 IN | WEIGHT: 130 LBS | OXYGEN SATURATION: 100 % | DIASTOLIC BLOOD PRESSURE: 57 MMHG | SYSTOLIC BLOOD PRESSURE: 102 MMHG | BODY MASS INDEX: 24.55 KG/M2 | RESPIRATION RATE: 18 BRPM

## 2024-03-15 PROBLEM — K57.90 DIVERTICULOSIS: Status: ACTIVE | Noted: 2024-03-15

## 2024-03-15 PROCEDURE — 2500000003 HC RX 250 WO HCPCS: Performed by: NURSE ANESTHETIST, CERTIFIED REGISTERED

## 2024-03-15 PROCEDURE — 2709999900 HC NON-CHARGEABLE SUPPLY: Performed by: INTERNAL MEDICINE

## 2024-03-15 PROCEDURE — 3609027000 HC COLONOSCOPY: Performed by: INTERNAL MEDICINE

## 2024-03-15 PROCEDURE — 3700000000 HC ANESTHESIA ATTENDED CARE: Performed by: INTERNAL MEDICINE

## 2024-03-15 PROCEDURE — 7100000010 HC PHASE II RECOVERY - FIRST 15 MIN: Performed by: INTERNAL MEDICINE

## 2024-03-15 PROCEDURE — 6360000002 HC RX W HCPCS: Performed by: NURSE ANESTHETIST, CERTIFIED REGISTERED

## 2024-03-15 PROCEDURE — 2580000003 HC RX 258: Performed by: INTERNAL MEDICINE

## 2024-03-15 PROCEDURE — 7100000011 HC PHASE II RECOVERY - ADDTL 15 MIN: Performed by: INTERNAL MEDICINE

## 2024-03-15 PROCEDURE — 3700000001 HC ADD 15 MINUTES (ANESTHESIA): Performed by: INTERNAL MEDICINE

## 2024-03-15 PROCEDURE — 2580000003 HC RX 258

## 2024-03-15 RX ORDER — PROPOFOL 10 MG/ML
INJECTION, EMULSION INTRAVENOUS CONTINUOUS PRN
Status: DISCONTINUED | OUTPATIENT
Start: 2024-03-15 | End: 2024-03-15 | Stop reason: SDUPTHER

## 2024-03-15 RX ORDER — SODIUM CHLORIDE 0.9 % (FLUSH) 0.9 %
5-40 SYRINGE (ML) INJECTION PRN
Status: CANCELLED | OUTPATIENT
Start: 2024-03-15

## 2024-03-15 RX ORDER — SODIUM CHLORIDE 0.9 % (FLUSH) 0.9 %
5-40 SYRINGE (ML) INJECTION EVERY 12 HOURS SCHEDULED
Status: CANCELLED | OUTPATIENT
Start: 2024-03-15

## 2024-03-15 RX ORDER — MAGNESIUM HYDROXIDE 1200 MG/15ML
LIQUID ORAL PRN
Status: DISCONTINUED | OUTPATIENT
Start: 2024-03-15 | End: 2024-03-15 | Stop reason: ALTCHOICE

## 2024-03-15 RX ORDER — SODIUM CHLORIDE 9 MG/ML
INJECTION, SOLUTION INTRAVENOUS PRN
Status: DISCONTINUED | OUTPATIENT
Start: 2024-03-15 | End: 2024-03-15 | Stop reason: HOSPADM

## 2024-03-15 RX ORDER — NALOXONE HYDROCHLORIDE 0.4 MG/ML
INJECTION, SOLUTION INTRAMUSCULAR; INTRAVENOUS; SUBCUTANEOUS PRN
Status: CANCELLED | OUTPATIENT
Start: 2024-03-15

## 2024-03-15 RX ORDER — SODIUM CHLORIDE 9 MG/ML
INJECTION, SOLUTION INTRAVENOUS CONTINUOUS
Status: DISCONTINUED | OUTPATIENT
Start: 2024-03-15 | End: 2024-03-15 | Stop reason: HOSPADM

## 2024-03-15 RX ORDER — SODIUM CHLORIDE 0.9 % (FLUSH) 0.9 %
5-40 SYRINGE (ML) INJECTION EVERY 12 HOURS SCHEDULED
Status: DISCONTINUED | OUTPATIENT
Start: 2024-03-15 | End: 2024-03-15 | Stop reason: HOSPADM

## 2024-03-15 RX ORDER — SODIUM CHLORIDE 9 MG/ML
INJECTION, SOLUTION INTRAVENOUS PRN
Status: CANCELLED | OUTPATIENT
Start: 2024-03-15

## 2024-03-15 RX ORDER — MIDAZOLAM HYDROCHLORIDE 1 MG/ML
INJECTION INTRAMUSCULAR; INTRAVENOUS PRN
Status: DISCONTINUED | OUTPATIENT
Start: 2024-03-15 | End: 2024-03-15 | Stop reason: SDUPTHER

## 2024-03-15 RX ORDER — LIDOCAINE HYDROCHLORIDE 20 MG/ML
INJECTION, SOLUTION INFILTRATION; PERINEURAL PRN
Status: DISCONTINUED | OUTPATIENT
Start: 2024-03-15 | End: 2024-03-15 | Stop reason: SDUPTHER

## 2024-03-15 RX ORDER — MIDAZOLAM HYDROCHLORIDE 1 MG/ML
INJECTION INTRAMUSCULAR; INTRAVENOUS
Status: COMPLETED
Start: 2024-03-15 | End: 2024-03-15

## 2024-03-15 RX ORDER — SODIUM CHLORIDE 9 MG/ML
INJECTION, SOLUTION INTRAVENOUS
Status: COMPLETED
Start: 2024-03-15 | End: 2024-03-15

## 2024-03-15 RX ADMIN — MIDAZOLAM HYDROCHLORIDE 2 MG: 1 INJECTION, SOLUTION INTRAMUSCULAR; INTRAVENOUS at 09:55

## 2024-03-15 RX ADMIN — SODIUM CHLORIDE 500 ML: 9 INJECTION, SOLUTION INTRAVENOUS at 09:17

## 2024-03-15 RX ADMIN — PROPOFOL 100 MCG/KG/MIN: 10 INJECTION, EMULSION INTRAVENOUS at 09:59

## 2024-03-15 RX ADMIN — LIDOCAINE HYDROCHLORIDE 20 MG: 20 INJECTION, SOLUTION INFILTRATION; PERINEURAL at 09:58

## 2024-03-15 ASSESSMENT — PAIN - FUNCTIONAL ASSESSMENT: PAIN_FUNCTIONAL_ASSESSMENT: NONE - DENIES PAIN

## 2024-03-15 NOTE — ANESTHESIA PRE PROCEDURE
3 FB   Dental: normal exam         Pulmonary:normal exam    (+)  COPD:    sleep apnea:                                  Cardiovascular:  Exercise tolerance: good (>4 METS)  (+) hypertension:                  Neuro/Psych:   (+) neuromuscular disease:, headaches:, psychiatric history:            GI/Hepatic/Renal: Neg GI/Hepatic/Renal ROS            Endo/Other:    (+) : arthritis:..                 Abdominal: normal exam            Vascular: negative vascular ROS.         Other Findings:             Anesthesia Plan      MAC     ASA 3       Induction: intravenous.      Anesthetic plan and risks discussed with patient.    Use of blood products discussed with patient whom.    Plan discussed with CRNA.                    JULIANA GUADARRAMA - CRNA   3/15/2024

## 2024-03-15 NOTE — H&P
Exam:  Cardiac:  [x]WNL  []Comments:  Pulmonary:  [x]WNL   []Comments:   Neuro/Mental Status:  [x]WNL  []Comments:  Abdominal:  [x]WNL    []Comments:  Other:   []WNL  []Comments:    Informed Consent:  The risks and benefits of the procedure have been discussed with either the patient or if they cannot consent, their representative.    Assessment:  Patient examined and appropriate for planned sedation and procedure.     Plan:  Proceed with planned sedation and procedure as above.    Thelma Palacios MD  9:52 AM

## 2024-03-15 NOTE — ANESTHESIA POSTPROCEDURE EVALUATION
Department of Anesthesiology  Postprocedure Note    Patient: Alesha Miller  MRN: [id number]  YOB: 1960  Date of evaluation: 3/15/2024    Procedure Summary       Date: 03/15/24 Room / Location: [facility] OR 01 / [facility]    Anesthesia Start: 0955 Anesthesia Stop: 1021    Procedure: COLONOSCOPY DIAGNOSTIC Diagnosis:       Constipation      (Constipation [K59.00])    Surgeons: Thelma Palacios MD Responsible Provider: Rajinder Diaz APRN - CRNA    Anesthesia Type: MAC ASA Status: 3            Anesthesia Type: MAC    Ida Phase I: Ida Score: 10    Ida Phase II:      Anesthesia Post Evaluation    Patient location during evaluation: bedside  Patient participation: complete - patient participated  Level of consciousness: awake and alert  Pain score: 0  Airway patency: patent  Nausea & Vomiting: no nausea and no vomiting  Cardiovascular status: blood pressure returned to baseline and hemodynamically stable  Respiratory status: acceptable and face mask  Hydration status: euvolemic  Pain management: adequate        No notable events documented.

## 2024-03-20 ENCOUNTER — TELEPHONE (OUTPATIENT)
Dept: INFECTIOUS DISEASES | Age: 64
End: 2024-03-20

## 2024-03-20 NOTE — TELEPHONE ENCOUNTER
Pt called in with updates  Had colonoscopy. Anxiety relief  Diet changes. Diverticulitis history.   Requested to schedule 1x1 3.28.24 at 1    Dr Cassidy is primary dentist   Went to [facility] and scheduled apt 4.16.24

## 2024-03-26 ENCOUNTER — OFFICE VISIT (OUTPATIENT)
Dept: GASTROENTEROLOGY | Age: 64
End: 2024-03-26
Payer: COMMERCIAL

## 2024-03-26 VITALS
BODY MASS INDEX: 23.82 KG/M2 | DIASTOLIC BLOOD PRESSURE: 60 MMHG | WEIGHT: 126 LBS | HEART RATE: 84 BPM | OXYGEN SATURATION: 98 % | SYSTOLIC BLOOD PRESSURE: 100 MMHG

## 2024-03-26 DIAGNOSIS — K59.00 CONSTIPATION, UNSPECIFIED CONSTIPATION TYPE: Primary | ICD-10-CM

## 2024-03-26 PROCEDURE — 3078F DIAST BP <80 MM HG: CPT | Performed by: INTERNAL MEDICINE

## 2024-03-26 PROCEDURE — 99213 OFFICE O/P EST LOW 20 MIN: CPT | Performed by: INTERNAL MEDICINE

## 2024-03-26 PROCEDURE — 3017F COLORECTAL CA SCREEN DOC REV: CPT | Performed by: INTERNAL MEDICINE

## 2024-03-26 PROCEDURE — 1036F TOBACCO NON-USER: CPT | Performed by: INTERNAL MEDICINE

## 2024-03-26 PROCEDURE — 3074F SYST BP LT 130 MM HG: CPT | Performed by: INTERNAL MEDICINE

## 2024-03-26 PROCEDURE — G8482 FLU IMMUNIZE ORDER/ADMIN: HCPCS | Performed by: INTERNAL MEDICINE

## 2024-03-26 PROCEDURE — G8427 DOCREV CUR MEDS BY ELIG CLIN: HCPCS | Performed by: INTERNAL MEDICINE

## 2024-03-26 PROCEDURE — G8420 CALC BMI NORM PARAMETERS: HCPCS | Performed by: INTERNAL MEDICINE

## 2024-03-26 ASSESSMENT — ENCOUNTER SYMPTOMS
PHOTOPHOBIA: 0
CONSTIPATION: 0
DIARRHEA: 0
EYE REDNESS: 0
RECTAL PAIN: 0
BLOOD IN STOOL: 0
TROUBLE SWALLOWING: 0
VOMITING: 0
SHORTNESS OF BREATH: 0
COLOR CHANGE: 0
ABDOMINAL DISTENTION: 0
WHEEZING: 0
ABDOMINAL PAIN: 0
NAUSEA: 0
CHEST TIGHTNESS: 0
VOICE CHANGE: 0
EYE PAIN: 0

## 2024-03-26 NOTE — PROGRESS NOTES
9/26/2024), or if symptoms worsen or fail to improve, for Post procedure results discussion, further management.      Thelma Palacios MD

## 2024-03-29 ENCOUNTER — NURSE ONLY (OUTPATIENT)
Dept: INFECTIOUS DISEASES | Age: 64
End: 2024-03-29

## 2024-03-29 DIAGNOSIS — B20 HIV INFECTION, UNSPECIFIED SYMPTOM STATUS (HCC): Primary | ICD-10-CM

## 2024-03-29 NOTE — PROGRESS NOTES
1x1 today.     Discussed multiple topics.   Feeling very tried all the time.     Discussed meds. She thinks BP might be a prob. Recommend her checking it before taking her med. She does have cuff at home. She will check and keep track. Her daughter will help.     Amanda for - feeling lonely a lot. Refuses depression medications. We did talk about this at length. She will think about talking to  about this more. She does not want grief consoling. (Death of hubs about 2 yrs ago)     Also asking if Zetia can cause tiredness.     She is taking Vitamins to help.       Provided active listening.     She has started talking to someone online. This does make her feel somewhat better.     She will keep track of BP and call office next week with updates. She has PCP follow up scheduled as well.

## 2024-04-02 ENCOUNTER — TELEPHONE (OUTPATIENT)
Dept: INFECTIOUS DISEASES | Age: 64
End: 2024-04-02

## 2024-04-02 NOTE — TELEPHONE ENCOUNTER
Pt called in wanting to talk.     Having concerns regarding new relationship. Online. He is over sees  Going on for almost 1 month.     He has asked her to send him money. She is worries this is a scam. Provided active listening and support. Discussed options to discuss further with him as needed. Encouraged pt NOT to send money unless she feels she knows him and comfortable. She is still questioning his intentions.   She will think about it and call back with update or needs.

## 2024-04-30 ENCOUNTER — TELEPHONE (OUTPATIENT)
Dept: INFECTIOUS DISEASES | Age: 64
End: 2024-04-30

## 2024-04-30 DIAGNOSIS — B20 HIV INFECTION, UNSPECIFIED SYMPTOM STATUS (HCC): Primary | ICD-10-CM

## 2024-04-30 NOTE — TELEPHONE ENCOUNTER
Call placed to pt as appt reminder. she  also needs lab work completed.     VM left as reminder regading apt/labs and to call office if apt does not work for hers schedule.

## 2024-05-07 DIAGNOSIS — B20 HIV INFECTION, UNSPECIFIED SYMPTOM STATUS (HCC): ICD-10-CM

## 2024-05-07 LAB
ALBUMIN SERPL-MCNC: 4.5 G/DL (ref 3.5–4.6)
ALP SERPL-CCNC: 66 U/L (ref 40–130)
ALT SERPL-CCNC: 16 U/L (ref 0–33)
ANION GAP SERPL CALCULATED.3IONS-SCNC: 11 MEQ/L (ref 9–15)
AST SERPL-CCNC: 15 U/L (ref 0–35)
BILIRUB SERPL-MCNC: 0.3 MG/DL (ref 0.2–0.7)
BUN SERPL-MCNC: 17 MG/DL (ref 8–23)
CALCIUM SERPL-MCNC: 9.3 MG/DL (ref 8.5–9.9)
CHLORIDE SERPL-SCNC: 99 MEQ/L (ref 95–107)
CO2 SERPL-SCNC: 29 MEQ/L (ref 20–31)
CREAT SERPL-MCNC: 0.82 MG/DL (ref 0.5–0.9)
ERYTHROCYTE [DISTWIDTH] IN BLOOD BY AUTOMATED COUNT: 13.3 % (ref 11.5–14.5)
GLOBULIN SER CALC-MCNC: 2.5 G/DL (ref 2.3–3.5)
GLUCOSE SERPL-MCNC: 85 MG/DL (ref 70–99)
HCT VFR BLD AUTO: 45.7 % (ref 37–47)
HGB BLD-MCNC: 15.1 G/DL (ref 12–16)
MCH RBC QN AUTO: 31.6 PG (ref 27–31.3)
MCHC RBC AUTO-ENTMCNC: 33 % (ref 33–37)
MCV RBC AUTO: 95.6 FL (ref 79.4–94.8)
PLATELET # BLD AUTO: 214 K/UL (ref 130–400)
POTASSIUM SERPL-SCNC: 4 MEQ/L (ref 3.4–4.9)
PROT SERPL-MCNC: 7 G/DL (ref 6.3–8)
RBC # BLD AUTO: 4.78 M/UL (ref 4.2–5.4)
SODIUM SERPL-SCNC: 139 MEQ/L (ref 135–144)
WBC # BLD AUTO: 5.7 K/UL (ref 4.8–10.8)

## 2024-05-08 LAB
CD3+CD4+ CELLS # BLD: 837 CELLS/UL (ref 430–1800)
CD4 % HELPER T CELL: 65 % (ref 32–64)
IMMUNODEFICIENCY MARKERS SPEC-IMP: ABNORMAL

## 2024-05-10 LAB
HIV-1 RNA BY PCR, QN: NOT DETECTED
SOURCE: NORMAL

## 2024-05-15 ENCOUNTER — HOSPITAL ENCOUNTER (EMERGENCY)
Age: 64
Discharge: HOME | End: 2024-05-15
Payer: MEDICARE

## 2024-05-15 ENCOUNTER — APPOINTMENT (OUTPATIENT)
Dept: GENERAL RADIOLOGY | Age: 64
End: 2024-05-15
Payer: MEDICARE

## 2024-05-15 VITALS
DIASTOLIC BLOOD PRESSURE: 81 MMHG | TEMPERATURE: 98.3 F | HEIGHT: 61 IN | SYSTOLIC BLOOD PRESSURE: 136 MMHG | RESPIRATION RATE: 18 BRPM | WEIGHT: 130 LBS | OXYGEN SATURATION: 96 % | HEART RATE: 67 BPM | BODY MASS INDEX: 24.55 KG/M2

## 2024-05-15 DIAGNOSIS — J06.9 VIRAL URI WITH COUGH: Primary | ICD-10-CM

## 2024-05-15 LAB
INFLUENZA A BY PCR: NEGATIVE
INFLUENZA B BY PCR: NEGATIVE
SARS-COV-2 RDRP RESP QL NAA+PROBE: NOT DETECTED
STREP GRP A PCR: NEGATIVE

## 2024-05-15 PROCEDURE — 87502 INFLUENZA DNA AMP PROBE: CPT

## 2024-05-15 PROCEDURE — 87635 SARS-COV-2 COVID-19 AMP PRB: CPT

## 2024-05-15 PROCEDURE — 87651 STREP A DNA AMP PROBE: CPT

## 2024-05-15 PROCEDURE — 99284 EMERGENCY DEPT VISIT MOD MDM: CPT

## 2024-05-15 PROCEDURE — 71046 X-RAY EXAM CHEST 2 VIEWS: CPT

## 2024-05-15 RX ORDER — BENZONATATE 200 MG/1
200 CAPSULE ORAL 3 TIMES DAILY PRN
Qty: 30 CAPSULE | Refills: 0 | Status: SHIPPED | OUTPATIENT
Start: 2024-05-15

## 2024-05-15 RX ORDER — FLUTICASONE PROPIONATE 50 MCG
2 SPRAY, SUSPENSION (ML) NASAL DAILY
Qty: 16 G | Refills: 0 | Status: SHIPPED | OUTPATIENT
Start: 2024-05-15 | End: 2024-05-15

## 2024-05-15 RX ORDER — CETIRIZINE HYDROCHLORIDE 10 MG/1
10 TABLET ORAL DAILY
Qty: 30 TABLET | Refills: 0 | Status: SHIPPED | OUTPATIENT
Start: 2024-05-15

## 2024-05-15 RX ORDER — CETIRIZINE HYDROCHLORIDE 10 MG/1
10 TABLET ORAL DAILY
Qty: 30 TABLET | Refills: 0 | Status: SHIPPED | OUTPATIENT
Start: 2024-05-15 | End: 2024-05-15

## 2024-05-15 RX ORDER — BENZONATATE 200 MG/1
200 CAPSULE ORAL 3 TIMES DAILY PRN
Qty: 30 CAPSULE | Refills: 0 | Status: SHIPPED | OUTPATIENT
Start: 2024-05-15 | End: 2024-05-15

## 2024-05-15 ASSESSMENT — PAIN DESCRIPTION - DESCRIPTORS: DESCRIPTORS: ACHING

## 2024-05-15 ASSESSMENT — PAIN - FUNCTIONAL ASSESSMENT: PAIN_FUNCTIONAL_ASSESSMENT: 0-10

## 2024-05-15 ASSESSMENT — PAIN DESCRIPTION - PAIN TYPE: TYPE: ACUTE PAIN

## 2024-05-15 ASSESSMENT — ENCOUNTER SYMPTOMS
COUGH: 1
RHINORRHEA: 1
SORE THROAT: 1

## 2024-05-15 ASSESSMENT — PAIN DESCRIPTION - LOCATION: LOCATION: THROAT

## 2024-05-15 ASSESSMENT — PAIN SCALES - GENERAL: PAINLEVEL_OUTOF10: 3

## 2024-05-15 NOTE — ED PROVIDER NOTES
Medical Decision Making  63-year-old female presents to ED for evaluation of generalized illness.  Patient is afebrile, hemodynamically stable, nontoxic.  COVID-19, influenza A/B, strep negative.  Patient is without drooling, trismus, stridor.  Chest x-ray negative for acute cardiopulmonary pathology.  Official radiology report pending at time of my interpretation.  This is likely viral etiology versus allergic in etiology.  Patient will be discharged home with prescription for Tessalon Perles, Zyrtec.  Patient educated on supportive care.  Patient given standard anticipatory guidance, return to ED warning signs, strict follow-up guidelines with PCP. Patient verbalized understanding of education, instruction. Patient is agreeable to plan. Patient discharged home in stable condition.    Problems Addressed:  Viral URI with cough: acute illness or injury    Amount and/or Complexity of Data Reviewed  Labs: ordered.  Radiology: ordered.    Risk  OTC drugs.  Prescription drug management.      REASSESSMENT      CRITICAL CARE TIME   Total Critical Care time was 0 minutes, excluding separately reportable procedures.  There was a high probability of clinically significant/life threatening deterioration in the patient's condition which required my urgent intervention.      CONSULTS:  None    PROCEDURES:  Unless otherwise noted below, none     Procedures    No LOS Charge filed     FINAL IMPRESSION      1. Viral URI with cough          DISPOSITION/PLAN   DISPOSITION Decision To Discharge 05/15/2024 01:44:23 PM      PATIENT REFERRED TO:  Daryl Salinas MD  [address] [facility] [address] [phone]    In 1 day        DISCHARGE MEDICATIONS:  Discharge Medication List as of 5/15/2024  1:46 PM        START taking these medications    Details   cetirizine (ZYRTEC) 10 MG tablet Take 1 tablet by mouth daily, Disp-30 tablet, R-0Normal      benzonatate (TESSALON) 200 MG capsule Take 1 capsule by mouth 3 times

## 2024-05-16 ENCOUNTER — OFFICE VISIT (OUTPATIENT)
Dept: INFECTIOUS DISEASES | Age: 64
End: 2024-05-16

## 2024-05-16 VITALS
SYSTOLIC BLOOD PRESSURE: 122 MMHG | BODY MASS INDEX: 24.21 KG/M2 | WEIGHT: 126.6 LBS | TEMPERATURE: 97.3 F | DIASTOLIC BLOOD PRESSURE: 71 MMHG

## 2024-05-16 DIAGNOSIS — B20 HUMAN IMMUNODEFICIENCY VIRUS (HCC): ICD-10-CM

## 2024-05-16 DIAGNOSIS — J06.9 VIRAL UPPER RESPIRATORY TRACT INFECTION: Primary | ICD-10-CM

## 2024-05-16 DIAGNOSIS — Z87.19 HISTORY OF DIVERTICULITIS: ICD-10-CM

## 2024-05-16 DIAGNOSIS — J06.9 VIRAL UPPER RESPIRATORY TRACT INFECTION: ICD-10-CM

## 2024-05-16 DIAGNOSIS — Z86.19 HISTORY OF HPV INFECTION: ICD-10-CM

## 2024-05-16 LAB
B PARAP IS1001 DNA NPH QL NAA+NON-PROBE: NOT DETECTED
B PERT.PT PRMT NPH QL NAA+NON-PROBE: NOT DETECTED
C PNEUM DNA NPH QL NAA+NON-PROBE: NOT DETECTED
FLUAV RNA NPH QL NAA+NON-PROBE: NOT DETECTED
FLUBV RNA NPH QL NAA+NON-PROBE: NOT DETECTED
HADV DNA NPH QL NAA+NON-PROBE: NOT DETECTED
HCOV 229E RNA NPH QL NAA+NON-PROBE: NOT DETECTED
HCOV HKU1 RNA NPH QL NAA+NON-PROBE: NOT DETECTED
HCOV NL63 RNA NPH QL NAA+NON-PROBE: NOT DETECTED
HCOV OC43 RNA NPH QL NAA+NON-PROBE: NOT DETECTED
HMPV RNA NPH QL NAA+NON-PROBE: DETECTED
HPIV1 RNA NPH QL NAA+NON-PROBE: NOT DETECTED
HPIV2 RNA NPH QL NAA+NON-PROBE: NOT DETECTED
HPIV3 RNA NPH QL NAA+NON-PROBE: NOT DETECTED
HPIV4 RNA NPH QL NAA+NON-PROBE: NOT DETECTED
M PNEUMO DNA NPH QL NAA+NON-PROBE: NOT DETECTED
RSV RNA NPH QL NAA+NON-PROBE: NOT DETECTED
RV+EV RNA NPH QL NAA+NON-PROBE: NOT DETECTED
SARS-COV-2 RNA NPH QL NAA+NON-PROBE: NOT DETECTED

## 2024-05-16 ASSESSMENT — PATIENT HEALTH QUESTIONNAIRE - PHQ9
SUM OF ALL RESPONSES TO PHQ QUESTIONS 1-9: 2
2. FEELING DOWN, DEPRESSED OR HOPELESS: SEVERAL DAYS
SUM OF ALL RESPONSES TO PHQ QUESTIONS 1-9: 2
SUM OF ALL RESPONSES TO PHQ QUESTIONS 1-9: 2
1. LITTLE INTEREST OR PLEASURE IN DOING THINGS: SEVERAL DAYS
SUM OF ALL RESPONSES TO PHQ9 QUESTIONS 1 & 2: 2
SUM OF ALL RESPONSES TO PHQ QUESTIONS 1-9: 2

## 2024-05-16 NOTE — PROGRESS NOTES
Cold Vs allergies  ER neg testing   Cough, clear speumpt    PABLITO - this past month. Doing ok   Dentist 2 weeks ago [facility] -- tooth pulled  Weight loss  Decreased appetite, feeling tired at times.   Upcoming apt with PCP in June. 2024 - colonoscopy    Hearing aid now-- can hear much better.      Forgets pills twice a week.     OBGYN- upcoming sept. Goes yearly     Discussed need tetnus and pnemo boosters  Reluctant to get vaccine  Education provided. Will think about it. Doesn't want to go to HD

## 2024-05-16 NOTE — PROGRESS NOTES
Infectious Disease Progress Note       5/16/2024      Background:  Diagnosed 2017  Symptoms started with UTI  WBCs have been low for years - possibility older infection   passed away.   Has had other sexual partners - per patient all negative.      No THC and no smoking. Periodic alcohol  No travel out of [place].   Hx of having a cat at home.   No TB history  Sexual hx: Had trichomonas in the past. Possible BV. +anal warts history  Has chronic urinary incontinence as discussed previously; hx of hearing impairment.   PCN allergy - years ago. Unknown allergic reaction  +hx of gout. No recent attack     Viral load suppressed.   Genvoya    Complains of upper respiratory infection, \"allergies\"  Flu and COVID-negative per ER  Was given allergy medication but has consistent cough and rhinorrhea.      Lab Results   Component Value Date    WBC 5.7 05/07/2024    HGB 15.1 05/07/2024    HCT 45.7 05/07/2024    MCV 95.6 (H) 05/07/2024     05/07/2024     Lab Results   Component Value Date/Time     05/07/2024 01:18 PM    K 4.0 05/07/2024 01:18 PM    CL 99 05/07/2024 01:18 PM    CO2 29 05/07/2024 01:18 PM    BUN 17 05/07/2024 01:18 PM    CREATININE 0.82 05/07/2024 01:18 PM    GLUCOSE 85 05/07/2024 01:18 PM    GLUCOSE 97 03/22/2012 12:21 PM    CALCIUM 9.3 05/07/2024 01:18 PM    LABGLOM 80.1 05/07/2024 01:18 PM    LABGLOM >60.0 01/25/2024 11:09 AM        WBC trends are being monitored. Antibiotic doses are being adjusted per most recent renal labs.     Vitals:    05/16/24 1357   BP: 122/71   Temp: 97.3 °F (36.3 °C)     Patient is awake and alert, NAD  Ears look ok.  No thrush in oropharynx  Neck supple  Chest crackling cough, wheezing slightly, no rhonchi  Heart S1S2  Abdomen ND, NTTP  Extrem no new changes, no pain  Expressions symmetrical  No obvious rashes.   Mood and affect appropriate.   Has a hearing aid, ears clear      Patient Active Problem List   Diagnosis    Irritable bowel syndrome    Elevated blood

## 2024-05-17 ENCOUNTER — TELEPHONE (OUTPATIENT)
Dept: INFECTIOUS DISEASES | Age: 64
End: 2024-05-17

## 2024-05-17 NOTE — TELEPHONE ENCOUNTER
Reviewed resp panel with Dr Jerry  + Human [place] pneumovirus  Lingers  Stay hydrated  Wear mask        Xray already done and is clear.     Spoke with pt.   Education regarding regarding virus at length.   She will stay hydrated. Denies fever or chills. Denies body aches.   Will continue to wear mask.   OTC cold med  She will call next week with update.

## 2024-05-21 ENCOUNTER — TELEPHONE (OUTPATIENT)
Dept: INFECTIOUS DISEASES | Age: 64
End: 2024-05-21

## 2024-05-21 NOTE — TELEPHONE ENCOUNTER
Call placed to pt. Health check  Doing much better now. No longer coughing.   Noted she is changing her insurance to [employer] 6.1.24. requested she verify benefits including med coverage. She verbalized understanding.

## 2024-06-04 ENCOUNTER — TELEPHONE (OUTPATIENT)
Dept: INFECTIOUS DISEASES | Age: 64
End: 2024-06-04

## 2024-06-04 NOTE — TELEPHONE ENCOUNTER
Per request, CM called pt.  Pt expressed desire to change insurance coverage from Isabella Oliver to [employer] because benefits are higher for OTC/Groceries/Utility Bill Payment.  Pt doeshave a phone appointment to discuss possibility of change with a representative from [employer] on 6/5/24 @ 7PM.  CM encouraged pt to inquire about coverage of Providers and medications.  CM will assist with change as needed.      Pt then discussed being \"sleepy all the time\".  CM reviewed meds with pt, found cholesterol medication may cause fatigue.  CM encouraged pt to discuss with PCP.  CM will follow up with pt as needed.

## 2024-06-13 ENCOUNTER — TELEPHONE (OUTPATIENT)
Dept: INFECTIOUS DISEASES | Age: 64
End: 2024-06-13

## 2024-06-13 NOTE — TELEPHONE ENCOUNTER
Pt called in wanting to talk about side effect of Genvoya. Pt has been stable on Genvoya x 3 years.   Has been working on figuring out which med is causing dry skin, stomach issues and some aches.     We reviewed all meds again. Dr Jerry has discussed this with pt and discussed med change. Pt did not want to change at that time. Worried about side effects    We discussed options to change ART again. Education provided regarding other medications, how and when to take them ect. She is open to discussing med change further.     This RN will discuss med concern with Dr Jerry and go from there. Pt is agreeable to POC and will call next week to discuss further.

## 2024-07-05 DIAGNOSIS — B20 HUMAN IMMUNODEFICIENCY VIRUS (HCC): Primary | ICD-10-CM

## 2024-07-05 NOTE — TELEPHONE ENCOUNTER
Talked with pt today about medications.   Current medication is going ok. \"I am doing fine\"   Will think about changing meds at next apt    Car issues right now.     Provided active listening as pt discussed multiple life issues.   Has been seeing someone for 1.5 months now.   Happier now. Not as lonely.     Compliance- misses occasionally.   Cholesterol med- stopped to many side effects.   Feeling better now since she stopped.   PCP    Did not change insurance.     1x1 scheduled per pt request 7.22.24 (in office or phone, what ever works best for pt)

## 2024-07-18 ENCOUNTER — NURSE ONLY (OUTPATIENT)
Dept: INFECTIOUS DISEASES | Age: 64
End: 2024-07-18

## 2024-07-18 ENCOUNTER — TELEPHONE (OUTPATIENT)
Dept: INFECTIOUS DISEASES | Age: 64
End: 2024-07-18

## 2024-07-18 DIAGNOSIS — B20 HIV INFECTION, UNSPECIFIED SYMPTOM STATUS (HCC): Primary | ICD-10-CM

## 2024-07-18 NOTE — PROGRESS NOTES
1x1 today instead of Monday.     Working on getting new place with [address] [employer]  Happy with new partner. Jeremie. Getting to know each other slowly.   Has been 2 month now. Provided active listening as pt discussed relationship dynamics.   Sex- hurting. Burning and painful. Will follow up/call OBGYN    New phone same number.   Ok with Genvoya still     Call time 30 mins  1x1 scheduled 7.31.24

## 2024-09-18 ENCOUNTER — HOSPITAL ENCOUNTER (EMERGENCY)
Age: 64
Discharge: HOME | End: 2024-09-18
Payer: MEDICARE

## 2024-09-18 VITALS
RESPIRATION RATE: 18 BRPM | HEART RATE: 79 BPM | BODY MASS INDEX: 25.82 KG/M2 | WEIGHT: 135 LBS | DIASTOLIC BLOOD PRESSURE: 75 MMHG | OXYGEN SATURATION: 99 % | TEMPERATURE: 98.6 F | SYSTOLIC BLOOD PRESSURE: 156 MMHG

## 2024-09-18 DIAGNOSIS — K08.89 DENTALGIA: Primary | ICD-10-CM

## 2024-09-18 PROCEDURE — 99283 EMERGENCY DEPT VISIT LOW MDM: CPT

## 2024-09-18 PROCEDURE — 6370000000 HC RX 637 (ALT 250 FOR IP): Performed by: PERSONAL EMERGENCY RESPONSE ATTENDANT

## 2024-09-18 RX ORDER — ACETAMINOPHEN 500 MG
500 TABLET ORAL ONCE
Status: COMPLETED | OUTPATIENT
Start: 2024-09-18 | End: 2024-09-18

## 2024-09-18 RX ORDER — CLINDAMYCIN HCL 300 MG
300 CAPSULE ORAL 2 TIMES DAILY
Qty: 14 CAPSULE | Refills: 0 | Status: SHIPPED | OUTPATIENT
Start: 2024-09-18 | End: 2024-09-21

## 2024-09-18 RX ORDER — CLINDAMYCIN HCL 150 MG
300 CAPSULE ORAL ONCE
Status: COMPLETED | OUTPATIENT
Start: 2024-09-18 | End: 2024-09-18

## 2024-09-18 RX ADMIN — ACETAMINOPHEN 500 MG: 500 TABLET ORAL at 21:02

## 2024-09-18 RX ADMIN — CLINDAMYCIN HYDROCHLORIDE 300 MG: 150 CAPSULE ORAL at 21:03

## 2024-09-18 ASSESSMENT — ENCOUNTER SYMPTOMS
NAUSEA: 0
COLOR CHANGE: 0
SHORTNESS OF BREATH: 0
COUGH: 0
DIARRHEA: 0
BLOOD IN STOOL: 0
ABDOMINAL PAIN: 0
RHINORRHEA: 0
SORE THROAT: 0
VOMITING: 0

## 2024-09-18 ASSESSMENT — PAIN SCALES - GENERAL: PAINLEVEL_OUTOF10: 7

## 2024-09-21 ENCOUNTER — APPOINTMENT (OUTPATIENT)
Dept: CT IMAGING | Age: 64
End: 2024-09-21
Payer: MEDICARE

## 2024-09-21 ENCOUNTER — HOSPITAL ENCOUNTER (EMERGENCY)
Age: 64
Discharge: HOME | End: 2024-09-21
Attending: EMERGENCY MEDICINE
Payer: MEDICARE

## 2024-09-21 VITALS
OXYGEN SATURATION: 95 % | TEMPERATURE: 99.3 F | DIASTOLIC BLOOD PRESSURE: 66 MMHG | HEART RATE: 70 BPM | SYSTOLIC BLOOD PRESSURE: 132 MMHG | WEIGHT: 132 LBS | BODY MASS INDEX: 24.92 KG/M2 | RESPIRATION RATE: 17 BRPM | HEIGHT: 61 IN

## 2024-09-21 DIAGNOSIS — K04.7 DENTAL ABSCESS: ICD-10-CM

## 2024-09-21 DIAGNOSIS — K08.89 DENTALGIA: ICD-10-CM

## 2024-09-21 DIAGNOSIS — K02.9 DENTAL CARIES: Primary | ICD-10-CM

## 2024-09-21 DIAGNOSIS — K02.9 DENTAL DECAY: ICD-10-CM

## 2024-09-21 LAB
ALBUMIN SERPL-MCNC: 4 G/DL (ref 3.5–4.6)
ALP SERPL-CCNC: 75 U/L (ref 40–130)
ALT SERPL-CCNC: 10 U/L (ref 0–33)
ANION GAP SERPL CALCULATED.3IONS-SCNC: 12 MEQ/L (ref 9–15)
AST SERPL-CCNC: 15 U/L (ref 0–35)
BASOPHILS # BLD: 0 K/UL (ref 0–0.2)
BASOPHILS NFR BLD: 0.1 %
BILIRUB SERPL-MCNC: 0.8 MG/DL (ref 0.2–0.7)
BUN SERPL-MCNC: 15 MG/DL (ref 8–23)
CALCIUM SERPL-MCNC: 9 MG/DL (ref 8.5–9.9)
CHLORIDE SERPL-SCNC: 99 MEQ/L (ref 95–107)
CO2 SERPL-SCNC: 28 MEQ/L (ref 20–31)
CREAT SERPL-MCNC: 0.87 MG/DL (ref 0.5–0.9)
EOSINOPHIL # BLD: 0.1 K/UL (ref 0–0.7)
EOSINOPHIL NFR BLD: 0.5 %
ERYTHROCYTE [DISTWIDTH] IN BLOOD BY AUTOMATED COUNT: 12.9 % (ref 11.5–14.5)
GLOBULIN SER CALC-MCNC: 3.3 G/DL (ref 2.3–3.5)
GLUCOSE SERPL-MCNC: 98 MG/DL (ref 70–99)
HCT VFR BLD AUTO: 45.3 % (ref 37–47)
HGB BLD-MCNC: 15.2 G/DL (ref 12–16)
LYMPHOCYTES # BLD: 0.9 K/UL (ref 1–4.8)
LYMPHOCYTES NFR BLD: 9.1 %
MCH RBC QN AUTO: 32 PG (ref 27–31.3)
MCHC RBC AUTO-ENTMCNC: 33.6 % (ref 33–37)
MCV RBC AUTO: 95.4 FL (ref 79.4–94.8)
MONOCYTES # BLD: 0.8 K/UL (ref 0.2–0.8)
MONOCYTES NFR BLD: 8.6 %
NEUTROPHILS # BLD: 7.9 K/UL (ref 1.4–6.5)
NEUTS SEG NFR BLD: 81.3 %
PLATELET # BLD AUTO: 239 K/UL (ref 130–400)
POTASSIUM SERPL-SCNC: 4.5 MEQ/L (ref 3.4–4.9)
PROT SERPL-MCNC: 7.3 G/DL (ref 6.3–8)
RBC # BLD AUTO: 4.75 M/UL (ref 4.2–5.4)
SODIUM SERPL-SCNC: 139 MEQ/L (ref 135–144)
WBC # BLD AUTO: 9.7 K/UL (ref 4.8–10.8)

## 2024-09-21 PROCEDURE — 80053 COMPREHEN METABOLIC PANEL: CPT

## 2024-09-21 PROCEDURE — 6360000002 HC RX W HCPCS: Performed by: EMERGENCY MEDICINE

## 2024-09-21 PROCEDURE — 70490 CT SOFT TISSUE NECK W/O DYE: CPT

## 2024-09-21 PROCEDURE — 85025 COMPLETE CBC W/AUTO DIFF WBC: CPT

## 2024-09-21 PROCEDURE — 99284 EMERGENCY DEPT VISIT MOD MDM: CPT

## 2024-09-21 PROCEDURE — 96365 THER/PROPH/DIAG IV INF INIT: CPT

## 2024-09-21 RX ORDER — CLINDAMYCIN PHOSPHATE 900 MG/50ML
900 INJECTION, SOLUTION INTRAVENOUS ONCE
Status: COMPLETED | OUTPATIENT
Start: 2024-09-21 | End: 2024-09-21

## 2024-09-21 RX ORDER — OXYCODONE AND ACETAMINOPHEN 5; 325 MG/1; MG/1
1 TABLET ORAL EVERY 4 HOURS PRN
Qty: 14 TABLET | Refills: 0 | Status: SHIPPED | OUTPATIENT
Start: 2024-09-21 | End: 2024-09-24

## 2024-09-21 RX ORDER — CLINDAMYCIN HCL 150 MG
450 CAPSULE ORAL 3 TIMES DAILY
Qty: 72 CAPSULE | Refills: 0 | Status: SHIPPED | OUTPATIENT
Start: 2024-09-21 | End: 2024-09-29

## 2024-09-21 RX ADMIN — CLINDAMYCIN PHOSPHATE 900 MG: 900 INJECTION, SOLUTION INTRAVENOUS at 12:46

## 2024-09-21 ASSESSMENT — ENCOUNTER SYMPTOMS
VOMITING: 0
NAUSEA: 0
COLOR CHANGE: 0
DIARRHEA: 0
EYE PAIN: 0
ALLERGIC/IMMUNOLOGIC NEGATIVE: 1
TROUBLE SWALLOWING: 1
SHORTNESS OF BREATH: 0
APNEA: 0
ABDOMINAL PAIN: 0

## 2024-09-21 ASSESSMENT — PAIN SCALES - GENERAL: PAINLEVEL_OUTOF10: 8

## 2024-09-21 ASSESSMENT — PAIN DESCRIPTION - ONSET: ONSET: ON-GOING

## 2024-09-21 ASSESSMENT — PAIN DESCRIPTION - PAIN TYPE: TYPE: ACUTE PAIN

## 2024-09-21 ASSESSMENT — PAIN - FUNCTIONAL ASSESSMENT
PAIN_FUNCTIONAL_ASSESSMENT: PREVENTS OR INTERFERES SOME ACTIVE ACTIVITIES AND ADLS
PAIN_FUNCTIONAL_ASSESSMENT: 0-10

## 2024-09-21 ASSESSMENT — PAIN DESCRIPTION - DESCRIPTORS: DESCRIPTORS: THROBBING;ACHING

## 2024-09-21 ASSESSMENT — LIFESTYLE VARIABLES
HOW OFTEN DO YOU HAVE A DRINK CONTAINING ALCOHOL: NEVER
HOW MANY STANDARD DRINKS CONTAINING ALCOHOL DO YOU HAVE ON A TYPICAL DAY: PATIENT DOES NOT DRINK

## 2024-09-21 ASSESSMENT — PAIN DESCRIPTION - ORIENTATION: ORIENTATION: LEFT

## 2024-09-21 ASSESSMENT — PAIN DESCRIPTION - LOCATION: LOCATION: NECK;TEETH

## 2024-09-21 ASSESSMENT — PAIN DESCRIPTION - FREQUENCY: FREQUENCY: CONTINUOUS

## 2024-09-25 ENCOUNTER — TELEPHONE (OUTPATIENT)
Dept: INFECTIOUS DISEASES | Age: 64
End: 2024-09-25

## 2024-10-18 ENCOUNTER — NURSE ONLY (OUTPATIENT)
Dept: INFECTIOUS DISEASES | Age: 64
End: 2024-10-18

## 2024-10-18 ENCOUNTER — TELEPHONE (OUTPATIENT)
Dept: INFECTIOUS DISEASES | Age: 64
End: 2024-10-18

## 2024-10-18 VITALS
DIASTOLIC BLOOD PRESSURE: 78 MMHG | WEIGHT: 117.6 LBS | BODY MASS INDEX: 22.22 KG/M2 | TEMPERATURE: 98.1 F | HEART RATE: 53 BPM | SYSTOLIC BLOOD PRESSURE: 154 MMHG

## 2024-10-18 DIAGNOSIS — R63.4 UNINTENTIONAL WEIGHT LOSS: ICD-10-CM

## 2024-10-18 DIAGNOSIS — B20 HUMAN IMMUNODEFICIENCY VIRUS (HCC): ICD-10-CM

## 2024-10-18 DIAGNOSIS — Z21 HIV INFECTION, UNSPECIFIED SYMPTOM STATUS (HCC): ICD-10-CM

## 2024-10-18 DIAGNOSIS — Z23 NEED FOR INFLUENZA VACCINATION: Primary | ICD-10-CM

## 2024-10-18 DIAGNOSIS — R53.83 OTHER FATIGUE: ICD-10-CM

## 2024-10-18 LAB
ALBUMIN SERPL-MCNC: 4.4 G/DL (ref 3.5–4.6)
ALP SERPL-CCNC: 63 U/L (ref 40–130)
ALT SERPL-CCNC: 10 U/L (ref 0–33)
ANION GAP SERPL CALCULATED.3IONS-SCNC: 8 MEQ/L (ref 9–15)
AST SERPL-CCNC: 14 U/L (ref 0–35)
BILIRUB SERPL-MCNC: 0.4 MG/DL (ref 0.2–0.7)
BUN SERPL-MCNC: 16 MG/DL (ref 8–23)
CALCIUM SERPL-MCNC: 9.1 MG/DL (ref 8.5–9.9)
CHLORIDE SERPL-SCNC: 102 MEQ/L (ref 95–107)
CO2 SERPL-SCNC: 31 MEQ/L (ref 20–31)
CREAT SERPL-MCNC: 0.75 MG/DL (ref 0.5–0.9)
ERYTHROCYTE [DISTWIDTH] IN BLOOD BY AUTOMATED COUNT: 14.2 % (ref 11.5–14.5)
GLOBULIN SER CALC-MCNC: 2.8 G/DL (ref 2.3–3.5)
GLUCOSE SERPL-MCNC: 92 MG/DL (ref 70–99)
HCT VFR BLD AUTO: 43.3 % (ref 37–47)
HGB BLD-MCNC: 14 G/DL (ref 12–16)
MCH RBC QN AUTO: 31 PG (ref 27–31.3)
MCHC RBC AUTO-ENTMCNC: 32.3 % (ref 33–37)
MCV RBC AUTO: 95.8 FL (ref 79.4–94.8)
PLATELET # BLD AUTO: 199 K/UL (ref 130–400)
POTASSIUM SERPL-SCNC: 4.1 MEQ/L (ref 3.4–4.9)
PROT SERPL-MCNC: 7.2 G/DL (ref 6.3–8)
RBC # BLD AUTO: 4.52 M/UL (ref 4.2–5.4)
SODIUM SERPL-SCNC: 141 MEQ/L (ref 135–144)
WBC # BLD AUTO: 5.5 K/UL (ref 4.8–10.8)

## 2024-10-18 ASSESSMENT — PATIENT HEALTH QUESTIONNAIRE - PHQ9
SUM OF ALL RESPONSES TO PHQ QUESTIONS 1-9: 2
1. LITTLE INTEREST OR PLEASURE IN DOING THINGS: SEVERAL DAYS
2. FEELING DOWN, DEPRESSED OR HOPELESS: SEVERAL DAYS
SUM OF ALL RESPONSES TO PHQ9 QUESTIONS 1 & 2: 2
SUM OF ALL RESPONSES TO PHQ QUESTIONS 1-9: 2

## 2024-10-18 NOTE — TELEPHONE ENCOUNTER
Pt called in with updates.   Will be going for labs today. Upcoming apt next week       Providied active listening as she discussed new relationship.  Doing well.  7 months now  Living in [place]  Aware of dx - SA aware of U=U  Will be going to visit him in Jan or Feb  Thinking about moving.   Discussed transfer of care and what to expect.     Flu vac today. Apt scheduled. She will  lab orders

## 2024-10-18 NOTE — PROGRESS NOTES
Pt presented pt office for scheduled apt.      Here today for Flu vaccine. Doing ok. No issues. No issues with medications.   Providied active listening as she discussed new relationship.   Has new PABLITO CM      Significant weight loss over time. But noted increase today.   3 weeks ago 132 with [facility]  117.6 lbs today      Vaccine Information Sheet, \"Influenza - Inactivated\"  reviewed with Alesha Miller, or parent/legal guardian of  Alesha Miller and verbalized understanding.    Flu Vaccine:  Patient responses:    Have you ever had a reaction to a flu vaccine? No  Are you able to eat eggs without adverse effects?  Yes  Do you have any current illness?  No  Have you ever had Guillian [place] Syndrome?  No    Flu vaccine given per order. Please see immunization tab.        After obtaining consent, and per orders of Dr. Schulz, injection of  0.5  m.l   FLU VACCINE  Was administered to left  deltoid.  Cameron:  Saqirus  Lot #: [id number]  EXP: June 17th, 2025   NDC: 69328-258-38     Patient tolerated well. Denies pain at site. Patient instructed to remain in clinic for 5-10 minutes afterwards, and to report any adverse reaction to me immediately.    Will call office if needed.         Annual RN     ART currently: Genvoya  Compliant with medications --- misses 1-2 times weekly -- forgets   All supplentments dicussed and educatioon providied pt keeps all sepatre from ART    Barriers to HIV VL suppression and compliance- forgetful   Viral Load -- 5.7.24 undetectable   New labs today        Current partner -- yes x 7 month  Pt is aware of U=U and verbalized understanding.   SA and aware pf dx     Support system:   Monthly support group -- aware  She does attend sometimes     No smoking  No drinking  No drugs    Transportation: stable  PCP? Dr Salinas  Working? SSI  Housing? stable     Dental-- Encouraged --   When: ER 9.18.24 and 9.21.21 9.25.24 tooth pulled - [facility]   Cleaning next month  Where:     [facility]

## 2024-10-20 LAB
HIV-1 RNA BY PCR, QN: NOT DETECTED
SOURCE: NORMAL

## 2024-10-21 LAB
CD3+CD4+ CELLS # BLD: 960 CELLS/UL (ref 430–1800)
CD4 % HELPER T CELL: 65 % (ref 32–64)
IMMUNODEFICIENCY MARKERS SPEC-IMP: ABNORMAL

## 2024-10-22 ENCOUNTER — OFFICE VISIT (OUTPATIENT)
Dept: INFECTIOUS DISEASES | Age: 64
End: 2024-10-22
Payer: MEDICARE

## 2024-10-22 VITALS
BODY MASS INDEX: 22.56 KG/M2 | TEMPERATURE: 97.5 F | SYSTOLIC BLOOD PRESSURE: 116 MMHG | WEIGHT: 119.4 LBS | HEART RATE: 62 BPM | DIASTOLIC BLOOD PRESSURE: 69 MMHG

## 2024-10-22 DIAGNOSIS — E78.2 HYPERLIPIDEMIA, MIXED: ICD-10-CM

## 2024-10-22 DIAGNOSIS — Z21 HIV INFECTION, UNSPECIFIED SYMPTOM STATUS (HCC): Primary | ICD-10-CM

## 2024-10-22 DIAGNOSIS — Z12.4 CERVICAL CANCER SCREENING: ICD-10-CM

## 2024-10-22 DIAGNOSIS — Z12.31 ENCOUNTER FOR SCREENING MAMMOGRAM FOR MALIGNANT NEOPLASM OF BREAST: ICD-10-CM

## 2024-10-22 LAB
C TRACH DNA UR QL NAA+PROBE: NEGATIVE
N GONORRHOEA DNA UR QL NAA+PROBE: NEGATIVE

## 2024-10-22 PROCEDURE — G8427 DOCREV CUR MEDS BY ELIG CLIN: HCPCS | Performed by: INTERNAL MEDICINE

## 2024-10-22 PROCEDURE — G8484 FLU IMMUNIZE NO ADMIN: HCPCS | Performed by: INTERNAL MEDICINE

## 2024-10-22 PROCEDURE — 99214 OFFICE O/P EST MOD 30 MIN: CPT | Performed by: INTERNAL MEDICINE

## 2024-10-22 PROCEDURE — 3078F DIAST BP <80 MM HG: CPT | Performed by: INTERNAL MEDICINE

## 2024-10-22 PROCEDURE — 3074F SYST BP LT 130 MM HG: CPT | Performed by: INTERNAL MEDICINE

## 2024-10-22 PROCEDURE — 1036F TOBACCO NON-USER: CPT | Performed by: INTERNAL MEDICINE

## 2024-10-22 PROCEDURE — 3017F COLORECTAL CA SCREEN DOC REV: CPT | Performed by: INTERNAL MEDICINE

## 2024-10-22 PROCEDURE — G8420 CALC BMI NORM PARAMETERS: HCPCS | Performed by: INTERNAL MEDICINE

## 2024-10-22 RX ORDER — OMEGA-3-ACID ETHYL ESTERS 1 G/1
2 CAPSULE, LIQUID FILLED ORAL DAILY
Qty: 60 CAPSULE | Refills: 5 | Status: SHIPPED | OUTPATIENT
Start: 2024-10-22 | End: 2024-11-21

## 2024-10-22 RX ORDER — CHLORAL HYDRATE 500 MG
1000 CAPSULE ORAL DAILY
COMMUNITY

## 2024-10-22 ASSESSMENT — PATIENT HEALTH QUESTIONNAIRE - PHQ9
SUM OF ALL RESPONSES TO PHQ QUESTIONS 1-9: 0
SUM OF ALL RESPONSES TO PHQ QUESTIONS 1-9: 0
1. LITTLE INTEREST OR PLEASURE IN DOING THINGS: NOT AT ALL
SUM OF ALL RESPONSES TO PHQ QUESTIONS 1-9: 0
SUM OF ALL RESPONSES TO PHQ QUESTIONS 1-9: 0
SUM OF ALL RESPONSES TO PHQ9 QUESTIONS 1 & 2: 0
2. FEELING DOWN, DEPRESSED OR HOPELESS: NOT AT ALL

## 2024-10-22 ASSESSMENT — ENCOUNTER SYMPTOMS: ABDOMINAL PAIN: 1

## 2024-10-22 NOTE — PROGRESS NOTES
Now taking fish oil -- made her itch in past. Allergy noted to be itching.   Pt preferred to stop zetia due to vision issues. Pt started taking fish oil weekly her self.   Discussed allergy -- tolerating well once weekly now issues.     Willing to take lovaza - Dr Schulz discussed.     Med reviewed and updated,  Started BP med per PCP     No drinking  No drug  No etoh    Misses Genvoya occasionally.     Weight loss over few months.     Current partner -- yes x 7 month  Pt is aware of U=U and verbalized understanding.   SA and aware pf dx     Dental follow up November 2024---[facility] --   When: every 3-4 months   Where: [facility]  Colonoscopy-----  2024 -- repeat 5 yrs  Depression monitoring--- + follows regularly.   Mammogram---  due --- aware. States she goes every 2 years      Flu vaccine received   Pneumonia vacc? -- completed-- 2018  Needs updated- yes  encouraged vaccine and education provided.  Hep B vaccine/screening -- was done in past but does not have record per pt  Vaccinated: yes  Screened:   Immune?:   Recheck Hep B antibiody - yes  
   Cancer Maternal Aunt         skin    Breast Cancer Maternal Aunt     Colon Cancer Neg Hx      Allergies   Allergen Reactions    Amoxicillin Hives    Clobetasol Other (See Comments)     Dry mouth.     Doxycycline Monohydrate Diarrhea    Fish Oil Itching    Fluconazole Other (See Comments)     Pt. Felt very different on this medication, dizzy,     Paroxetine Other (See Comments)     SSRI's with various complaints - headaches, \"Funny feelings\"    Pcn [Penicillins] Hives    Statins Other (See Comments)     Muscles aches  Muscles aches     Current Outpatient Medications on File Prior to Visit   Medication Sig Dispense Refill    Omega-3 Fatty Acids (FISH OIL) 1000 MG capsule Take 1 capsule by mouth daily Per pt takes weekly      cetirizine (ZYRTEC) 10 MG tablet Take 1 tablet by mouth daily 30 tablet 0    elvitegravir-cobicistat-emtricitabine-tenofovir alafenamide (GENVOYA) 998-078-081-10 MG TABLET Take 1 tablet by mouth daily 30 tablet 3    valsartan (DIOVAN) 40 MG tablet Take 1 tablet by mouth daily Half a tablet 3 times weekly. Per PCP      ergocalciferol (ERGOCALCIFEROL) 1.25 MG (27277 UT) capsule Take 1 capsule by mouth once a week      cyanocobalamin 1000 MCG tablet Take 1 tablet by mouth      clonazepam (KLONOPIN) 0.5 MG tablet Take 0.5 tablets by mouth 2 times daily as needed.      Multiple Vitamin (MULTIVITAMIN PO) Take 1 tablet by mouth daily.      benzonatate (TESSALON) 200 MG capsule Take 1 capsule by mouth 3 times daily as needed for Cough (Patient not taking: Reported on 5/16/2024) 30 capsule 0    ibuprofen (ADVIL;MOTRIN) 600 MG tablet Take 1 tablet by mouth       No current facility-administered medications on file prior to visit.       Review of Systems   Constitutional:  Positive for unexpected weight change (.  Continuous weight loss over the past 1 year). Negative for fatigue.   Gastrointestinal:  Positive for abdominal pain (.  Occasional).   Psychiatric/Behavioral:  Positive for dysphoric mood

## 2024-11-05 ENCOUNTER — HOSPITAL ENCOUNTER (OUTPATIENT)
Dept: WOMENS IMAGING | Age: 64
Discharge: HOME | End: 2024-11-07
Attending: INTERNAL MEDICINE
Payer: COMMERCIAL

## 2024-11-05 DIAGNOSIS — Z12.31 ENCOUNTER FOR SCREENING MAMMOGRAM FOR MALIGNANT NEOPLASM OF BREAST: ICD-10-CM

## 2024-11-05 PROCEDURE — 77067 SCR MAMMO BI INCL CAD: CPT

## 2024-11-08 ENCOUNTER — TELEPHONE (OUTPATIENT)
Dept: INFECTIOUS DISEASES | Age: 64
End: 2024-11-08

## 2024-11-08 NOTE — TELEPHONE ENCOUNTER
Pt called in asking for CM apt. She is also do for RW renewals.   Wanting to review insurance options and/or discuss changes if needed.    Reviewed needed renewal documentation.  She will bring updated income, ID and any insurance information.       Didn't tolerate Lovza. Taking fish oil though OTC. She is working on getting it to daily. She starts medications slowly bc she also gets side effects.   She is taking it now 2-3 times weekly.

## 2024-11-12 ENCOUNTER — OFFICE VISIT (OUTPATIENT)
Dept: OBGYN CLINIC | Age: 64
End: 2024-11-12
Payer: MEDICARE

## 2024-11-12 VITALS
BODY MASS INDEX: 23.22 KG/M2 | HEIGHT: 61 IN | SYSTOLIC BLOOD PRESSURE: 132 MMHG | DIASTOLIC BLOOD PRESSURE: 72 MMHG | WEIGHT: 123 LBS | HEART RATE: 68 BPM

## 2024-11-12 DIAGNOSIS — Z01.419 WELL WOMAN EXAM WITH ROUTINE GYNECOLOGICAL EXAM: Primary | ICD-10-CM

## 2024-11-12 DIAGNOSIS — Z01.419 WELL WOMAN EXAM WITH ROUTINE GYNECOLOGICAL EXAM: ICD-10-CM

## 2024-11-12 DIAGNOSIS — N95.2 ATROPHIC VAGINITIS: ICD-10-CM

## 2024-11-12 DIAGNOSIS — N94.10 DYSPAREUNIA, FEMALE: ICD-10-CM

## 2024-11-12 PROCEDURE — 99396 PREV VISIT EST AGE 40-64: CPT | Performed by: OBSTETRICS & GYNECOLOGY

## 2024-11-12 PROCEDURE — G8484 FLU IMMUNIZE NO ADMIN: HCPCS | Performed by: OBSTETRICS & GYNECOLOGY

## 2024-11-12 PROCEDURE — 3078F DIAST BP <80 MM HG: CPT | Performed by: OBSTETRICS & GYNECOLOGY

## 2024-11-12 PROCEDURE — 3075F SYST BP GE 130 - 139MM HG: CPT | Performed by: OBSTETRICS & GYNECOLOGY

## 2024-11-12 RX ORDER — GLYCERIN/PROPYLENE GLYCOL/WATR
1 SOLUTION, NON-ORAL VAGINAL PRN
Qty: 1 EACH | Refills: 2 | Status: SHIPPED | OUTPATIENT
Start: 2024-11-12

## 2024-11-12 NOTE — PROGRESS NOTES
and are negative.    All other systems reviewed and are negative.  Review of Systems   Constitutional: Negative for chills and fever.   Respiratory: Negative for cough and shortness of breath.    Cardiovascular: Negative for chest painand palpitations.   Gastrointestinal: Negative for nausea and vomiting.   Genitourinary: Negative for dysuria, frequency and urgency.   Musculoskeletal: Negative formyalgias.   Neurological: Negative for dizziness, seizures and headaches.   Psychiatric/Behavioral: Negative for depression and suicidal ideas.     :     Vitals:  /72 (Site: Right Upper Arm, Position: Sitting, Cuff Size: Medium Adult)   Pulse 68   Ht 1.549 m (5' 1\")   Wt 55.8 kg (123 lb)   LMP 05/05/2012   BMI 23.24 kg/m²     Physical Exam  Physical Exam    Constitutional: She is oriented to person, place, and time and well-developed, well-nourished, and in nodistress.   HENT:   Head: Normocephalic and atraumatic.   Eyes: Conjunctivae are normal. Pupils are equal, round, and reactive to light.   Neck: Normal range ofmotion. Neck supple.   Cardiovascular: Normal rate and regular rhythm.    Pulmonary/Chest: Effort normal and breath sounds normal. No respiratory distress. Right breast exhibits noinverted nipple, no mass, no nipple discharge, no skin change and no tenderness. Left breast exhibits no inverted nipple, no mass, no nipple discharge, no skin change and no tenderness. Breasts are symmetrical.   Abdominal: Soft. Bowel sounds are normal.   Genitourinary: Vagina normal, uterus normal and cervix normal. No vaginal discharge found.   Musculoskeletal: Normal range ofmotion.   Neurological: She is alert and oriented to person, place, and time. She has normal reflexes.   Psychiatric: Memory, affect andjudgment normal.       Assessment:      Diagnosis Orders   1. Well woman exam with routine gynecological exam  Pap Smear      2. Atrophic vaginitis        3. Dyspareunia, female            Body mass index is 23.24

## 2024-11-16 LAB
HPV HR 12 DNA SPEC QL NAA+PROBE: DETECTED
HPV16 DNA SPEC QL NAA+PROBE: NOT DETECTED
HPV16+18+H RISK 12 DNA SPEC-IMP: ABNORMAL
HPV18 DNA SPEC QL NAA+PROBE: NOT DETECTED

## 2024-11-18 ENCOUNTER — TELEPHONE (OUTPATIENT)
Dept: INFECTIOUS DISEASES | Age: 64
End: 2024-11-18

## 2024-11-18 ENCOUNTER — LAB (OUTPATIENT)
Dept: INFECTIOUS DISEASES | Age: 64
End: 2024-11-18

## 2024-11-18 NOTE — PROGRESS NOTES
Pt presented for scheduled appointment with CM.  Pt was late for appointment.  CM completed Semi-Annual Review, no changes noted to RW Part A Eligibility for services.  CM updated ISP, pt agreed with POC.  Pt remains compliant with HIV Care, VL is undetectable.  Pt does follow up with dental care [facility]  During visit, pt discussed desire to change insurance coverage, currently has [employer] Dual Complete.  Pt stated many of her benefits, such as vision and OTC benefits will be lowered in the next year.  CM assisted pt in applying for [employer] Medicare/Medicaid coverage.  Coverage will begin January 1, 2025.  CM also provided socialization.  Pt discussed new relationship of 7 months.  Pt is very happy about relationship.  CM provided active listening and support.   CM will follow up with pt as needed.

## 2024-11-18 NOTE — TELEPHONE ENCOUNTER
CM called pt regarding scheduled appointment.  Pt had an in office visit, Cm called pt to complete appointment via phone.  CM called pt 2xs, left messages on both occasions.  CM requested pt to call to reschedule appointment.

## 2024-11-21 ENCOUNTER — TELEPHONE (OUTPATIENT)
Dept: INFECTIOUS DISEASES | Age: 64
End: 2024-11-21

## 2024-11-21 NOTE — TELEPHONE ENCOUNTER
Call placed to pt regarding need to update RW eligibility and paperwork.       All [place] paperwork reviewed and discussed. Pt aware that RW funds are only to be used as last resort. JUANITA and consents reviewed and signed. Transport, Grievance and sliding fee scale reviewed. Pt denied any questions regarding program.     See note from Dr Schulz follow up 10.22.24        Denies other issues or concerns and will call office if needed                This was discussed via phone per [place] COVID guidelines.

## 2024-12-04 RX ORDER — ELVITEGRAVIR, COBICISTAT, EMTRICITABINE, AND TENOFOVIR ALAFENAMIDE 150; 150; 200; 10 MG/1; MG/1; MG/1; MG/1
1 TABLET ORAL DAILY
Qty: 30 TABLET | Refills: 5 | Status: ACTIVE | OUTPATIENT
Start: 2024-12-04

## 2024-12-09 ENCOUNTER — TELEPHONE (OUTPATIENT)
Dept: OBGYN CLINIC | Age: 64
End: 2024-12-09

## 2024-12-09 NOTE — TELEPHONE ENCOUNTER
Pt. Returning phone call for abnormal pap. Pt. Is aware and having  call to schedule an appointment.

## 2024-12-30 ENCOUNTER — TELEPHONE (OUTPATIENT)
Dept: INFECTIOUS DISEASES | Age: 64
End: 2024-12-30

## 2024-12-30 NOTE — TELEPHONE ENCOUNTER
Pt called in wanting to talk.   Upcoming colop. Nervous. Concerns for discomfort.   Provided active ;listening  Partner will be moving here with her. She is happy about this.   Reviewed other scheduled apt.

## 2025-01-06 ENCOUNTER — TELEPHONE (OUTPATIENT)
Dept: INFECTIOUS DISEASES | Age: 65
End: 2025-01-06

## 2025-01-06 NOTE — TELEPHONE ENCOUNTER
Pt called in asking for OB information. Reviewed Dr Villeda office phone. She is asking to reschedule her procedure. She will call his office to discuss procedure/importance and re schedule if able.     Provided active listening as pt discussed multiple topics and stressors.

## 2025-01-14 ENCOUNTER — PROCEDURE VISIT (OUTPATIENT)
Dept: OBGYN CLINIC | Age: 65
End: 2025-01-14

## 2025-01-14 VITALS
HEART RATE: 68 BPM | SYSTOLIC BLOOD PRESSURE: 108 MMHG | HEIGHT: 61 IN | WEIGHT: 124 LBS | DIASTOLIC BLOOD PRESSURE: 72 MMHG | BODY MASS INDEX: 23.41 KG/M2

## 2025-01-14 DIAGNOSIS — R87.610 ATYPICAL SQUAMOUS CELLS OF UNDETERMINED SIGNIFICANCE (ASCUS) ON PAPANICOLAOU SMEAR OF CERVIX: ICD-10-CM

## 2025-01-14 DIAGNOSIS — R87.610 ATYPICAL SQUAMOUS CELLS OF UNDETERMINED SIGNIFICANCE (ASCUS) ON PAPANICOLAOU SMEAR OF CERVIX: Primary | ICD-10-CM

## 2025-01-14 SDOH — ECONOMIC STABILITY: FOOD INSECURITY: WITHIN THE PAST 12 MONTHS, YOU WORRIED THAT YOUR FOOD WOULD RUN OUT BEFORE YOU GOT MONEY TO BUY MORE.: SOMETIMES TRUE

## 2025-01-14 SDOH — ECONOMIC STABILITY: FOOD INSECURITY: WITHIN THE PAST 12 MONTHS, THE FOOD YOU BOUGHT JUST DIDN'T LAST AND YOU DIDN'T HAVE MONEY TO GET MORE.: NEVER TRUE

## 2025-01-14 ASSESSMENT — PATIENT HEALTH QUESTIONNAIRE - PHQ9
10. IF YOU CHECKED OFF ANY PROBLEMS, HOW DIFFICULT HAVE THESE PROBLEMS MADE IT FOR YOU TO DO YOUR WORK, TAKE CARE OF THINGS AT HOME, OR GET ALONG WITH OTHER PEOPLE: NOT DIFFICULT AT ALL
SUM OF ALL RESPONSES TO PHQ QUESTIONS 1-9: 6
6. FEELING BAD ABOUT YOURSELF - OR THAT YOU ARE A FAILURE OR HAVE LET YOURSELF OR YOUR FAMILY DOWN: SEVERAL DAYS
4. FEELING TIRED OR HAVING LITTLE ENERGY: SEVERAL DAYS
2. FEELING DOWN, DEPRESSED OR HOPELESS: SEVERAL DAYS
3. TROUBLE FALLING OR STAYING ASLEEP: NOT AT ALL
8. MOVING OR SPEAKING SO SLOWLY THAT OTHER PEOPLE COULD HAVE NOTICED. OR THE OPPOSITE, BEING SO FIGETY OR RESTLESS THAT YOU HAVE BEEN MOVING AROUND A LOT MORE THAN USUAL: NOT AT ALL
1. LITTLE INTEREST OR PLEASURE IN DOING THINGS: SEVERAL DAYS
9. THOUGHTS THAT YOU WOULD BE BETTER OFF DEAD, OR OF HURTING YOURSELF: NOT AT ALL
5. POOR APPETITE OR OVEREATING: SEVERAL DAYS
SUM OF ALL RESPONSES TO PHQ QUESTIONS 1-9: 6
7. TROUBLE CONCENTRATING ON THINGS, SUCH AS READING THE NEWSPAPER OR WATCHING TELEVISION: SEVERAL DAYS
SUM OF ALL RESPONSES TO PHQ9 QUESTIONS 1 & 2: 2

## 2025-01-15 NOTE — PROGRESS NOTES
Colposcopy Procedure Note    Indications: Pap smear :  ASCUS with positive other HPV, history of HIV .     Procedure Details   The risks and benefits of the procedure and Written informed consent obtained.    Speculum placed in vagina and excellent visualization of cervix achieved by colposcope, cervix swabbed x 3 with acetic acid solution.    Findings:  Cervix: no visible lesions and acetowhite lesion(s) noted at at 7:00 4:00 12:00 o'clock; biopsies from designated locations was taken including endocervical curettings..  .    Specimens: As above    Complications: none.    Plan:  Specimens labelled and sent to Pathology.    Follow up:  Return in about 2 weeks (around 1/28/2025) for F/U for results.      Miles Villeda MD

## 2025-01-20 ENCOUNTER — TELEPHONE (OUTPATIENT)
Dept: INFECTIOUS DISEASES | Age: 65
End: 2025-01-20

## 2025-01-20 NOTE — TELEPHONE ENCOUNTER
Pt called in wanting to talk.  1x1 scheduled as well  Had pap abnormal and procedure. Provided active listening as pt discussed fears/concern.   Will call as needed

## 2025-02-04 ENCOUNTER — OFFICE VISIT (OUTPATIENT)
Dept: OBGYN CLINIC | Age: 65
End: 2025-02-04
Payer: MEDICARE

## 2025-02-04 VITALS
SYSTOLIC BLOOD PRESSURE: 118 MMHG | DIASTOLIC BLOOD PRESSURE: 72 MMHG | WEIGHT: 121 LBS | HEIGHT: 61 IN | BODY MASS INDEX: 22.84 KG/M2 | HEART RATE: 68 BPM

## 2025-02-04 DIAGNOSIS — Z21 HIV POSITIVE (HCC): ICD-10-CM

## 2025-02-04 DIAGNOSIS — R87.612 LGSIL ON PAP SMEAR OF CERVIX: Primary | ICD-10-CM

## 2025-02-04 PROCEDURE — 3074F SYST BP LT 130 MM HG: CPT | Performed by: OBSTETRICS & GYNECOLOGY

## 2025-02-04 PROCEDURE — 3078F DIAST BP <80 MM HG: CPT | Performed by: OBSTETRICS & GYNECOLOGY

## 2025-02-04 PROCEDURE — 1036F TOBACCO NON-USER: CPT | Performed by: OBSTETRICS & GYNECOLOGY

## 2025-02-04 PROCEDURE — 99214 OFFICE O/P EST MOD 30 MIN: CPT | Performed by: OBSTETRICS & GYNECOLOGY

## 2025-02-04 PROCEDURE — G8420 CALC BMI NORM PARAMETERS: HCPCS | Performed by: OBSTETRICS & GYNECOLOGY

## 2025-02-04 PROCEDURE — G8427 DOCREV CUR MEDS BY ELIG CLIN: HCPCS | Performed by: OBSTETRICS & GYNECOLOGY

## 2025-02-04 PROCEDURE — 3017F COLORECTAL CA SCREEN DOC REV: CPT | Performed by: OBSTETRICS & GYNECOLOGY

## 2025-02-07 ENCOUNTER — TELEPHONE (OUTPATIENT)
Dept: INFECTIOUS DISEASES | Age: 65
End: 2025-02-07

## 2025-02-07 NOTE — TELEPHONE ENCOUNTER
Pt called in wanting to talk about pap test and fears.   Provided active listening.   Multiple other topics discussed  Reassurance

## 2025-02-10 ENCOUNTER — NURSE ONLY (OUTPATIENT)
Dept: INFECTIOUS DISEASES | Age: 65
End: 2025-02-10

## 2025-02-10 DIAGNOSIS — Z21 HIV INFECTION, UNSPECIFIED SYMPTOM STATUS (HCC): Primary | ICD-10-CM

## 2025-02-10 NOTE — PROGRESS NOTES
Pt presented to office for 1x1  Pt discussed multiple topics.   Provided active listening and support.   Relationship, healthcare, family - provided reassurance.   Pt will call after her next OB apt to discuss. Fears related to sx.

## 2025-02-11 NOTE — PROGRESS NOTES
individuals in attendance at the appointment consented to the use of AI, including the recording.      Miles Villeda MD

## 2025-03-05 ENCOUNTER — TELEPHONE (OUTPATIENT)
Dept: INFECTIOUS DISEASES | Age: 65
End: 2025-03-05

## 2025-03-05 DIAGNOSIS — E78.2 HYPERLIPIDEMIA, MIXED: ICD-10-CM

## 2025-03-05 DIAGNOSIS — Z21 HIV INFECTION, UNSPECIFIED SYMPTOM STATUS (HCC): Primary | ICD-10-CM

## 2025-03-05 NOTE — TELEPHONE ENCOUNTER
Pt called in asking about her apt and when to complete lab work.   Apt reviewed 4.14.25  Labs 1 week prior and fasting noted.     Will call if issues arise.

## 2025-03-10 ENCOUNTER — TELEPHONE (OUTPATIENT)
Dept: INFECTIOUS DISEASES | Age: 65
End: 2025-03-10

## 2025-03-10 NOTE — TELEPHONE ENCOUNTER
Pt called in asking about insurance.   Noting she got a call from [facility] noting she had 3 insurances.   Pt met with CM and preferred [employer] Medicare/Medicaid.   This was completed. Pt has active card.   Verified with CM that what is on file is dual insurance.   For further verification she will call [employer]. She will update office as needed

## 2025-04-08 DIAGNOSIS — E78.2 HYPERLIPIDEMIA, MIXED: ICD-10-CM

## 2025-04-08 DIAGNOSIS — Z21 HIV INFECTION, UNSPECIFIED SYMPTOM STATUS (HCC): ICD-10-CM

## 2025-04-08 LAB
ALBUMIN SERPL-MCNC: 4.5 G/DL (ref 3.5–4.6)
ALP SERPL-CCNC: 63 U/L (ref 40–130)
ALT SERPL-CCNC: 23 U/L (ref 0–33)
ANION GAP SERPL CALCULATED.3IONS-SCNC: 6 MEQ/L (ref 9–15)
AST SERPL-CCNC: 17 U/L (ref 0–35)
BILIRUB SERPL-MCNC: 0.4 MG/DL (ref 0.2–0.7)
BUN SERPL-MCNC: 18 MG/DL (ref 8–23)
CALCIUM SERPL-MCNC: 9.2 MG/DL (ref 8.5–9.9)
CHLORIDE SERPL-SCNC: 100 MEQ/L (ref 95–107)
CHOLEST SERPL-MCNC: 265 MG/DL (ref 0–199)
CO2 SERPL-SCNC: 32 MEQ/L (ref 20–31)
CREAT SERPL-MCNC: 0.9 MG/DL (ref 0.5–0.9)
ERYTHROCYTE [DISTWIDTH] IN BLOOD BY AUTOMATED COUNT: 13.2 % (ref 11.5–14.5)
GLOBULIN SER CALC-MCNC: 2.5 G/DL (ref 2.3–3.5)
GLUCOSE SERPL-MCNC: 96 MG/DL (ref 70–99)
HCT VFR BLD AUTO: 44 % (ref 37–47)
HDLC SERPL-MCNC: 59 MG/DL (ref 40–59)
HGB BLD-MCNC: 14.6 G/DL (ref 12–16)
LDLC SERPL CALC-MCNC: 180 MG/DL (ref 0–129)
MCH RBC QN AUTO: 32.2 PG (ref 27–31.3)
MCHC RBC AUTO-ENTMCNC: 33.2 % (ref 33–37)
MCV RBC AUTO: 97.1 FL (ref 79.4–94.8)
PLATELET # BLD AUTO: 201 K/UL (ref 130–400)
POTASSIUM SERPL-SCNC: 4.4 MEQ/L (ref 3.4–4.9)
PROT SERPL-MCNC: 7 G/DL (ref 6.3–8)
RBC # BLD AUTO: 4.53 M/UL (ref 4.2–5.4)
REAGIN+T PALLIDUM IGG+IGM SERPL-IMP: NORMAL
SODIUM SERPL-SCNC: 138 MEQ/L (ref 135–144)
TRIGL SERPL-MCNC: 128 MG/DL (ref 0–150)
WBC # BLD AUTO: 4.2 K/UL (ref 4.8–10.8)

## 2025-04-09 LAB
CD3+CD4+ CELLS # BLD: 716 CELLS/UL (ref 430–1800)
CD4 % HELPER T CELL: 60 % (ref 32–64)
IMMUNODEFICIENCY MARKERS SPEC-IMP: NORMAL

## 2025-04-11 PROBLEM — F33.1 RECURRENT MAJOR DEPRESSIVE EPISODES, MODERATE (HCC): Status: ACTIVE | Noted: 2024-08-15

## 2025-04-11 LAB
HIV-1 RNA BY PCR, QN: NOT DETECTED
SOURCE: NORMAL

## 2025-04-14 ENCOUNTER — OFFICE VISIT (OUTPATIENT)
Age: 65
End: 2025-04-14
Payer: MEDICARE

## 2025-04-14 VITALS
WEIGHT: 120.4 LBS | OXYGEN SATURATION: 98 % | RESPIRATION RATE: 16 BRPM | BODY MASS INDEX: 22.75 KG/M2 | DIASTOLIC BLOOD PRESSURE: 74 MMHG | TEMPERATURE: 98.3 F | HEART RATE: 65 BPM | SYSTOLIC BLOOD PRESSURE: 136 MMHG

## 2025-04-14 DIAGNOSIS — Z11.59 ENCOUNTER FOR SCREENING FOR OTHER VIRAL DISEASES: ICD-10-CM

## 2025-04-14 DIAGNOSIS — Z21 HIV INFECTION, UNSPECIFIED SYMPTOM STATUS (HCC): Primary | ICD-10-CM

## 2025-04-14 DIAGNOSIS — E78.2 HYPERLIPIDEMIA, MIXED: ICD-10-CM

## 2025-04-14 DIAGNOSIS — R19.5 POSITIVE COLORECTAL CANCER SCREENING USING COLOGUARD TEST: ICD-10-CM

## 2025-04-14 DIAGNOSIS — Z72.89 OTHER PROBLEMS RELATED TO LIFESTYLE: ICD-10-CM

## 2025-04-14 DIAGNOSIS — E55.9 VITAMIN D DEFICIENCY, UNSPECIFIED: ICD-10-CM

## 2025-04-14 PROCEDURE — 99214 OFFICE O/P EST MOD 30 MIN: CPT | Performed by: INTERNAL MEDICINE

## 2025-04-14 PROCEDURE — 3017F COLORECTAL CA SCREEN DOC REV: CPT | Performed by: INTERNAL MEDICINE

## 2025-04-14 PROCEDURE — 1036F TOBACCO NON-USER: CPT | Performed by: INTERNAL MEDICINE

## 2025-04-14 PROCEDURE — 3075F SYST BP GE 130 - 139MM HG: CPT | Performed by: INTERNAL MEDICINE

## 2025-04-14 PROCEDURE — G8420 CALC BMI NORM PARAMETERS: HCPCS | Performed by: INTERNAL MEDICINE

## 2025-04-14 PROCEDURE — 3078F DIAST BP <80 MM HG: CPT | Performed by: INTERNAL MEDICINE

## 2025-04-14 PROCEDURE — G8428 CUR MEDS NOT DOCUMENT: HCPCS | Performed by: INTERNAL MEDICINE

## 2025-04-14 RX ORDER — ROSUVASTATIN CALCIUM 10 MG/1
10 TABLET, COATED ORAL NIGHTLY
Qty: 30 TABLET | Refills: 3 | Status: SHIPPED | OUTPATIENT
Start: 2025-04-14

## 2025-04-14 ASSESSMENT — PATIENT HEALTH QUESTIONNAIRE - PHQ9
SUM OF ALL RESPONSES TO PHQ QUESTIONS 1-9: 2
1. LITTLE INTEREST OR PLEASURE IN DOING THINGS: SEVERAL DAYS
2. FEELING DOWN, DEPRESSED OR HOPELESS: SEVERAL DAYS

## 2025-04-14 NOTE — PROGRESS NOTES
Alesha Miller (:  1960) is a 64 y.o. female,Established patient, here for evaluation of the following chief complaint(s):  No chief complaint on file.         Assessment & Plan  HIV infection, unspecified symptom status (HCC)   Chronic, at goal (stable), continue current treatment plan and medication adherence emphasized  Continue Genvoya  Hep B surface antibody.  Vaccinate if titer is less than 100       Hyperlipidemia, mixed   Chronic, not at goal (unstable), continue current treatment plan, medication adherence emphasized, and lifestyle modifications recommended  Crestor 5 mg daily  Low-fat diet  Routine aerobic activity       Vitamin D deficiency, unspecified   Chronic, at goal (stable), continue current treatment plan       Essential hypertension  Positive Cologuard.   Emphasized the need for routine colonoscopy  Continue losartan  Major depression and anxiety on clonazepam         Subjective   HPI  Follow-up HIV, on Genvoya that she forgets to take twice weekly.   History of herpes exposure 2 years ago and wants to be tested.  No current symptoms or ulcerations.   Goes to the [facility] health, currently with a  [facility].   On Klonopin for anxiety.  No cigarette smoking no alcohol or drugs  1 sexual partner  Had OB/GYN and mammogram recently.   Left inner cheek ulcers related to biting her cheek.   Patient is very reluctant to get started on cholesterol medication.  She is very anxious about positive Cologuard with history of colonoscopy 1 year ago that was negative except for diverticulosis.   Patient has chronic hearing loss.  Able to communicate even without using hearing aids.   Reports to be continuously active.     Review of Systems   Rest of review of system is negative    Objective   Physical Exam     Vitals:    25 1404   BP: 136/74   BP Site: Right Upper Arm   Patient Position: Sitting   BP Cuff Size: Medium Adult   Pulse: 65   Resp: 16   Temp: 98.3 °F (36.8

## 2025-04-14 NOTE — ASSESSMENT & PLAN NOTE
Chronic, not at goal (unstable), continue current treatment plan, medication adherence emphasized, and lifestyle modifications recommended  Crestor 5 mg daily  Low-fat diet  Routine aerobic activity

## 2025-04-14 NOTE — PROGRESS NOTES
Routine apt today.     Main compliant today - concerns for herpes exposure 2 years ago.     Out pt medication list reviewed   Reviewed lab work   [place] [facility] here with her today.         Annual Nurse:    No drinking, smoking or drug use    ART currently:  Genvoya  Compliant with medications ---   All supplentments dicussed and education provided regarding need to keep supplements sepatre from ART.    -- pt verbalized all understanding.      Barriers to HIV VL suppression and compliance - forgets  Viral Load -- not detected   Medication concerns- forgets  How many misses in the past 30 days - 2 x weekly  100% compliance stressed    Partner  Current partner -- yes  HIV neg  Concerns-  denies       U=U   Pt is aware of U=U and verbalized understanding.     Dental--education provided on importance of dental cleaning and encouraged follow up --   Last week  [facility]       Vision -- Encouraged --     Mental Health --   Depression screening completed today:   [facility] routinely     Housing:   home    Needs housing assistance---Not at this time    Transport:  car    Reviewed other options for transportation assistance.  ---Not at this time      Medical Insurance:  Payor: [employer] MEDICARE / Plan: [employer] GOLD PLUS HMO / Product Type: *No Product type* /     Insurance issues/concerns ---Not at this time    Pharmacy:      [facility] [address] [place] [address] - [address] - P [phone] - F [phone]  [address] [facility] [address]  Phone: [phone] Fax: [phone]    [id number] - [place], [place] - [address] -  [phone] - F [phone]  [address] [place] [address]  Phone: [phone] Fax: [phone]        PCP--  Established with:    Discussed importance of having PCP.        Support system:     [] Support group        [] Spouse       [x] Family         [] Friends       [] Clubs  [] Spiritual practices   [] Catholic        [] Clergy         [] Councelor   [] Therapist

## 2025-04-14 NOTE — ASSESSMENT & PLAN NOTE
Chronic, at goal (stable), continue current treatment plan and medication adherence emphasized  Continue Genvoya  Hep B surface antibody.  Vaccinate if titer is less than 100

## 2025-05-08 ENCOUNTER — OFFICE VISIT (OUTPATIENT)
Dept: GASTROENTEROLOGY | Age: 65
End: 2025-05-08
Payer: MEDICARE

## 2025-05-08 ENCOUNTER — TELEPHONE (OUTPATIENT)
Dept: GASTROENTEROLOGY | Age: 65
End: 2025-05-08

## 2025-05-08 VITALS
HEART RATE: 55 BPM | BODY MASS INDEX: 22.67 KG/M2 | DIASTOLIC BLOOD PRESSURE: 70 MMHG | WEIGHT: 120 LBS | OXYGEN SATURATION: 98 % | SYSTOLIC BLOOD PRESSURE: 112 MMHG

## 2025-05-08 DIAGNOSIS — R19.5 POSITIVE COLORECTAL CANCER SCREENING USING COLOGUARD TEST: Primary | ICD-10-CM

## 2025-05-08 PROCEDURE — G8427 DOCREV CUR MEDS BY ELIG CLIN: HCPCS | Performed by: INTERNAL MEDICINE

## 2025-05-08 PROCEDURE — 3078F DIAST BP <80 MM HG: CPT | Performed by: INTERNAL MEDICINE

## 2025-05-08 PROCEDURE — G8420 CALC BMI NORM PARAMETERS: HCPCS | Performed by: INTERNAL MEDICINE

## 2025-05-08 PROCEDURE — 99214 OFFICE O/P EST MOD 30 MIN: CPT | Performed by: INTERNAL MEDICINE

## 2025-05-08 PROCEDURE — 3017F COLORECTAL CA SCREEN DOC REV: CPT | Performed by: INTERNAL MEDICINE

## 2025-05-08 PROCEDURE — 1036F TOBACCO NON-USER: CPT | Performed by: INTERNAL MEDICINE

## 2025-05-08 PROCEDURE — 3074F SYST BP LT 130 MM HG: CPT | Performed by: INTERNAL MEDICINE

## 2025-05-08 RX ORDER — SODIUM, POTASSIUM,MAG SULFATES 17.5-3.13G
SOLUTION, RECONSTITUTED, ORAL ORAL
Qty: 1 EACH | Refills: 0 | Status: SHIPPED | OUTPATIENT
Start: 2025-05-08

## 2025-05-08 NOTE — TELEPHONE ENCOUNTER
The patient will call back to schedule the colonoscopy with Renetta Jane sample was given to the patient. Directions will need mailed out.

## 2025-05-09 ENCOUNTER — PREP FOR PROCEDURE (OUTPATIENT)
Dept: GASTROENTEROLOGY | Age: 65
End: 2025-05-09

## 2025-05-09 DIAGNOSIS — R19.5 POSITIVE COLORECTAL CANCER SCREENING USING COLOGUARD TEST: ICD-10-CM

## 2025-05-09 RX ORDER — SODIUM PICOSULFATE, MAGNESIUM OXIDE, AND ANHYDROUS CITRIC ACID 10; 3.5; 12 MG/160ML; G/160ML; G/160ML
LIQUID ORAL
Qty: 320 ML | Refills: 0 | COMMUNITY
Start: 2025-05-09

## 2025-05-09 ASSESSMENT — ENCOUNTER SYMPTOMS
ABDOMINAL PAIN: 0
RECTAL PAIN: 0
TROUBLE SWALLOWING: 0
EYE REDNESS: 0
EYE PAIN: 0
WHEEZING: 0
CONSTIPATION: 1
COLOR CHANGE: 0
DIARRHEA: 1
ABDOMINAL DISTENTION: 0
PHOTOPHOBIA: 0
CHEST TIGHTNESS: 0
BLOOD IN STOOL: 0
SHORTNESS OF BREATH: 0
VOICE CHANGE: 0
NAUSEA: 0
VOMITING: 0

## 2025-05-09 NOTE — PROGRESS NOTES
summarized below:  Lab Results   Component Value Date/Time    WBC 4.2 04/08/2025 10:35 AM    WBC 5.5 10/18/2024 01:18 PM    WBC 9.7 09/21/2024 12:43 PM    WBC 5.7 05/07/2024 01:18 PM    WBC 8.6 01/25/2024 11:09 AM    HGB 14.6 04/08/2025 10:35 AM    HGB 14.0 10/18/2024 01:18 PM    HGB 15.2 09/21/2024 12:43 PM    HGB 15.1 05/07/2024 01:18 PM    HGB 14.6 01/25/2024 11:09 AM    HCT 44.0 04/08/2025 10:35 AM    HCT 43.3 10/18/2024 01:18 PM    HCT 45.3 09/21/2024 12:43 PM    HCT 45.7 05/07/2024 01:18 PM    HCT 45.0 01/25/2024 11:09 AM    MCV 97.1 04/08/2025 10:35 AM    MCV 95.8 10/18/2024 01:18 PM    MCV 95.4 09/21/2024 12:43 PM    MCV 95.6 05/07/2024 01:18 PM    MCV 96.6 01/25/2024 11:09 AM     04/08/2025 10:35 AM     10/18/2024 01:18 PM     09/21/2024 12:43 PM     05/07/2024 01:18 PM     01/25/2024 11:09 AM    .  Lab Results   Component Value Date/Time    ALT 23 04/08/2025 10:35 AM    ALT 10 10/18/2024 01:18 PM    ALT 10 09/21/2024 12:43 PM    AST 17 04/08/2025 10:35 AM    AST 14 10/18/2024 01:18 PM    AST 15 09/21/2024 12:43 PM    ALKPHOS 63 04/08/2025 10:35 AM    ALKPHOS 63 10/18/2024 01:18 PM    ALKPHOS 75 09/21/2024 12:43 PM    BILITOT 0.4 04/08/2025 10:35 AM    BILITOT 0.4 10/18/2024 01:18 PM    BILITOT 0.8 09/21/2024 12:43 PM       No results found.  Lab Results   Component Value Date/Time    IRON 97 08/29/2023 02:35 PM    TIBC 393 08/29/2023 02:35 PM     Lab Results   Component Value Date/Time    INR 1.0 06/07/2013 03:03 PM     No components found for: \"ACUTEHEPATITISSCREEN\"  No components found for: \"CELIACPANEL\"  No components found for: \"STOOLCULTURE\", \"C.DIFF\", \"STOOLOVAPARASITE\", \"STOOLLEUCOCYTE\"        Assessment:       Diagnosis Orders   1. Positive colorectal cancer screening using Cologuard test  Endoscopy, colon, diagnostic            Plan:      Orders Placed This Encounter   Procedures    Endoscopy, colon, diagnostic     Standing Status:   Future     Expected Date:

## 2025-05-10 RX ORDER — SODIUM CHLORIDE 0.9 % (FLUSH) 0.9 %
5-40 SYRINGE (ML) INJECTION PRN
Status: CANCELLED | OUTPATIENT
Start: 2025-05-10

## 2025-05-10 RX ORDER — SODIUM CHLORIDE 9 MG/ML
INJECTION, SOLUTION INTRAVENOUS PRN
Status: CANCELLED | OUTPATIENT
Start: 2025-05-10

## 2025-05-10 RX ORDER — SODIUM CHLORIDE 9 MG/ML
INJECTION, SOLUTION INTRAVENOUS CONTINUOUS
Status: CANCELLED | OUTPATIENT
Start: 2025-05-10

## 2025-05-10 RX ORDER — SODIUM CHLORIDE 0.9 % (FLUSH) 0.9 %
5-40 SYRINGE (ML) INJECTION EVERY 12 HOURS SCHEDULED
Status: CANCELLED | OUTPATIENT
Start: 2025-05-10

## 2025-05-14 ENCOUNTER — ANESTHESIA (OUTPATIENT)
Dept: ENDOSCOPY | Age: 65
End: 2025-05-14
Payer: MEDICARE

## 2025-05-14 ENCOUNTER — HOSPITAL ENCOUNTER (OUTPATIENT)
Age: 65
Setting detail: OUTPATIENT SURGERY
Discharge: HOME | End: 2025-05-14
Attending: INTERNAL MEDICINE | Admitting: INTERNAL MEDICINE
Payer: MEDICARE

## 2025-05-14 ENCOUNTER — ANESTHESIA EVENT (OUTPATIENT)
Dept: ENDOSCOPY | Age: 65
End: 2025-05-14
Payer: MEDICARE

## 2025-05-14 VITALS
HEIGHT: 61 IN | HEART RATE: 52 BPM | RESPIRATION RATE: 16 BRPM | WEIGHT: 120 LBS | DIASTOLIC BLOOD PRESSURE: 63 MMHG | BODY MASS INDEX: 22.66 KG/M2 | SYSTOLIC BLOOD PRESSURE: 131 MMHG | TEMPERATURE: 97.4 F | OXYGEN SATURATION: 96 %

## 2025-05-14 PROCEDURE — 2709999900 HC NON-CHARGEABLE SUPPLY: Performed by: INTERNAL MEDICINE

## 2025-05-14 PROCEDURE — 2580000003 HC RX 258: Performed by: INTERNAL MEDICINE

## 2025-05-14 PROCEDURE — 2500000003 HC RX 250 WO HCPCS: Performed by: INTERNAL MEDICINE

## 2025-05-14 PROCEDURE — 3700000000 HC ANESTHESIA ATTENDED CARE: Performed by: INTERNAL MEDICINE

## 2025-05-14 PROCEDURE — 3700000001 HC ADD 15 MINUTES (ANESTHESIA): Performed by: INTERNAL MEDICINE

## 2025-05-14 PROCEDURE — 6360000002 HC RX W HCPCS

## 2025-05-14 PROCEDURE — 3609027000 HC COLONOSCOPY: Performed by: INTERNAL MEDICINE

## 2025-05-14 PROCEDURE — 45378 DIAGNOSTIC COLONOSCOPY: CPT | Performed by: INTERNAL MEDICINE

## 2025-05-14 PROCEDURE — 7100000010 HC PHASE II RECOVERY - FIRST 15 MIN: Performed by: INTERNAL MEDICINE

## 2025-05-14 RX ORDER — MIDAZOLAM HYDROCHLORIDE 1 MG/ML
INJECTION, SOLUTION INTRAMUSCULAR; INTRAVENOUS
Status: COMPLETED
Start: 2025-05-14 | End: 2025-05-14

## 2025-05-14 RX ORDER — SODIUM CHLORIDE 9 MG/ML
INJECTION, SOLUTION INTRAVENOUS CONTINUOUS
Status: DISCONTINUED | OUTPATIENT
Start: 2025-05-14 | End: 2025-05-14 | Stop reason: HOSPADM

## 2025-05-14 RX ORDER — SODIUM CHLORIDE 9 MG/ML
INJECTION, SOLUTION INTRAVENOUS PRN
Status: DISCONTINUED | OUTPATIENT
Start: 2025-05-14 | End: 2025-05-14 | Stop reason: HOSPADM

## 2025-05-14 RX ORDER — SODIUM CHLORIDE 0.9 % (FLUSH) 0.9 %
5-40 SYRINGE (ML) INJECTION PRN
Status: DISCONTINUED | OUTPATIENT
Start: 2025-05-14 | End: 2025-05-14 | Stop reason: HOSPADM

## 2025-05-14 RX ORDER — MIDAZOLAM HYDROCHLORIDE 1 MG/ML
INJECTION, SOLUTION INTRAMUSCULAR; INTRAVENOUS
Status: DISCONTINUED | OUTPATIENT
Start: 2025-05-14 | End: 2025-05-14 | Stop reason: SDUPTHER

## 2025-05-14 RX ORDER — PROPOFOL 10 MG/ML
INJECTION, EMULSION INTRAVENOUS
Status: DISCONTINUED | OUTPATIENT
Start: 2025-05-14 | End: 2025-05-14 | Stop reason: SDUPTHER

## 2025-05-14 RX ORDER — LIDOCAINE HYDROCHLORIDE 20 MG/ML
INJECTION, SOLUTION INFILTRATION; PERINEURAL
Status: DISCONTINUED | OUTPATIENT
Start: 2025-05-14 | End: 2025-05-14 | Stop reason: SDUPTHER

## 2025-05-14 RX ORDER — SODIUM CHLORIDE 0.9 % (FLUSH) 0.9 %
5-40 SYRINGE (ML) INJECTION EVERY 12 HOURS SCHEDULED
Status: DISCONTINUED | OUTPATIENT
Start: 2025-05-14 | End: 2025-05-14 | Stop reason: HOSPADM

## 2025-05-14 RX ADMIN — SODIUM CHLORIDE: 9 INJECTION, SOLUTION INTRAVENOUS at 09:31

## 2025-05-14 RX ADMIN — MIDAZOLAM HYDROCHLORIDE 2 MG: 1 INJECTION, SOLUTION INTRAMUSCULAR; INTRAVENOUS at 10:17

## 2025-05-14 RX ADMIN — PROPOFOL 50 MG: 10 INJECTION, EMULSION INTRAVENOUS at 10:26

## 2025-05-14 RX ADMIN — PROPOFOL 100 MG: 10 INJECTION, EMULSION INTRAVENOUS at 10:21

## 2025-05-14 RX ADMIN — PROPOFOL 50 MG: 10 INJECTION, EMULSION INTRAVENOUS at 10:30

## 2025-05-14 RX ADMIN — LIDOCAINE HYDROCHLORIDE 3 ML: 20 INJECTION, SOLUTION INFILTRATION; PERINEURAL at 10:21

## 2025-05-14 ASSESSMENT — PAIN - FUNCTIONAL ASSESSMENT
PAIN_FUNCTIONAL_ASSESSMENT: NONE - DENIES PAIN
PAIN_FUNCTIONAL_ASSESSMENT: 0-10
PAIN_FUNCTIONAL_ASSESSMENT: 0-10

## 2025-05-14 NOTE — ANESTHESIA PRE PROCEDURE
Department of Anesthesiology  Preprocedure Note       Name:  Alesha Miller   Age:  64 y.o.  :  1960                                          MRN:  [id number]         Date:  2025      Surgeon: Surgeon(s):  Thelma Palacios MD    Procedure: Procedure(s):  COLONOSCOPY DIAGNOSTIC    Medications prior to admission:   Prior to Admission medications    Medication Sig Start Date End Date Taking? Authorizing Provider   Sod Picosulfate-Mag Ox-Cit Acd (CLENPIQ) 10-3.5-12 MG-GM -GM/160ML SOLN solution Take as directed 25   Thelma Palacios MD   sodium-potassium-mag sulfate (SUPREP) 17.5-3.13-1.6 GM/177ML SOLN solution As directed 25   Thelma Palacios MD   rosuvastatin (CRESTOR) 10 MG tablet Take 1 tablet by mouth nightly 25   Alesha Schulz MD   elvitegravir-cobicistat-emtricitabine-tenofovir alafenamide (GENVOYA) 110-432-362-10 MG TABLET TAKE ONE TABLET BY MOUTH EVERY DAY 24   Alesha Schulz MD   Lubricants (ASTROGLIDE) GEL Apply 1 Application topically as needed (during intercourse) 24   Miles Villeda MD   Omega-3 Fatty Acids (FISH OIL) 1000 MG capsule Take 1 capsule by mouth daily Per pt takes weekly    Shad Alvarez MD   omega-3 acid ethyl esters (LOVAZA) 1 g capsule Take 2 capsules by mouth daily  Patient not taking: Reported on 2024 10/22/24 11/21/24  Alesha Schulz MD   cetirizine (ZYRTEC) 10 MG tablet Take 1 tablet by mouth daily 5/15/24   Mckenzie Hernández NP-C   valsartan (DIOVAN) 40 MG tablet Take 1 tablet by mouth daily Half a tablet 3 times weekly. Per PCP 7/10/23   Shad Alvarez MD   ergocalciferol (ERGOCALCIFEROL) 1.25 MG (98787 UT) capsule Take 1 capsule by mouth once a week 21   Shad Alvarez MD   ibuprofen (ADVIL;MOTRIN) 600 MG tablet Take 1 tablet by mouth  Patient not taking: Reported on 2025    Provider, Shad, MD   cyanocobalamin 1000 MCG tablet Take 1 tablet by mouth 4/23/15   Provider, MD Shad   clonazepam

## 2025-05-14 NOTE — H&P
Patient Name: Alesha Miller  : 1960  MRN: [id number]  DATE: 25      ENDOSCOPY  History and Physical    Procedure:    [x] Diagnostic Colonoscopy       [] Screening Colonoscopy  [] EGD      [] ERCP      [] EUS       [] Other    [x] Previous office notes/History and Physical reviewed from the patients chart. Please see EMR for further details of HPI. I have examined the patient's status immediately prior to the procedure and:      Indications/HPI:    []Abdominal Pain   []Cancer- GI/Lung  []Fhx of colon CA/polyps  []History of Polyps   []Wahl’s   []Melena  []Abnormal Imaging   []Dysphagia    []Persistent Pneumonia  []Anemia   []Food Impaction  []History of Polyps  []GI Bleed   []Pulmonary nodule/Mass  []Change in bowel habits  []Heartburn/Reflux  []Rectal Bleed (BRBPR)  []Chest Pain - Non Cardiac  [x]Heme (+) Stool  []Ulcers  []Constipation   []Hemoptysis   []Varices  []Diarrhea   []Hypoxemia  []Nausea/Vomiting   []Screening   []Crohns/Colitis  []Other:    Anesthesia:   [x] MAC [] Moderate Sedation   [] General   [] None     ROS: 12 pt Review of Symptoms was negative unless mentioned above    Medications:   Prior to Admission medications    Medication Sig Start Date End Date Taking? Authorizing Provider   Sod Picosulfate-Mag Ox-Cit Acd (CLENPIQ) 10-3.5-12 MG-GM -GM/160ML SOLN solution Take as directed 25   Thelma Palacios MD   sodium-potassium-mag sulfate (SUPREP) 17.5-3.13-1.6 GM/177ML SOLN solution As directed 25   Thelma Palacios MD   rosuvastatin (CRESTOR) 10 MG tablet Take 1 tablet by mouth nightly 25   Alesha Schulz MD   elvitegravir-cobicistat-emtricitabine-tenofovir alafenamide (GENVOYA) 650-051-955-10 MG TABLET TAKE ONE TABLET BY MOUTH EVERY DAY 24   Alesha Schulz MD   Lubricants (ASTROGLIDE) GEL Apply 1 Application topically as needed (during intercourse) 24   Miles Villeda MD   Omega-3 Fatty Acids (FISH OIL) 1000 MG capsule Take 1 capsule by mouth daily Per pt

## 2025-05-14 NOTE — ANESTHESIA POSTPROCEDURE EVALUATION
Department of Anesthesiology  Postprocedure Note    Patient: Alesha Miller  MRN: [id number]  YOB: 1960  Date of evaluation: 5/14/2025    Procedure Summary       Date: 05/14/25 Room / Location: [facility] OR 02 / [facility]    Anesthesia Start: 1017 Anesthesia Stop: 1040    Procedure: COLONOSCOPY DIAGNOSTIC Diagnosis:       Positive colorectal cancer screening using Cologuard test      (Positive colorectal cancer screening using Cologuard test [R19.5])    Surgeons: Thelma Palacios MD Responsible Provider: Elva Casanova APRN - CRNA    Anesthesia Type: MAC ASA Status: 3            Anesthesia Type: No value filed.    Ida Phase I: Ida Score: 10    Ida Phase II:      Anesthesia Post Evaluation    Patient location during evaluation: bedside  Patient participation: complete - patient participated  Level of consciousness: awake and awake and alert  Airway patency: patent  Nausea & Vomiting: no nausea and no vomiting  Cardiovascular status: blood pressure returned to baseline and hemodynamically stable  Respiratory status: acceptable  Hydration status: euvolemic  Pain management: adequate        No notable events documented.

## 2025-05-16 ENCOUNTER — TELEPHONE (OUTPATIENT)
Dept: GASTROENTEROLOGY | Age: 65
End: 2025-05-16

## 2025-06-03 RX ORDER — ELVITEGRAVIR, COBICISTAT, EMTRICITABINE, AND TENOFOVIR ALAFENAMIDE 150; 150; 200; 10 MG/1; MG/1; MG/1; MG/1
1 TABLET ORAL DAILY
Qty: 30 TABLET | Refills: 5 | Status: ACTIVE | OUTPATIENT
Start: 2025-06-03

## 2025-06-17 ENCOUNTER — OFFICE VISIT (OUTPATIENT)
Dept: GASTROENTEROLOGY | Age: 65
End: 2025-06-17
Payer: MEDICARE

## 2025-06-17 VITALS
OXYGEN SATURATION: 97 % | BODY MASS INDEX: 22.3 KG/M2 | WEIGHT: 118 LBS | SYSTOLIC BLOOD PRESSURE: 100 MMHG | HEART RATE: 67 BPM | DIASTOLIC BLOOD PRESSURE: 64 MMHG

## 2025-06-17 DIAGNOSIS — K57.90 DIVERTICULOSIS: Primary | ICD-10-CM

## 2025-06-17 PROCEDURE — 3074F SYST BP LT 130 MM HG: CPT | Performed by: NURSE PRACTITIONER

## 2025-06-17 PROCEDURE — G8427 DOCREV CUR MEDS BY ELIG CLIN: HCPCS | Performed by: NURSE PRACTITIONER

## 2025-06-17 PROCEDURE — 3017F COLORECTAL CA SCREEN DOC REV: CPT | Performed by: NURSE PRACTITIONER

## 2025-06-17 PROCEDURE — 99213 OFFICE O/P EST LOW 20 MIN: CPT | Performed by: NURSE PRACTITIONER

## 2025-06-17 PROCEDURE — 3078F DIAST BP <80 MM HG: CPT | Performed by: NURSE PRACTITIONER

## 2025-06-17 PROCEDURE — G8420 CALC BMI NORM PARAMETERS: HCPCS | Performed by: NURSE PRACTITIONER

## 2025-06-17 PROCEDURE — 1036F TOBACCO NON-USER: CPT | Performed by: NURSE PRACTITIONER

## 2025-06-17 RX ORDER — FAMOTIDINE 20 MG/1
TABLET, FILM COATED ORAL
COMMUNITY
Start: 2025-06-13

## 2025-06-17 ASSESSMENT — ENCOUNTER SYMPTOMS
ABDOMINAL DISTENTION: 0
CONSTIPATION: 0
PHOTOPHOBIA: 0
TROUBLE SWALLOWING: 0
RECTAL PAIN: 0
COLOR CHANGE: 0
EYE PAIN: 0
EYE REDNESS: 0
SHORTNESS OF BREATH: 0
CHEST TIGHTNESS: 0
VOICE CHANGE: 0
VOMITING: 0
ABDOMINAL PAIN: 0
WHEEZING: 0
NAUSEA: 0
DIARRHEA: 0
BLOOD IN STOOL: 0
ANAL BLEEDING: 0

## 2025-06-17 NOTE — PROGRESS NOTES
Spouse name: Not on file    Number of children: Not on file    Years of education: Not on file    Highest education level: Not on file   Occupational History    Not on file   Tobacco Use    Smoking status: Never     Passive exposure: Never    Smokeless tobacco: Never   Vaping Use    Vaping status: Never Used   Substance and Sexual Activity    Alcohol use: Not Currently    Drug use: No    Sexual activity: Not Currently   Other Topics Concern    Not on file   Social History Narrative    Not on file     Social Drivers of Health     Financial Resource Strain: Not on file   Food Insecurity: Food Insecurity Present (1/14/2025)    Hunger Vital Sign     Worried About Running Out of Food in the Last Year: Sometimes true     Ran Out of Food in the Last Year: Never true   Transportation Needs: No Transportation Needs (1/14/2025)    PRAPARE - Transportation     Lack of Transportation (Medical): No     Lack of Transportation (Non-Medical): No   Physical Activity: Insufficiently Active (4/15/2025)    Received from [facility]    Exercise Vital Sign     Days of Exercise per Week: 1 day     Minutes of Exercise per Session: 10 min   Stress: Not on file   Social Connections: Not on file   Intimate Partner Violence: Not on file   Housing Stability: Low Risk  (1/14/2025)    Housing Stability Vital Sign     Unable to Pay for Housing in the Last Year: No     Number of Times Moved in the Last Year: 0     Homeless in the Last Year: No     Family History   Problem Relation Age of Onset    Cancer Mother         breast    Breast Cancer Mother     Hypertension Father     Liver Disease Father     Stroke Sister     Heart Disease Brother 50    Diabetes Brother     Cancer Maternal Aunt         breast    Cancer Maternal Aunt         skin    Breast Cancer Maternal Aunt     Colon Cancer Neg Hx      Allergies   Allergen Reactions    Amoxicillin Hives    Clobetasol Other (See Comments)     Dry mouth.     Doxycycline Monohydrate

## 2025-07-01 ENCOUNTER — TELEPHONE (OUTPATIENT)
Dept: GASTROENTEROLOGY | Age: 65
End: 2025-07-01

## 2025-07-01 DIAGNOSIS — R63.4 WEIGHT LOSS, UNINTENTIONAL: Primary | ICD-10-CM

## 2025-07-01 NOTE — TELEPHONE ENCOUNTER
Patient called to asked if a order was placed for a F/U colon, she said she had spoke with you about in case her symptoms got any worse

## 2025-08-05 ENCOUNTER — TELEPHONE (OUTPATIENT)
Age: 65
End: 2025-08-05

## 2025-08-07 RX ORDER — ROSUVASTATIN CALCIUM 10 MG/1
10 TABLET, COATED ORAL NIGHTLY
Qty: 30 TABLET | Refills: 3 | Status: SHIPPED | OUTPATIENT
Start: 2025-08-07

## 2025-08-21 ENCOUNTER — TELEPHONE (OUTPATIENT)
Age: 65
End: 2025-08-21

## 2025-08-26 ENCOUNTER — CLINICAL SUPPORT (OUTPATIENT)
Age: 65
End: 2025-08-26

## 2025-08-26 VITALS — BODY MASS INDEX: 22.56 KG/M2 | WEIGHT: 119.4 LBS

## 2025-08-26 DIAGNOSIS — E78.2 HYPERLIPIDEMIA, MIXED: ICD-10-CM

## 2025-08-26 DIAGNOSIS — Z21 HIV INFECTION, UNSPECIFIED SYMPTOM STATUS (HCC): Primary | ICD-10-CM

## 2025-08-26 DIAGNOSIS — Z21 HIV INFECTION, UNSPECIFIED SYMPTOM STATUS (HCC): ICD-10-CM

## 2025-08-26 LAB
ALBUMIN SERPL-MCNC: 4.5 G/DL (ref 3.5–4.6)
ALP SERPL-CCNC: 65 U/L (ref 40–130)
ALT SERPL-CCNC: 26 U/L (ref 0–33)
ANION GAP SERPL CALCULATED.3IONS-SCNC: 11 MEQ/L (ref 9–15)
AST SERPL-CCNC: 19 U/L (ref 0–35)
BILIRUB SERPL-MCNC: 0.5 MG/DL (ref 0.2–0.7)
BUN SERPL-MCNC: 15 MG/DL (ref 8–23)
CALCIUM SERPL-MCNC: 8.8 MG/DL (ref 8.5–9.9)
CHLORIDE SERPL-SCNC: 101 MEQ/L (ref 95–107)
CHOLEST SERPL-MCNC: 172 MG/DL (ref 0–199)
CO2 SERPL-SCNC: 29 MEQ/L (ref 20–31)
CREAT SERPL-MCNC: 0.84 MG/DL (ref 0.5–0.9)
ERYTHROCYTE [DISTWIDTH] IN BLOOD BY AUTOMATED COUNT: 13.1 % (ref 11.5–14.5)
GLOBULIN SER CALC-MCNC: 2.4 G/DL (ref 2.3–3.5)
GLUCOSE SERPL-MCNC: 107 MG/DL (ref 70–99)
HCT VFR BLD AUTO: 45.4 % (ref 37–47)
HDLC SERPL-MCNC: 61 MG/DL (ref 40–59)
HGB BLD-MCNC: 15.3 G/DL (ref 12–16)
LDLC SERPL CALC-MCNC: 82 MG/DL (ref 0–129)
MCH RBC QN AUTO: 33 PG (ref 27–31.3)
MCHC RBC AUTO-ENTMCNC: 33.7 % (ref 33–37)
MCV RBC AUTO: 97.8 FL (ref 79.4–94.8)
PLATELET # BLD AUTO: 208 K/UL (ref 130–400)
POTASSIUM SERPL-SCNC: 4.1 MEQ/L (ref 3.4–4.9)
PROT SERPL-MCNC: 6.9 G/DL (ref 6.3–8)
RBC # BLD AUTO: 4.64 M/UL (ref 4.2–5.4)
SODIUM SERPL-SCNC: 141 MEQ/L (ref 135–144)
TRIGL SERPL-MCNC: 146 MG/DL (ref 0–150)
WBC # BLD AUTO: 5.1 K/UL (ref 4.8–10.8)

## 2025-08-27 LAB
CD3+CD4+ CELLS # BLD: 849 CELLS/UL (ref 430–1800)
CD4 % HELPER T CELL: 63 % (ref 32–64)
IMMUNODEFICIENCY MARKERS SPEC-IMP: NORMAL

## 2025-08-29 LAB
HIV QUANT LOG: 1.41 LOG CPY/ML
HIV-1 RNA BY PCR, QN: 25.7 CPY/ML
HIV-1 RNA BY PCR, QN: DETECTED
SOURCE: ABNORMAL

## (undated) DEVICE — BRUSH ENDO CLN L90.5IN SHTH DIA1.7MM BRIST DIA5-7MM 2-6MM

## (undated) DEVICE — TUBING, SUCTION, 1/4" X 10', STRAIGHT: Brand: MEDLINE

## (undated) DEVICE — Device: Brand: ENDO SMARTCAP

## (undated) DEVICE — TUBE SET 96 MM 64 MM H2O PERISTALTIC STD AUX CHANNEL

## (undated) DEVICE — SINGLE PORT MANIFOLD: Brand: NEPTUNE 2

## (undated) DEVICE — GLOVE ORTHO 8   MSG9480

## (undated) DEVICE — GLOVE ORANGE PI 8 1/2   MSG9085

## (undated) DEVICE — TUBING IRRIGATION 140/160/180/190 SER GI ENDOSCP SMARTCAP

## (undated) DEVICE — ENDO CARRY-ON PROCEDURE KIT: Brand: ENDO CARRY-ON PROCEDURE KIT